# Patient Record
Sex: MALE | Race: BLACK OR AFRICAN AMERICAN | Employment: UNEMPLOYED | URBAN - METROPOLITAN AREA
[De-identification: names, ages, dates, MRNs, and addresses within clinical notes are randomized per-mention and may not be internally consistent; named-entity substitution may affect disease eponyms.]

---

## 2018-03-05 ENCOUNTER — HOSPITAL ENCOUNTER (EMERGENCY)
Age: 58
Discharge: HOME | End: 2018-03-05
Attending: EMERGENCY MEDICINE | Admitting: EMERGENCY MEDICINE
Payer: SELF-PAY

## 2018-03-05 VITALS
SYSTOLIC BLOOD PRESSURE: 162 MMHG | OXYGEN SATURATION: 97 % | WEIGHT: 200 LBS | TEMPERATURE: 97.9 F | HEART RATE: 59 BPM | BODY MASS INDEX: 29.62 KG/M2 | HEIGHT: 69 IN | RESPIRATION RATE: 20 BRPM | DIASTOLIC BLOOD PRESSURE: 74 MMHG

## 2018-03-05 DIAGNOSIS — R21 RASH: Primary | ICD-10-CM

## 2018-03-05 DIAGNOSIS — R11.0 NAUSEA WITHOUT VOMITING: ICD-10-CM

## 2018-03-05 PROCEDURE — 74011250637 HC RX REV CODE- 250/637: Performed by: EMERGENCY MEDICINE

## 2018-03-05 PROCEDURE — 99283 EMERGENCY DEPT VISIT LOW MDM: CPT

## 2018-03-05 RX ORDER — FAMOTIDINE 20 MG/1
20 TABLET, FILM COATED ORAL
Qty: 7 TAB | Refills: 0 | Status: SHIPPED | OUTPATIENT
Start: 2018-03-05 | End: 2020-02-11

## 2018-03-05 RX ORDER — FAMOTIDINE 20 MG/1
20 TABLET, FILM COATED ORAL
Status: COMPLETED | OUTPATIENT
Start: 2018-03-05 | End: 2018-03-05

## 2018-03-05 RX ADMIN — FAMOTIDINE 20 MG: 20 TABLET, FILM COATED ORAL at 16:29

## 2018-03-05 NOTE — ED PROVIDER NOTES
EMERGENCY DEPARTMENT HISTORY AND PHYSICAL EXAM      Date: 3/5/2018  Patient Name: Sean Go    History of Presenting Illness     Chief Complaint   Patient presents with    Rash     x 1 month       History Provided By: Patient    HPI: Sean Go, 56 y.o. male with PMHx significant for Vertigo and Hypotension, presents ambulatory to the ED with cc of gradual onset worsening rash to his back for the last several months. Pt describes that the rash becomes pruritic when touched but denies pain. He notes that the rash started at the back of his neck. He denies taking any medication for the rash, including Benadryl; although advised to take the benadryl by [facility]. He notes he was recently admitting at [facility] after a syncopal episode and prescribed 81 mg Aspirin and \"another medication for his lungs\" and was told he had a heart attach and kidney failure but did not have any stents placed in his heart. However he states he stopped taking both of these medications because he felt sx's of nausea at that time. He reports additional sx's of daily nausea, dizziness and palpitations intermittently that have been ongoing for the last several months. He describes that his mouth feels dry and notes concern for dehydration. He denies any sx of chills, fevers, coughing, vomiting or diarrhea. SHx: former smoker     PCP: None    There are no other complaints, changes, or physical findings at this time. Past History     Past Medical History:  Past Medical History:   Diagnosis Date    Hypotension     Vertigo        Past Surgical History:  History reviewed. No pertinent surgical history. Family History:  History reviewed. No pertinent family history.     Social History:  Social History   Substance Use Topics    Smoking status: Former Smoker    Smokeless tobacco: None    Alcohol use Yes      Comment: occ       Allergies:  No Known Allergies      Review of Systems   Review of Systems   Constitutional: Negative for activity change, appetite change, chills, fever and unexpected weight change. HENT: Negative for congestion. Eyes: Negative for pain and visual disturbance. Respiratory: Negative for cough and shortness of breath. Cardiovascular: Positive for palpitations. Negative for chest pain. Gastrointestinal: Positive for nausea. Negative for diarrhea and vomiting. Genitourinary: Negative for dysuria. Skin: Positive for rash. Neurological: Positive for dizziness. Negative for headaches. All other systems reviewed and are negative. Physical Exam   Physical Exam   Constitutional: He is oriented to person, place, and time. He appears well-developed and well-nourished. HENT:   Head: Normocephalic and atraumatic. Mouth/Throat: Oropharynx is clear and moist. Mucous membranes are dry. Eyes: Conjunctivae and EOM are normal. Pupils are equal, round, and reactive to light. Right eye exhibits no discharge. Left eye exhibits no discharge. Neck: Normal range of motion. Neck supple. No JVD present. Cardiovascular: Normal rate, regular rhythm, normal heart sounds and intact distal pulses. No murmur heard. Pulmonary/Chest: Effort normal and breath sounds normal. No respiratory distress. He has no wheezes. He has no rales. Lungs clear to auscultation    Abdominal: Soft. Bowel sounds are normal. He exhibits no distension. There is no tenderness. Musculoskeletal: Normal range of motion. He exhibits no edema. Neurological: He is alert and oriented to person, place, and time. No cranial nerve deficit. He exhibits normal muscle tone. Coordination normal.   tremulous    Skin: Skin is warm and dry. Rash noted. He is not diaphoretic. Diffuse rash to posterior trunk with small circular skin discolorations. No herald patches. Psychiatric: He has a normal mood and affect. Nursing note and vitals reviewed.         Diagnostic Study Results       Medical Decision Making   I am the first provider for this patient. I reviewed the vital signs, available nursing notes, past medical history, past surgical history, family history and social history. Vital Signs-Reviewed the patient's vital signs. Patient Vitals for the past 12 hrs:   Temp Pulse Resp BP SpO2   03/05/18 1545 97.9 °F (36.6 °C) (!) 59 20 162/74 97 %       Records Reviewed: Nursing Notes and Old Medical Records    Provider Notes (Medical Decision Making):   Homeless, clean male mentioning symptoms likely related to dehydration. Patient admits to one glass of water daily and one soup kitchen meal a day. Otherwise, drinking caffeine and beer. Non compliant with unknown meds prescribed at [facility]. Exam normal with vitals notable for hypertension but without evidence of acute dehydration. Discussed \"sour stomach\" and recommend filling a prescription for pepcid one week. Discussed proper hydration and care for the rash. Return precautions discussed. ED Course:   Initial assessment performed. The patients presenting problems have been discussed, and they are in agreement with the care plan formulated and outlined with them. I have encouraged them to ask questions as they arise throughout their visit. Disposition:  Discharge Note:  4:38 PM  The patient is ready for discharge. The patient's signs, symptoms, diagnosis, and discharge instruction have been discussed and the patient has conveyed their understanding. The patient is to follow up as recommended or return to the ER should their symptoms worsen. Plan has been discussed and the patient is in agreement. Written by Claudia Najera ED Scribhelio, as dictated by Hakan Grissom MD.    PLAN:  1. Current Discharge Medication List      START taking these medications    Details   famotidine (PEPCID) 20 mg tablet Take 1 Tab by mouth nightly. Qty: 7 Tab, Refills: 0           2.    Follow-up Information     Follow up With Details Comments Contact Info    [facility] EMERGENCY DEPT  If symptoms worsen [address] [facility] [address]  [phone]        Return to ED if worse     Diagnosis     Clinical Impression:   1. Rash    2. Nausea without vomiting        Attestations: This is note is prepared by Claudia Najera, acting as Scribe for MD Hakan Sykes MD: The scribe's documentation has been prepared under my direction and personally reviewed by me in its entirety. I confirm that the note above accurately reflects all work, treatment, procedures, and medical decision making performed by me.

## 2018-03-05 NOTE — ED NOTES
Discharge instructions were given to the patient by MADONNA Avila RN. The patient left the Emergency Department ambulatory, alert and oriented and in no acute distress with 1 prescription(s). The patient was encouraged to call or return to the ED for worsening symptoms or problems and was encouraged to schedule a follow up appointment for continuing care. Patient leaving ED accompanied by self     The patient verbalized understanding of discharge instructions and prescriptions, all questions were answered. The patient has no further concerns at this time. Patient declined wheelchair transfer upon ED discharge.

## 2018-03-05 NOTE — DISCHARGE INSTRUCTIONS
Pityriasis Rosea: Care Instructions  Your Care Instructions    Pityriasis rosea (say \"pit-uh-RY-uh-moody MAGGIE-zee-uh\") is a common skin rash. It usually starts as one scaly, reddish-pink spot on your stomach or back. Days or weeks later, more spots appear. The rash may itch, but it will not spread to other people. No one knows what causes pityriasis rosea. Some doctors believe it is a reaction to a virus. Pityriasis rosea is most common in children and young adults. It lasts 1 to 3 months and then goes away on its own. Medicine can help relieve any itching. Follow-up care is a key part of your treatment and safety. Be sure to make and go to all appointments, and call your doctor if you are having problems. It's also a good idea to know your test results and keep a list of the medicines you take. How can you care for yourself at home? · Use your medicine exactly as prescribed. Call your doctor if you have any problems with your medicine. · Expose your skin to small amounts of sunlight, but avoid sunburn. Sunlight can lessen the rash. · Use a mild soap, such as Dove or Cetaphil, when you wash your skin. · Add a handful of oatmeal (ground to a powder) to your bath. Or you can try an oatmeal bath product, such as Aveeno. Keep the water warm or lukewarm. A hot bath or shower may make the rash more visible and itchy. · Use an over-the-counter 1% hydrocortisone cream for small itchy areas. When should you call for help? Call your doctor now or seek immediate medical care if:  ? · You have signs of infection such as:  ¨ Pain, warmth, or swelling near the rash. ¨ Red streaks near the rash. ¨ Pus coming from the rash. ¨ A fever. ? Watch closely for changes in your health, and be sure to contact your doctor if:  ? · You see the rash on the palms of your hands or the soles of your feet. ? · You do not get better as expected. Where can you learn more?   Go to http://nomi-vinayak.info/. Enter S327 in the search box to learn more about \"Pityriasis Rosea: Care Instructions. \"  Current as of: October 13, 2016  Content Version: 11.4  © 5350-9003 [employer]. Care instructions adapted under license by [employer] (which disclaims liability or warranty for this information). If you have questions about a medical condition or this instruction, always ask your healthcare professional. [address] any warranty or liability for your use of this information. Indigestion (Dyspepsia or Heartburn): Care Instructions  Your Care Instructions  Sometimes it can be hard to pinpoint the cause of indigestion. (It is also called dyspepsia or heartburn.) Most cases of an upset stomach with bloating, burning, burping, and nausea are minor and go away within several hours. Home treatment and over-the-counter medicine often are able to control symptoms. But if you take medicine to relieve your indigestion without making diet and lifestyle changes, your symptoms are likely to return again and again. If you get indigestion often, it may be a sign of a more serious medical problem. Be sure to follow up with your doctor, who may want to do tests to be sure of the cause of your indigestion. Follow-up care is a key part of your treatment and safety. Be sure to make and go to all appointments, and call your doctor if you are having problems. It's also a good idea to know your test results and keep a list of the medicines you take. How can you care for yourself at home? · Your doctor may recommend over-the-counter medicine. For mild or occasional indigestion, antacids such as Gaviscon, Mylanta, Maalox, or Tums, may help. Be safe with medicines. Be careful when you take over-the-counter antacid medicines. Many of these medicines have aspirin in them.  Read the label to make sure that you are not taking more than the recommended dose. Too much aspirin can be harmful. · Your doctor also may recommend over-the-counter acid reducers, such as Pepcid AC, Tagamet HB, Zantac 75, or Prilosec. Read and follow all instructions on the label. If you use these medicines often, talk with your doctor. · Change your eating habits. ¨ It's best to eat several small meals instead of two or three large meals. ¨ After you eat, wait 2 to 3 hours before you lie down. ¨ Chocolate, mint, and alcohol can make GERD worse. ¨ Spicy foods, foods that have a lot of acid (like tomatoes and oranges), and coffee can make GERD symptoms worse in some people. If your symptoms are worse after you eat a certain food, you may want to stop eating that food to see if your symptoms get better. · Do not smoke or chew tobacco. Smoking can make GERD worse. If you need help quitting, talk to your doctor about stop-smoking programs and medicines. These can increase your chances of quitting for good. · If you have GERD symptoms at night, raise the head of your bed 6 to 8 inches. You can do this by putting the frame on blocks or placing a foam wedge under the head of your mattress. (Adding extra pillows does not work.)  · Do not wear tight clothing around your middle. · Lose weight if you need to. Losing just 5 to 10 pounds can help. · Do not take anti-inflammatory medicines, such as aspirin, ibuprofen (Advil, Motrin), or naproxen (Aleve). These can irritate the stomach. If you need a pain medicine, try acetaminophen (Tylenol), which does not cause stomach upset. When should you call for help? Call your doctor now or seek immediate medical care if:  ? · You have new or worse belly pain. ? · You are vomiting. ? Watch closely for changes in your health, and be sure to contact your doctor if:  ? · You have new or worse symptoms of indigestion. ? · You have trouble or pain swallowing. ? · You are losing weight. ? · You do not get better as expected.    Where can you learn more?  Go to http://nomi-vinayak.info/. Enter I629 in the search box to learn more about \"Indigestion (Dyspepsia or Heartburn): Care Instructions. \"  Current as of: May 12, 2017  Content Version: 11.4  © 2363-6612 [employer], [employer]. Care instructions adapted under license by [employer] (which disclaims liability or warranty for this information). If you have questions about a medical condition or this instruction, always ask your healthcare professional. [address] any warranty or liability for your use of this information.

## 2018-03-05 NOTE — ED NOTES
Emergency Department Nursing Plan of Care       The Nursing Plan of Care is developed from the Nursing assessment and Emergency Department Attending provider initial evaluation. The plan of care may be reviewed in the ED Provider note. The Plan of Care was developed with the following considerations:   Patient / Family readiness to learn indicated by:verbalized understanding  Persons(s) to be included in education: patient  Barriers to Learning/Limitations:No    Signed     Hernandez Maguire    3/5/2018   4:09 PM    See nursing assessment    Patient is alert and oriented x 4 and in no acute distress at this time. Respirations are at a regular rate, depth, and pattern. Patient updated on plan of care and has no questions or concerns at this time. Patient is a poor historian. He isn't sure what he was admitted for back in Nov 2017, he isn't clear what is pmh is, and he isn't sure if he is supposed to be taking any medications.

## 2020-02-10 ENCOUNTER — APPOINTMENT (OUTPATIENT)
Dept: GENERAL RADIOLOGY | Age: 60
DRG: 139 | End: 2020-02-10
Attending: EMERGENCY MEDICINE
Payer: MEDICAID

## 2020-02-10 ENCOUNTER — HOSPITAL ENCOUNTER (INPATIENT)
Age: 60
LOS: 5 days | Discharge: HOME | DRG: 139 | End: 2020-02-15
Attending: EMERGENCY MEDICINE | Admitting: STUDENT IN AN ORGANIZED HEALTH CARE EDUCATION/TRAINING PROGRAM
Payer: MEDICAID

## 2020-02-10 DIAGNOSIS — I42.9 CARDIOMYOPATHY, UNSPECIFIED TYPE (HCC): ICD-10-CM

## 2020-02-10 DIAGNOSIS — J18.9 PNEUMONIA OF LEFT LOWER LOBE DUE TO INFECTIOUS ORGANISM: Primary | ICD-10-CM

## 2020-02-10 DIAGNOSIS — I47.1 SVT (SUPRAVENTRICULAR TACHYCARDIA) (HCC): ICD-10-CM

## 2020-02-10 LAB
ALBUMIN SERPL-MCNC: 3.2 G/DL (ref 3.5–5)
ALBUMIN/GLOB SERPL: 0.8 {RATIO} (ref 1.1–2.2)
ALP SERPL-CCNC: 117 U/L (ref 45–117)
ALT SERPL-CCNC: 34 U/L (ref 12–78)
ANION GAP SERPL CALC-SCNC: 7 MMOL/L (ref 5–15)
AST SERPL-CCNC: 32 U/L (ref 15–37)
BASOPHILS # BLD: 0 K/UL (ref 0–0.1)
BASOPHILS NFR BLD: 0 % (ref 0–1)
BILIRUB SERPL-MCNC: 0.4 MG/DL (ref 0.2–1)
BUN SERPL-MCNC: 25 MG/DL (ref 6–20)
BUN/CREAT SERPL: 9 (ref 12–20)
CALCIUM SERPL-MCNC: 9.5 MG/DL (ref 8.5–10.1)
CHLORIDE SERPL-SCNC: 107 MMOL/L (ref 97–108)
CO2 SERPL-SCNC: 25 MMOL/L (ref 21–32)
COMMENT, HOLDF: NORMAL
CREAT SERPL-MCNC: 2.91 MG/DL (ref 0.7–1.3)
DIFFERENTIAL METHOD BLD: ABNORMAL
EOSINOPHIL # BLD: 0.1 K/UL (ref 0–0.4)
EOSINOPHIL NFR BLD: 1 % (ref 0–7)
ERYTHROCYTE [DISTWIDTH] IN BLOOD BY AUTOMATED COUNT: 14.1 % (ref 11.5–14.5)
ETHANOL SERPL-MCNC: <10 MG/DL
GLOBULIN SER CALC-MCNC: 3.8 G/DL (ref 2–4)
GLUCOSE SERPL-MCNC: 217 MG/DL (ref 65–100)
HCT VFR BLD AUTO: 47.4 % (ref 36.6–50.3)
HGB BLD-MCNC: 15.7 G/DL (ref 12.1–17)
IMM GRANULOCYTES # BLD AUTO: 0 K/UL (ref 0–0.04)
IMM GRANULOCYTES NFR BLD AUTO: 0 % (ref 0–0.5)
LYMPHOCYTES # BLD: 0.8 K/UL (ref 0.8–3.5)
LYMPHOCYTES NFR BLD: 13 % (ref 12–49)
MAGNESIUM SERPL-MCNC: 1.8 MG/DL (ref 1.6–2.4)
MCH RBC QN AUTO: 34.4 PG (ref 26–34)
MCHC RBC AUTO-ENTMCNC: 33.1 G/DL (ref 30–36.5)
MCV RBC AUTO: 103.9 FL (ref 80–99)
MONOCYTES # BLD: 0.8 K/UL (ref 0–1)
MONOCYTES NFR BLD: 12 % (ref 5–13)
NEUTS SEG # BLD: 4.7 K/UL (ref 1.8–8)
NEUTS SEG NFR BLD: 74 % (ref 32–75)
NRBC # BLD: 0 K/UL (ref 0–0.01)
NRBC BLD-RTO: 0 PER 100 WBC
PLATELET # BLD AUTO: 141 K/UL (ref 150–400)
PMV BLD AUTO: 10.7 FL (ref 8.9–12.9)
POTASSIUM SERPL-SCNC: 4.4 MMOL/L (ref 3.5–5.1)
PROT SERPL-MCNC: 7 G/DL (ref 6.4–8.2)
RBC # BLD AUTO: 4.56 M/UL (ref 4.1–5.7)
SAMPLES BEING HELD,HOLD: NORMAL
SODIUM SERPL-SCNC: 139 MMOL/L (ref 136–145)
TROPONIN I SERPL-MCNC: <0.05 NG/ML
WBC # BLD AUTO: 6.4 K/UL (ref 4.1–11.1)

## 2020-02-10 PROCEDURE — 96374 THER/PROPH/DIAG INJ IV PUSH: CPT

## 2020-02-10 PROCEDURE — 83605 ASSAY OF LACTIC ACID: CPT

## 2020-02-10 PROCEDURE — 84443 ASSAY THYROID STIM HORMONE: CPT

## 2020-02-10 PROCEDURE — 94761 N-INVAS EAR/PLS OXIMETRY MLT: CPT

## 2020-02-10 PROCEDURE — 83036 HEMOGLOBIN GLYCOSYLATED A1C: CPT

## 2020-02-10 PROCEDURE — 36415 COLL VENOUS BLD VENIPUNCTURE: CPT

## 2020-02-10 PROCEDURE — 74011250636 HC RX REV CODE- 250/636: Performed by: EMERGENCY MEDICINE

## 2020-02-10 PROCEDURE — 80307 DRUG TEST PRSMV CHEM ANLYZR: CPT

## 2020-02-10 PROCEDURE — 87040 BLOOD CULTURE FOR BACTERIA: CPT

## 2020-02-10 PROCEDURE — 71045 X-RAY EXAM CHEST 1 VIEW: CPT

## 2020-02-10 PROCEDURE — 84484 ASSAY OF TROPONIN QUANT: CPT

## 2020-02-10 PROCEDURE — 99285 EMERGENCY DEPT VISIT HI MDM: CPT

## 2020-02-10 PROCEDURE — 65660000000 HC RM CCU STEPDOWN

## 2020-02-10 PROCEDURE — 85025 COMPLETE CBC W/AUTO DIFF WBC: CPT

## 2020-02-10 PROCEDURE — 83735 ASSAY OF MAGNESIUM: CPT

## 2020-02-10 PROCEDURE — 80053 COMPREHEN METABOLIC PANEL: CPT

## 2020-02-10 PROCEDURE — 93005 ELECTROCARDIOGRAM TRACING: CPT

## 2020-02-10 RX ORDER — AZITHROMYCIN 250 MG/1
500 TABLET, FILM COATED ORAL
Status: COMPLETED | OUTPATIENT
Start: 2020-02-10 | End: 2020-02-11

## 2020-02-10 RX ORDER — SODIUM CHLORIDE 9 MG/ML
1000 INJECTION, SOLUTION INTRAVENOUS ONCE
Status: COMPLETED | OUTPATIENT
Start: 2020-02-11 | End: 2020-02-11

## 2020-02-10 RX ORDER — ADENOSINE 3 MG/ML
6 INJECTION, SOLUTION INTRAVENOUS
Status: COMPLETED | OUTPATIENT
Start: 2020-02-10 | End: 2020-02-10

## 2020-02-10 RX ORDER — METOPROLOL TARTRATE 5 MG/5ML
5 INJECTION INTRAVENOUS ONCE
Status: COMPLETED | OUTPATIENT
Start: 2020-02-10 | End: 2020-02-11

## 2020-02-10 RX ORDER — SODIUM CHLORIDE 9 MG/ML
100 INJECTION, SOLUTION INTRAVENOUS CONTINUOUS
Status: DISCONTINUED | OUTPATIENT
Start: 2020-02-10 | End: 2020-02-11

## 2020-02-10 RX ORDER — GABAPENTIN 250 MG/5ML
125 SOLUTION ORAL
Status: COMPLETED | OUTPATIENT
Start: 2020-02-10 | End: 2020-02-11

## 2020-02-10 RX ADMIN — ADENOSINE 12 MG: 3 INJECTION, SOLUTION INTRAVENOUS at 22:43

## 2020-02-10 RX ADMIN — SODIUM CHLORIDE 1000 ML: 900 INJECTION, SOLUTION INTRAVENOUS at 22:47

## 2020-02-10 RX ADMIN — ADENOSINE 6 MG: 3 INJECTION, SOLUTION INTRAVENOUS at 22:40

## 2020-02-11 ENCOUNTER — APPOINTMENT (OUTPATIENT)
Dept: GENERAL RADIOLOGY | Age: 60
DRG: 139 | End: 2020-02-11
Attending: STUDENT IN AN ORGANIZED HEALTH CARE EDUCATION/TRAINING PROGRAM
Payer: MEDICAID

## 2020-02-11 ENCOUNTER — APPOINTMENT (OUTPATIENT)
Dept: NON INVASIVE DIAGNOSTICS | Age: 60
DRG: 139 | End: 2020-02-11
Attending: STUDENT IN AN ORGANIZED HEALTH CARE EDUCATION/TRAINING PROGRAM
Payer: MEDICAID

## 2020-02-11 LAB
AMPHET UR QL SCN: NEGATIVE
ANION GAP SERPL CALC-SCNC: 7 MMOL/L (ref 5–15)
ARTERIAL PATENCY WRIST A: ABNORMAL
ATRIAL RATE: 120 BPM
ATRIAL RATE: 178 BPM
AV VELOCITY RATIO: 0.63
BARBITURATES UR QL SCN: NEGATIVE
BASE DEFICIT BLD-SCNC: 3 MMOL/L
BDY SITE: ABNORMAL
BENZODIAZ UR QL: NEGATIVE
BNP SERPL-MCNC: 2809 PG/ML
BUN SERPL-MCNC: 22 MG/DL (ref 6–20)
BUN/CREAT SERPL: 9 (ref 12–20)
CA-I BLD-SCNC: 1.27 MMOL/L (ref 1.12–1.32)
CALCIUM SERPL-MCNC: 8.4 MG/DL (ref 8.5–10.1)
CALCULATED P AXIS, ECG09: 76 DEGREES
CALCULATED R AXIS, ECG10: -6 DEGREES
CALCULATED T AXIS, ECG11: 55 DEGREES
CALCULATED T AXIS, ECG11: 90 DEGREES
CANNABINOIDS UR QL SCN: NEGATIVE
CHLORIDE SERPL-SCNC: 113 MMOL/L (ref 97–108)
CO2 SERPL-SCNC: 20 MMOL/L (ref 21–32)
COCAINE UR QL SCN: NEGATIVE
CREAT SERPL-MCNC: 2.41 MG/DL (ref 0.7–1.3)
DIAGNOSIS, 93000: NORMAL
DIAGNOSIS, 93000: NORMAL
DRUG SCRN COMMENT,DRGCM: NORMAL
ECHO AO ROOT DIAM: 3.16 CM
ECHO AV AREA PEAK VELOCITY: 2.5 CM2
ECHO AV PEAK GRADIENT: 8.6 MMHG
ECHO AV PEAK VELOCITY: 147.02 CM/S
ECHO EST RA PRESSURE: 10 MMHG
ECHO LA AREA 4C: 18.4 CM2
ECHO LA MAJOR AXIS: 3.95 CM
ECHO LA TO AORTIC ROOT RATIO: 1.25
ECHO LA VOL 2C: 63.17 ML (ref 18–58)
ECHO LA VOL 4C: 47.32 ML (ref 18–58)
ECHO LA VOL BP: 60.14 ML (ref 18–58)
ECHO LA VOL/BSA BIPLANE: 30.98 ML/M2 (ref 16–28)
ECHO LA VOLUME INDEX A2C: 32.54 ML/M2 (ref 16–28)
ECHO LA VOLUME INDEX A4C: 24.37 ML/M2 (ref 16–28)
ECHO LV E' LATERAL VELOCITY: 7.3 CM/S
ECHO LV E' SEPTAL VELOCITY: 4.79 CM/S
ECHO LV INTERNAL DIMENSION DIASTOLIC: 5.54 CM (ref 4.2–5.9)
ECHO LV INTERNAL DIMENSION SYSTOLIC: 5.26 CM
ECHO LV IVSD: 0.88 CM (ref 0.6–1)
ECHO LV MASS 2D: 204.7 G (ref 88–224)
ECHO LV MASS INDEX 2D: 105.4 G/M2 (ref 49–115)
ECHO LV POSTERIOR WALL DIASTOLIC: 0.82 CM (ref 0.6–1)
ECHO LVOT DIAM: 2.27 CM
ECHO LVOT PEAK GRADIENT: 3.4 MMHG
ECHO LVOT PEAK VELOCITY: 92.47 CM/S
ECHO MV A VELOCITY: 42.99 CM/S
ECHO MV AREA PHT: 4.1 CM2
ECHO MV E DECELERATION TIME (DT): 187.1 MS
ECHO MV E VELOCITY: 127.52 CM/S
ECHO MV E/A RATIO: 2.97
ECHO MV E/E' LATERAL: 17.47
ECHO MV E/E' RATIO (AVERAGED): 22.05
ECHO MV E/E' SEPTAL: 26.62
ECHO MV PRESSURE HALF TIME (PHT): 54.3 MS
ECHO MV REGURGITANT RADIUS PISA: 0.47 CM
ECHO PULMONARY ARTERY SYSTOLIC PRESSURE (PASP): 34 MMHG
ECHO PV MAX VELOCITY: 76.74 CM/S
ECHO PV PEAK GRADIENT: 2.4 MMHG
ECHO RIGHT VENTRICULAR SYSTOLIC PRESSURE (RVSP): 34 MMHG
ECHO RV INTERNAL DIMENSION: 4.16 CM
ECHO RV TAPSE: 2.42 CM (ref 1.5–2)
ECHO TV REGURGITANT MAX VELOCITY: 245.2 CM/S
ECHO TV REGURGITANT PEAK GRADIENT: 24 MMHG
ERYTHROCYTE [DISTWIDTH] IN BLOOD BY AUTOMATED COUNT: 14 % (ref 11.5–14.5)
EST. AVERAGE GLUCOSE BLD GHB EST-MCNC: 126 MG/DL
GAS FLOW.O2 O2 DELIVERY SYS: ABNORMAL L/MIN
GLUCOSE BLD STRIP.AUTO-MCNC: 101 MG/DL (ref 65–100)
GLUCOSE BLD STRIP.AUTO-MCNC: 109 MG/DL (ref 65–100)
GLUCOSE BLD STRIP.AUTO-MCNC: 119 MG/DL (ref 65–100)
GLUCOSE BLD STRIP.AUTO-MCNC: 188 MG/DL (ref 65–100)
GLUCOSE SERPL-MCNC: 115 MG/DL (ref 65–100)
HBA1C MFR BLD: 6 % (ref 4–5.6)
HCO3 BLD-SCNC: 21.6 MMOL/L (ref 22–26)
HCT VFR BLD AUTO: 41.2 % (ref 36.6–50.3)
HGB BLD-MCNC: 13.5 G/DL (ref 12.1–17)
LACTATE SERPL-SCNC: 1.6 MMOL/L (ref 0.4–2)
LVFS 2D: 5.18 %
MAGNESIUM SERPL-MCNC: 1.7 MG/DL (ref 1.6–2.4)
MCH RBC QN AUTO: 33.9 PG (ref 26–34)
MCHC RBC AUTO-ENTMCNC: 32.8 G/DL (ref 30–36.5)
MCV RBC AUTO: 103.5 FL (ref 80–99)
METHADONE UR QL: NEGATIVE
MV DEC SLOPE: 6.81
NRBC # BLD: 0 K/UL (ref 0–0.01)
NRBC BLD-RTO: 0 PER 100 WBC
OPIATES UR QL: NEGATIVE
P-R INTERVAL, ECG05: 124 MS
PCO2 BLD: 36.5 MMHG (ref 35–45)
PCP UR QL: NEGATIVE
PH BLD: 7.38 [PH] (ref 7.35–7.45)
PLATELET # BLD AUTO: 149 K/UL (ref 150–400)
PMV BLD AUTO: 11 FL (ref 8.9–12.9)
PO2 BLD: 53 MMHG (ref 80–100)
POTASSIUM SERPL-SCNC: 4.4 MMOL/L (ref 3.5–5.1)
Q-T INTERVAL, ECG07: 260 MS
Q-T INTERVAL, ECG07: 344 MS
QRS DURATION, ECG06: 138 MS
QRS DURATION, ECG06: 84 MS
QTC CALCULATION (BEZET), ECG08: 451 MS
QTC CALCULATION (BEZET), ECG08: 486 MS
RBC # BLD AUTO: 3.98 M/UL (ref 4.1–5.7)
SAO2 % BLD: 86 % (ref 92–97)
SERVICE CMNT-IMP: ABNORMAL
SODIUM SERPL-SCNC: 140 MMOL/L (ref 136–145)
SPECIMEN TYPE: ABNORMAL
TROPONIN I SERPL-MCNC: 0.06 NG/ML
TSH SERPL DL<=0.05 MIU/L-ACNC: 2.04 UIU/ML (ref 0.36–3.74)
VENTRICULAR RATE, ECG03: 120 BPM
VENTRICULAR RATE, ECG03: 181 BPM
WBC # BLD AUTO: 6.9 K/UL (ref 4.1–11.1)

## 2020-02-11 PROCEDURE — 94760 N-INVAS EAR/PLS OXIMETRY 1: CPT

## 2020-02-11 PROCEDURE — 94640 AIRWAY INHALATION TREATMENT: CPT

## 2020-02-11 PROCEDURE — 36415 COLL VENOUS BLD VENIPUNCTURE: CPT

## 2020-02-11 PROCEDURE — 90686 IIV4 VACC NO PRSV 0.5 ML IM: CPT | Performed by: STUDENT IN AN ORGANIZED HEALTH CARE EDUCATION/TRAINING PROGRAM

## 2020-02-11 PROCEDURE — 83735 ASSAY OF MAGNESIUM: CPT

## 2020-02-11 PROCEDURE — 74011250637 HC RX REV CODE- 250/637: Performed by: EMERGENCY MEDICINE

## 2020-02-11 PROCEDURE — 82803 BLOOD GASES ANY COMBINATION: CPT

## 2020-02-11 PROCEDURE — 80307 DRUG TEST PRSMV CHEM ANLYZR: CPT

## 2020-02-11 PROCEDURE — 71045 X-RAY EXAM CHEST 1 VIEW: CPT

## 2020-02-11 PROCEDURE — 85027 COMPLETE CBC AUTOMATED: CPT

## 2020-02-11 PROCEDURE — 77010033678 HC OXYGEN DAILY

## 2020-02-11 PROCEDURE — 65660000000 HC RM CCU STEPDOWN

## 2020-02-11 PROCEDURE — 74011000250 HC RX REV CODE- 250: Performed by: STUDENT IN AN ORGANIZED HEALTH CARE EDUCATION/TRAINING PROGRAM

## 2020-02-11 PROCEDURE — 74011250637 HC RX REV CODE- 250/637: Performed by: STUDENT IN AN ORGANIZED HEALTH CARE EDUCATION/TRAINING PROGRAM

## 2020-02-11 PROCEDURE — 74011000250 HC RX REV CODE- 250: Performed by: HOSPITALIST

## 2020-02-11 PROCEDURE — 82962 GLUCOSE BLOOD TEST: CPT

## 2020-02-11 PROCEDURE — 83880 ASSAY OF NATRIURETIC PEPTIDE: CPT

## 2020-02-11 PROCEDURE — 93306 TTE W/DOPPLER COMPLETE: CPT

## 2020-02-11 PROCEDURE — 90471 IMMUNIZATION ADMIN: CPT

## 2020-02-11 PROCEDURE — 74011636637 HC RX REV CODE- 636/637: Performed by: HOSPITALIST

## 2020-02-11 PROCEDURE — 80048 BASIC METABOLIC PNL TOTAL CA: CPT

## 2020-02-11 PROCEDURE — 74011250636 HC RX REV CODE- 250/636: Performed by: STUDENT IN AN ORGANIZED HEALTH CARE EDUCATION/TRAINING PROGRAM

## 2020-02-11 PROCEDURE — 74011250637 HC RX REV CODE- 250/637: Performed by: HOSPITALIST

## 2020-02-11 PROCEDURE — 74011250636 HC RX REV CODE- 250/636: Performed by: EMERGENCY MEDICINE

## 2020-02-11 PROCEDURE — 94664 DEMO&/EVAL PT USE INHALER: CPT

## 2020-02-11 PROCEDURE — 74011250637 HC RX REV CODE- 250/637: Performed by: NURSE PRACTITIONER

## 2020-02-11 PROCEDURE — 36600 WITHDRAWAL OF ARTERIAL BLOOD: CPT

## 2020-02-11 PROCEDURE — 74011000258 HC RX REV CODE- 258: Performed by: EMERGENCY MEDICINE

## 2020-02-11 PROCEDURE — 84484 ASSAY OF TROPONIN QUANT: CPT

## 2020-02-11 RX ORDER — SODIUM CHLORIDE 0.9 % (FLUSH) 0.9 %
5-40 SYRINGE (ML) INJECTION EVERY 8 HOURS
Status: DISCONTINUED | OUTPATIENT
Start: 2020-02-11 | End: 2020-02-15 | Stop reason: HOSPADM

## 2020-02-11 RX ORDER — FUROSEMIDE 10 MG/ML
40 INJECTION INTRAMUSCULAR; INTRAVENOUS ONCE
Status: COMPLETED | OUTPATIENT
Start: 2020-02-11 | End: 2020-02-11

## 2020-02-11 RX ORDER — ENOXAPARIN SODIUM 100 MG/ML
40 INJECTION SUBCUTANEOUS DAILY
Status: DISCONTINUED | OUTPATIENT
Start: 2020-02-12 | End: 2020-02-15 | Stop reason: HOSPADM

## 2020-02-11 RX ORDER — MORPHINE SULFATE 2 MG/ML
1 INJECTION, SOLUTION INTRAMUSCULAR; INTRAVENOUS
Status: DISCONTINUED | OUTPATIENT
Start: 2020-02-11 | End: 2020-02-15 | Stop reason: HOSPADM

## 2020-02-11 RX ORDER — ENOXAPARIN SODIUM 100 MG/ML
30 INJECTION SUBCUTANEOUS EVERY 24 HOURS
Status: DISCONTINUED | OUTPATIENT
Start: 2020-02-11 | End: 2020-02-11

## 2020-02-11 RX ORDER — IPRATROPIUM BROMIDE AND ALBUTEROL SULFATE 2.5; .5 MG/3ML; MG/3ML
3 SOLUTION RESPIRATORY (INHALATION)
Status: DISCONTINUED | OUTPATIENT
Start: 2020-02-11 | End: 2020-02-15 | Stop reason: HOSPADM

## 2020-02-11 RX ORDER — LORAZEPAM 2 MG/1
2 TABLET ORAL
Status: DISCONTINUED | OUTPATIENT
Start: 2020-02-11 | End: 2020-02-15 | Stop reason: HOSPADM

## 2020-02-11 RX ORDER — MAGNESIUM SULFATE 100 %
4 CRYSTALS MISCELLANEOUS AS NEEDED
Status: DISCONTINUED | OUTPATIENT
Start: 2020-02-11 | End: 2020-02-15 | Stop reason: HOSPADM

## 2020-02-11 RX ORDER — IPRATROPIUM BROMIDE AND ALBUTEROL SULFATE 2.5; .5 MG/3ML; MG/3ML
3 SOLUTION RESPIRATORY (INHALATION)
Status: DISCONTINUED | OUTPATIENT
Start: 2020-02-11 | End: 2020-02-14

## 2020-02-11 RX ORDER — METOPROLOL TARTRATE 5 MG/5ML
5 INJECTION INTRAVENOUS ONCE
Status: COMPLETED | OUTPATIENT
Start: 2020-02-11 | End: 2020-02-11

## 2020-02-11 RX ORDER — FUROSEMIDE 10 MG/ML
20 INJECTION INTRAMUSCULAR; INTRAVENOUS ONCE
Status: DISCONTINUED | OUTPATIENT
Start: 2020-02-11 | End: 2020-02-11

## 2020-02-11 RX ORDER — DEXTROSE MONOHYDRATE 100 MG/ML
0-250 INJECTION, SOLUTION INTRAVENOUS AS NEEDED
Status: DISCONTINUED | OUTPATIENT
Start: 2020-02-11 | End: 2020-02-15 | Stop reason: HOSPADM

## 2020-02-11 RX ORDER — BENZONATATE 100 MG/1
100 CAPSULE ORAL
Status: DISCONTINUED | OUTPATIENT
Start: 2020-02-11 | End: 2020-02-15 | Stop reason: HOSPADM

## 2020-02-11 RX ORDER — FOLIC ACID 1 MG/1
1 TABLET ORAL DAILY
Status: DISCONTINUED | OUTPATIENT
Start: 2020-02-12 | End: 2020-02-15 | Stop reason: HOSPADM

## 2020-02-11 RX ORDER — SODIUM CHLORIDE 0.9 % (FLUSH) 0.9 %
5-40 SYRINGE (ML) INJECTION AS NEEDED
Status: DISCONTINUED | OUTPATIENT
Start: 2020-02-11 | End: 2020-02-15 | Stop reason: HOSPADM

## 2020-02-11 RX ORDER — CARVEDILOL 12.5 MG/1
12.5 TABLET ORAL 2 TIMES DAILY WITH MEALS
Status: DISCONTINUED | OUTPATIENT
Start: 2020-02-11 | End: 2020-02-14

## 2020-02-11 RX ORDER — ENOXAPARIN SODIUM 100 MG/ML
40 INJECTION SUBCUTANEOUS EVERY 24 HOURS
Status: DISCONTINUED | OUTPATIENT
Start: 2020-02-11 | End: 2020-02-11 | Stop reason: DRUGHIGH

## 2020-02-11 RX ORDER — ACETAMINOPHEN 325 MG/1
650 TABLET ORAL
Status: DISCONTINUED | OUTPATIENT
Start: 2020-02-11 | End: 2020-02-15 | Stop reason: HOSPADM

## 2020-02-11 RX ORDER — BUDESONIDE 0.5 MG/2ML
500 INHALANT ORAL
Status: DISCONTINUED | OUTPATIENT
Start: 2020-02-11 | End: 2020-02-15 | Stop reason: HOSPADM

## 2020-02-11 RX ORDER — GABAPENTIN 100 MG/1
100 CAPSULE ORAL 3 TIMES DAILY
Status: DISCONTINUED | OUTPATIENT
Start: 2020-02-11 | End: 2020-02-15 | Stop reason: HOSPADM

## 2020-02-11 RX ORDER — LORAZEPAM 2 MG/1
4 TABLET ORAL
Status: DISCONTINUED | OUTPATIENT
Start: 2020-02-11 | End: 2020-02-15 | Stop reason: HOSPADM

## 2020-02-11 RX ORDER — LORAZEPAM 2 MG/ML
1 INJECTION INTRAMUSCULAR ONCE
Status: COMPLETED | OUTPATIENT
Start: 2020-02-11 | End: 2020-02-11

## 2020-02-11 RX ORDER — PREDNISONE 20 MG/1
40 TABLET ORAL
Status: COMPLETED | OUTPATIENT
Start: 2020-02-11 | End: 2020-02-15

## 2020-02-11 RX ORDER — GABAPENTIN 250 MG/5ML
125 SOLUTION ORAL EVERY 8 HOURS
Status: DISCONTINUED | OUTPATIENT
Start: 2020-02-11 | End: 2020-02-11

## 2020-02-11 RX ORDER — ONDANSETRON 2 MG/ML
4 INJECTION INTRAMUSCULAR; INTRAVENOUS
Status: DISCONTINUED | OUTPATIENT
Start: 2020-02-11 | End: 2020-02-15 | Stop reason: HOSPADM

## 2020-02-11 RX ORDER — FAMOTIDINE 20 MG/1
20 TABLET, FILM COATED ORAL
Status: DISCONTINUED | OUTPATIENT
Start: 2020-02-11 | End: 2020-02-15 | Stop reason: HOSPADM

## 2020-02-11 RX ORDER — LANOLIN ALCOHOL/MO/W.PET/CERES
100 CREAM (GRAM) TOPICAL DAILY
Status: DISCONTINUED | OUTPATIENT
Start: 2020-02-12 | End: 2020-02-15 | Stop reason: HOSPADM

## 2020-02-11 RX ADMIN — SODIUM CHLORIDE 100 ML/HR: 900 INJECTION, SOLUTION INTRAVENOUS at 01:14

## 2020-02-11 RX ADMIN — FAMOTIDINE 20 MG: 20 TABLET ORAL at 01:16

## 2020-02-11 RX ADMIN — METOPROLOL TARTRATE 5 MG: 5 INJECTION INTRAVENOUS at 00:10

## 2020-02-11 RX ADMIN — ENOXAPARIN SODIUM 30 MG: 30 INJECTION SUBCUTANEOUS at 03:09

## 2020-02-11 RX ADMIN — BENZONATATE 100 MG: 100 CAPSULE ORAL at 21:52

## 2020-02-11 RX ADMIN — AZITHROMYCIN 500 MG: 250 TABLET, FILM COATED ORAL at 00:06

## 2020-02-11 RX ADMIN — GABAPENTIN 100 MG: 100 CAPSULE ORAL at 21:52

## 2020-02-11 RX ADMIN — IPRATROPIUM BROMIDE AND ALBUTEROL SULFATE 3 ML: .5; 3 SOLUTION RESPIRATORY (INHALATION) at 17:09

## 2020-02-11 RX ADMIN — Medication 10 ML: at 14:29

## 2020-02-11 RX ADMIN — Medication 10 ML: at 08:13

## 2020-02-11 RX ADMIN — GABAPENTIN 125 MG: 250 SOLUTION ORAL at 01:15

## 2020-02-11 RX ADMIN — CARVEDILOL 12.5 MG: 12.5 TABLET, FILM COATED ORAL at 18:40

## 2020-02-11 RX ADMIN — IPRATROPIUM BROMIDE AND ALBUTEROL SULFATE 3 ML: .5; 3 SOLUTION RESPIRATORY (INHALATION) at 20:15

## 2020-02-11 RX ADMIN — METOPROLOL TARTRATE 5 MG: 5 INJECTION INTRAVENOUS at 07:13

## 2020-02-11 RX ADMIN — LORAZEPAM 1 MG: 2 INJECTION INTRAMUSCULAR; INTRAVENOUS at 05:01

## 2020-02-11 RX ADMIN — FAMOTIDINE 20 MG: 20 TABLET ORAL at 21:52

## 2020-02-11 RX ADMIN — CEFTRIAXONE 1 G: 1 INJECTION, POWDER, FOR SOLUTION INTRAMUSCULAR; INTRAVENOUS at 00:07

## 2020-02-11 RX ADMIN — Medication 10 ML: at 00:32

## 2020-02-11 RX ADMIN — GABAPENTIN 100 MG: 100 CAPSULE ORAL at 08:13

## 2020-02-11 RX ADMIN — INFLUENZA VIRUS VACCINE 0.5 ML: 15; 15; 15; 15 SUSPENSION INTRAMUSCULAR at 14:29

## 2020-02-11 RX ADMIN — FUROSEMIDE 40 MG: 10 INJECTION, SOLUTION INTRAMUSCULAR; INTRAVENOUS at 07:24

## 2020-02-11 RX ADMIN — GABAPENTIN 100 MG: 100 CAPSULE ORAL at 15:23

## 2020-02-11 RX ADMIN — SODIUM CHLORIDE 1000 ML/HR: 900 INJECTION, SOLUTION INTRAVENOUS at 00:06

## 2020-02-11 RX ADMIN — PREDNISONE 40 MG: 20 TABLET ORAL at 18:40

## 2020-02-11 RX ADMIN — Medication 10 ML: at 21:52

## 2020-02-11 RX ADMIN — BUDESONIDE INHALATION 500 MCG: 0.5 SUSPENSION RESPIRATORY (INHALATION) at 20:15

## 2020-02-11 NOTE — PROGRESS NOTES
[facility]  Hospitalist Group                                                                                          Hospitalist Progress Note  Dorys Mckenzie MD  Answering service: [phone] from in house phone        Date of Service:  2020  NAME:  Darlin Conte  :  1960  MRN:  [id number]      Admission Summary: This 17-year-old gentleman came to the emergency room via EMS complaining of left hip pain and burning for a few weeks. When EMS arrived, patient's heart rate was sustained in the 180s. Interval history / Subjective:    Patient coughing and wheezing. The cough has been going on for 2-3 weeks. He has smoked for several years until he quitted 5 years ago. Never been diagnosed with copd/emphysema     Assessment & Plan:   Right perihilar pneumonia, CAP  Acute wheezy bronchitis?undiagnosed copd  Ceftriaxone and azithromycin  Schedule bronchodilators,ICS and prednisone. ABG  Blood culture pending    Severe cardiomyopathy,no clinical CHF  -Echocardiogram reported EF of 25-30%  -Needs cardiac evaluation,follow up,thus will go ahead and ask cardiology to see. -Start coreg. NO ace-I/arb due to ckd     Supraventricular tachycardia  TSH wnl   UDS :Negative   Received adenocard. HR low 100. Start coreg      GUANAKO  Vs GUANAKO on CKD  Creatinine 2.91 (no baseline value to compare)  Avoid nephrotoxic drugs, renally dose meds       Prediabetes. A1C 6.counseled on healthy eating,carb limitation   Left LE shooting pain,sciatica  -Exam is benign without swelling, deformity.   Range of motion is intact  -Try gabapentin        Homelessness  Case management consult     Patient's Baseline: Ambulates with walking  DVT ppx: Lovenox  Code status: Full  Disposition: TBD  Care plan discussed with patient/family and nurse.  [facility] Problems  Never Reviewed          Codes Class Noted POA    SVT ([facility]ricular tachycardia) ([facility] ICD-10-CM: I47.1  ICD-9-CM: 427.89  2/10/2020 Unknown Pneumonia ICD-10-CM: J18.9  ICD-9-CM: 578  2/10/2020 Unknown                Review of Systems:   A comprehensive review of systems was negative except for that written in the HPI. Vital Signs:    Last 24hrs VS reviewed since prior progress note. Most recent are:  Visit Vitals  BP (!) 148/97 (BP 1 Location: Right arm, BP Patient Position: At rest)   Pulse (!) 118   Temp 98 °F (36.7 °C)   Resp 20   Ht 5' 9\" (1.753 m)   Wt 78.4 kg (172 lb 13.5 oz)   SpO2 95%   BMI 25.52 kg/m²         Intake/Output Summary (Last 24 hours) at 2/11/2020 1733  Last data filed at 2/11/2020 1515  Gross per 24 hour   Intake 976.67 ml   Output 1600 ml   Net -623.33 ml        Physical Examination:             Constitutional:  No acute distress, cooperative, pleasant    ENT:  Oral mucosa moist, oropharynx benign. Resp:  Diffuse wheezing. CV:  Regular rhythm, normal rate, no murmurs, gallops, rubs    GI:  Soft, non distended, non tender. normoactive bowel sounds, no hepatosplenomegaly     Musculoskeletal:  No swelling, deformity or edema on the left lower extremity. Range of motion is intact. Neurologic:  Moves all extremities. AAOx3, CN II-XII reviewed     Psych:  Good insight, Not anxious nor agitated. Data Review:    Review and/or order of clinical lab test  Review and/or order of tests in the radiology section of CPT  Review and/or order of tests in the medicine section of CPT      Labs:     Recent Labs     02/11/20  0307 02/10/20  2212   WBC 6.9 6.4   HGB 13.5 15.7   HCT 41.2 47.4   * 141*     Recent Labs     02/11/20  0307 02/10/20  2212    139   K 4.4 4.4   * 107   CO2 20* 25   BUN 22* 25*   CREA 2.41* 2.91*   * 217*   CA 8.4* 9.5   MG 1.7 1.8     Recent Labs     02/10/20  2212   SGOT 32   ALT 34      TBILI 0.4   TP 7.0   ALB 3.2*   GLOB 3.8     No results for input(s): INR, PTP, APTT, INREXT in the last 72 hours. No results for input(s): FE, TIBC, PSAT, FERR in the last 72 hours. No results found for: FOL, RBCF   No results for input(s): PH, PCO2, PO2 in the last 72 hours.   Recent Labs     02/11/20  0307 02/10/20  2212   TROIQ 0.06* <0.05     No results found for: CHOL, CHOLX, CHLST, CHOLV, HDL, HDLP, LDL, LDLC, DLDLP, TGLX, TRIGL, TRIGP, CHHD, CHHDX  Lab Results   Component Value Date/Time    Glucose (POC) 119 (H) 02/11/2020 04:01 PM    Glucose (POC) 109 (H) 02/11/2020 11:07 AM    Glucose (POC) 101 (H) 02/11/2020 08:09 AM     No results found for: COLOR, APPRN, SPGRU, REFSG, ARIANA, PROTU, GLUCU, KETU, BILU, UROU, LUCAS, LEUKU, GLUKE, EPSU, BACTU, WBCU, RBCU, CASTS, UCRY      Medications Reviewed:     Current Facility-Administered Medications   Medication Dose Route Frequency    famotidine (PEPCID) tablet 20 mg  20 mg Oral QHS    sodium chloride (NS) flush 5-40 mL  5-40 mL IntraVENous Q8H    sodium chloride (NS) flush 5-40 mL  5-40 mL IntraVENous PRN    acetaminophen (TYLENOL) tablet 650 mg  650 mg Oral Q4H PRN    morphine injection 1 mg  1 mg IntraVENous Q4H PRN    ondansetron (ZOFRAN) injection 4 mg  4 mg IntraVENous Q4H PRN    insulin regular (NOVOLIN R, HUMULIN R) injection   SubCUTAneous AC&HS    glucose chewable tablet 16 g  4 Tab Oral PRN    glucagon (GLUCAGEN) injection 1 mg  1 mg IntraMUSCular PRN    dextrose 10% infusion 0-250 mL  0-250 mL IntraVENous PRN    albuterol-ipratropium (DUO-NEB) 2.5 MG-0.5 MG/3 ML  3 mL Nebulization Q4H PRN    gabapentin (NEURONTIN) capsule 100 mg  100 mg Oral TID    [START ON 2/12/2020] enoxaparin (LOVENOX) injection 40 mg  40 mg SubCUTAneous DAILY    [START ON 2/12/2020] cefTRIAXone (ROCEPHIN) 1 g in 0.9% sodium chloride (MBP/ADV) 50 mL  1 g IntraVENous Q24H    [START ON 2/12/2020] azithromycin (ZITHROMAX) 500 mg in 0.9% sodium chloride (MBP/ADV) 250 mL  500 mg IntraVENous Q24H     ______________________________________________________________________  EXPECTED LENGTH OF STAY: - - -  ACTUAL LENGTH OF STAY:          1 Latoya Gonzalez MD
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
## 2020-02-11 NOTE — ED NOTES
6mg of Adenosine given per MD ordered. Patient tolerated well. HR remains in the 180.   12mg of Adenosine given per MD order. HR decreased to the 130's. Patient tolerated well. Repeat EKG obtained and given to Dr. Yanci Montoya.
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
## 2020-02-11 NOTE — PROGRESS NOTES
Patient noted to have trouble of breathing , sat in upper 80's   Rales on PE   Possible volume over load   X-ray   Lasix 20 mg now

## 2020-02-11 NOTE — ED NOTES
Verbal shift change report given to Alanna Gamez  (oncoming nurse) by Cristiane Taylor  (offgoing nurse). Report included the following information SBAR, ED Summary, Intake/Output, MAR and Recent Results.
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
## 2020-02-11 NOTE — PROGRESS NOTES
TRANSFER - IN REPORT:    Verbal report received from Kimberly(aviva) on Dina Kelly  being received from ED(unit) for routine progression of care      Report consisted of patients Situation, Background, Assessment and   Recommendations(SBAR). Information from the following report(s) SBAR, MAR and Recent Results was reviewed with the receiving nurse. Opportunity for questions and clarification was provided. Assessment completed upon patients arrival to unit and care assumed.

## 2020-02-11 NOTE — PROGRESS NOTES
Reason for Admission: Pneumonia; SVT                     RUR Score: TBD/ RRAT: 4- LOW                   Plan for utilizing home health: TBD                         Current Advanced Directive/Advance Care Plan: Pt does not have an AMD. Pt would not like further information at this time. Transition of Care Plan: CM met with pt at bedside to discuss CM role and to assess pt needs. CM verified demographics including insurance and emergency contact information. Pt is currently homeless; pt reports he does not have any family or friends in the area. CM provided pt with housing/community resources. CM discussed contacting community Access and Support Services for referral; pt agreeable and plan to contact again closer to discharge. Transportation: TBD. Pt reports no DME use and IADLs prior to admission. Pt does not have an established pharmacy and reports he does not take any medications. Pt verified he does not have a PCP but would like assistance with getting a PCP. Pt reports no concerns for discharge at this time. CM to follow and assist with disposition needs as they arise. Care Management Interventions  PCP Verified by CM: No  Palliative Care Criteria Met (RRAT>21 & CHF Dx)?: No  Mode of Transport at Discharge: Other (see comment)(TBD)  Transition of Care Consult (CM Consult):  Other(Initial Assessment/ Consult)  MyChart Signup: No  Discharge Durable Medical Equipment: No  Physical Therapy Consult: No  Occupational Therapy Consult: No  Speech Therapy Consult: No  Current Support Network: Shelter, Other  Confirm Follow Up Transport: Other (see comment)  The Patient and/or Patient Representative was Provided with a Choice of Provider and Agrees with the Discharge Plan?: No  Freedom of Choice List was Provided with Basic Dialogue that Supports the Patient's Individualized Plan of Care/Goals, Treatment Preferences and Shares the Quality Data Associated with the Providers?: No  [employer] Information Provided?: No  Discharge Location  Discharge Placement: Homeless(Disposition TBD/subject to change pending care recommendations)    Imtiaz Pelaez RN, BSN  Care Management Department

## 2020-02-11 NOTE — ED NOTES
0700: When rounding on patient RN noted new onset of rales in bilateral lungs. Patient coughing, O2 sats in the mid 80's on room air when it was previosuly mid 90's. RN sat patient up in bed, Placed patient on 2L NC with minimal improvement, O2 titrated up to 3L. O2 sats increased to 96%. Dr. Jessa Hart notified, he will come see patient.    8121: Dr. Jessa Hart at bedside. Repeat chest XR ordered. Will give metoprolol and lasix as ordered. Continuous fluids paused per verbal order.

## 2020-02-11 NOTE — ED NOTES
Patient resting in stretcher. In no acute distress. Patient remains tachycardic between 105-115 bpm.  Dr. Saritha Zamudio aware, would like to be notified for HR >120bpm sustained. Call bell within reach. Will continue to monitor.

## 2020-02-11 NOTE — PROGRESS NOTES
Lovenox Monitoring  Indication: DVT Prophylaxis  Recent Labs     02/11/20  0307 02/10/20  2212   HGB 13.5 15.7   * 141*   CREA 2.41* 2.91*     Current Weight: 78.4 kg  Est. CrCl = 30-35 ml/min  Current Dose: 30 mg subcutaneously every 24 hours. Plan: Change to enoxaparin 40mg sc q24h.      Rome Fernández

## 2020-02-12 ENCOUNTER — APPOINTMENT (OUTPATIENT)
Dept: ULTRASOUND IMAGING | Age: 60
DRG: 139 | End: 2020-02-12
Attending: HOSPITALIST
Payer: MEDICAID

## 2020-02-12 LAB
ANION GAP SERPL CALC-SCNC: 8 MMOL/L (ref 5–15)
APPEARANCE UR: CLEAR
BACTERIA URNS QL MICRO: NEGATIVE /HPF
BILIRUB UR QL: NEGATIVE
BUN SERPL-MCNC: 21 MG/DL (ref 6–20)
BUN/CREAT SERPL: 8 (ref 12–20)
CALCIUM SERPL-MCNC: 8.7 MG/DL (ref 8.5–10.1)
CHLORIDE SERPL-SCNC: 105 MMOL/L (ref 97–108)
CO2 SERPL-SCNC: 21 MMOL/L (ref 21–32)
COLOR UR: ABNORMAL
CREAT SERPL-MCNC: 2.74 MG/DL (ref 0.7–1.3)
CREAT UR-MCNC: 60.6 MG/DL
EPITH CASTS URNS QL MICRO: ABNORMAL /LPF
GLUCOSE BLD STRIP.AUTO-MCNC: 114 MG/DL (ref 65–100)
GLUCOSE BLD STRIP.AUTO-MCNC: 123 MG/DL (ref 65–100)
GLUCOSE BLD STRIP.AUTO-MCNC: 147 MG/DL (ref 65–100)
GLUCOSE BLD STRIP.AUTO-MCNC: 219 MG/DL (ref 65–100)
GLUCOSE SERPL-MCNC: 261 MG/DL (ref 65–100)
GLUCOSE UR STRIP.AUTO-MCNC: NEGATIVE MG/DL
HGB UR QL STRIP: NEGATIVE
HYALINE CASTS URNS QL MICRO: ABNORMAL /LPF (ref 0–5)
KETONES UR QL STRIP.AUTO: NEGATIVE MG/DL
LEUKOCYTE ESTERASE UR QL STRIP.AUTO: NEGATIVE
NITRITE UR QL STRIP.AUTO: NEGATIVE
PH UR STRIP: 5 [PH] (ref 5–8)
POTASSIUM SERPL-SCNC: 4.4 MMOL/L (ref 3.5–5.1)
PROT UR STRIP-MCNC: 30 MG/DL
PROT UR-MCNC: 35 MG/DL (ref 0–11.9)
PROT/CREAT UR-RTO: 0.6
RBC #/AREA URNS HPF: ABNORMAL /HPF (ref 0–5)
SERVICE CMNT-IMP: ABNORMAL
SODIUM SERPL-SCNC: 134 MMOL/L (ref 136–145)
SP GR UR REFRACTOMETRY: 1.01 (ref 1–1.03)
UROBILINOGEN UR QL STRIP.AUTO: 0.2 EU/DL (ref 0.2–1)
WBC URNS QL MICRO: ABNORMAL /HPF (ref 0–4)

## 2020-02-12 PROCEDURE — 74011636637 HC RX REV CODE- 636/637: Performed by: HOSPITALIST

## 2020-02-12 PROCEDURE — 76770 US EXAM ABDO BACK WALL COMP: CPT

## 2020-02-12 PROCEDURE — 82962 GLUCOSE BLOOD TEST: CPT

## 2020-02-12 PROCEDURE — 80048 BASIC METABOLIC PNL TOTAL CA: CPT

## 2020-02-12 PROCEDURE — 74011250637 HC RX REV CODE- 250/637: Performed by: STUDENT IN AN ORGANIZED HEALTH CARE EDUCATION/TRAINING PROGRAM

## 2020-02-12 PROCEDURE — 94640 AIRWAY INHALATION TREATMENT: CPT

## 2020-02-12 PROCEDURE — 94760 N-INVAS EAR/PLS OXIMETRY 1: CPT

## 2020-02-12 PROCEDURE — 74011636637 HC RX REV CODE- 636/637: Performed by: STUDENT IN AN ORGANIZED HEALTH CARE EDUCATION/TRAINING PROGRAM

## 2020-02-12 PROCEDURE — 77010033678 HC OXYGEN DAILY

## 2020-02-12 PROCEDURE — 74011250636 HC RX REV CODE- 250/636: Performed by: HOSPITALIST

## 2020-02-12 PROCEDURE — 74011250637 HC RX REV CODE- 250/637: Performed by: HOSPITALIST

## 2020-02-12 PROCEDURE — 74011000250 HC RX REV CODE- 250: Performed by: HOSPITALIST

## 2020-02-12 PROCEDURE — 36415 COLL VENOUS BLD VENIPUNCTURE: CPT

## 2020-02-12 PROCEDURE — 74011250636 HC RX REV CODE- 250/636: Performed by: STUDENT IN AN ORGANIZED HEALTH CARE EDUCATION/TRAINING PROGRAM

## 2020-02-12 PROCEDURE — 81001 URINALYSIS AUTO W/SCOPE: CPT

## 2020-02-12 PROCEDURE — 65660000000 HC RM CCU STEPDOWN

## 2020-02-12 PROCEDURE — 74011000258 HC RX REV CODE- 258: Performed by: STUDENT IN AN ORGANIZED HEALTH CARE EDUCATION/TRAINING PROGRAM

## 2020-02-12 PROCEDURE — 84156 ASSAY OF PROTEIN URINE: CPT

## 2020-02-12 RX ADMIN — CEFTRIAXONE 1 G: 1 INJECTION, POWDER, FOR SOLUTION INTRAMUSCULAR; INTRAVENOUS at 00:14

## 2020-02-12 RX ADMIN — IPRATROPIUM BROMIDE AND ALBUTEROL SULFATE 3 ML: .5; 3 SOLUTION RESPIRATORY (INHALATION) at 19:44

## 2020-02-12 RX ADMIN — PREDNISONE 40 MG: 20 TABLET ORAL at 08:43

## 2020-02-12 RX ADMIN — IPRATROPIUM BROMIDE AND ALBUTEROL SULFATE 3 ML: .5; 3 SOLUTION RESPIRATORY (INHALATION) at 01:14

## 2020-02-12 RX ADMIN — FAMOTIDINE 20 MG: 20 TABLET ORAL at 22:57

## 2020-02-12 RX ADMIN — ENOXAPARIN SODIUM 40 MG: 40 INJECTION SUBCUTANEOUS at 08:45

## 2020-02-12 RX ADMIN — IPRATROPIUM BROMIDE AND ALBUTEROL SULFATE 3 ML: .5; 3 SOLUTION RESPIRATORY (INHALATION) at 07:42

## 2020-02-12 RX ADMIN — MORPHINE SULFATE 1 MG: 2 INJECTION, SOLUTION INTRAMUSCULAR; INTRAVENOUS at 10:25

## 2020-02-12 RX ADMIN — CARVEDILOL 12.5 MG: 12.5 TABLET, FILM COATED ORAL at 08:43

## 2020-02-12 RX ADMIN — GABAPENTIN 100 MG: 100 CAPSULE ORAL at 16:35

## 2020-02-12 RX ADMIN — Medication 10 ML: at 06:00

## 2020-02-12 RX ADMIN — BUDESONIDE INHALATION 500 MCG: 0.5 SUSPENSION RESPIRATORY (INHALATION) at 07:42

## 2020-02-12 RX ADMIN — CARVEDILOL 12.5 MG: 12.5 TABLET, FILM COATED ORAL at 16:35

## 2020-02-12 RX ADMIN — Medication 10 ML: at 15:08

## 2020-02-12 RX ADMIN — Medication 100 MG: at 08:43

## 2020-02-12 RX ADMIN — HUMAN INSULIN 2 UNITS: 100 INJECTION, SOLUTION SUBCUTANEOUS at 16:34

## 2020-02-12 RX ADMIN — AZITHROMYCIN MONOHYDRATE 500 MG: 500 INJECTION, POWDER, LYOPHILIZED, FOR SOLUTION INTRAVENOUS at 00:13

## 2020-02-12 RX ADMIN — BUDESONIDE INHALATION 500 MCG: 0.5 SUSPENSION RESPIRATORY (INHALATION) at 19:44

## 2020-02-12 RX ADMIN — GABAPENTIN 100 MG: 100 CAPSULE ORAL at 08:43

## 2020-02-12 RX ADMIN — HUMAN INSULIN 3 UNITS: 100 INJECTION, SOLUTION SUBCUTANEOUS at 11:12

## 2020-02-12 RX ADMIN — Medication 10 ML: at 22:57

## 2020-02-12 RX ADMIN — FOLIC ACID 1 MG: 1 TABLET ORAL at 08:43

## 2020-02-12 RX ADMIN — GABAPENTIN 100 MG: 100 CAPSULE ORAL at 23:07

## 2020-02-12 RX ADMIN — CEFTRIAXONE 1 G: 1 INJECTION, POWDER, FOR SOLUTION INTRAMUSCULAR; INTRAVENOUS at 23:02

## 2020-02-12 NOTE — CONSULTS
Cardiology Consult Note    CC: SOB  Reason for consult:  Cardiomyopathy/SVT  Requesting MD:  Dr. Patricia Whatley     Subjective:      Date of  Admission: 2/10/2020  9:59 PM     Admission type:Emergency    Tim Weems is a 64 y.o. male admitted for Pneumonia [J18.9]  SVT (supraventricular tachycardia) ([facility] [address] [I47.1]. Patient complains of Lt buttock discomfort radiating down to Lt leg with paresthesia. He has has worsening SOB/GRIFFIN over last three months or more. He was found to be in SVT with HR in 180s that stopped with IV Adenocard. He is on Coreg after abnormal echo which showed cardiomyopathy with EF 25% and moderate MR. He denies any prior h/o MI, CHF, arrhythmia. He has 35 to 40 py h/o smoking stopped three years ago but now he is drinking 2 to 3 beers a day. He is now aware of his CKD. He gets some care at [facility] but no record available. His mother  of MI. Patient Active Problem List    Diagnosis Date Noted    SVT (supraventricular tachycardia) ([facility] [address] 02/10/2020    Pneumonia 02/10/2020      None  Past Medical History:   Diagnosis Date    Hypotension     Vertigo       History reviewed. No pertinent surgical history. No Known Allergies   History reviewed. No pertinent family history.    Current Facility-Administered Medications   Medication Dose Route Frequency    famotidine (PEPCID) tablet 20 mg  20 mg Oral QHS    sodium chloride (NS) flush 5-40 mL  5-40 mL IntraVENous Q8H    sodium chloride (NS) flush 5-40 mL  5-40 mL IntraVENous PRN    acetaminophen (TYLENOL) tablet 650 mg  650 mg Oral Q4H PRN    morphine injection 1 mg  1 mg IntraVENous Q4H PRN    ondansetron (ZOFRAN) injection 4 mg  4 mg IntraVENous Q4H PRN    insulin regular (NOVOLIN R, HUMULIN R) injection   SubCUTAneous AC&HS    glucose chewable tablet 16 g  4 Tab Oral PRN    glucagon (GLUCAGEN) injection 1 mg  1 mg IntraMUSCular PRN    dextrose 10% infusion 0-250 mL  0-250 mL IntraVENous PRN    albuterol-ipratropium (DUO-NEB) 2.5 MG-0.5 MG/3 ML 3 mL Nebulization Q4H PRN    gabapentin (NEURONTIN) capsule 100 mg  100 mg Oral TID    enoxaparin (LOVENOX) injection 40 mg  40 mg SubCUTAneous DAILY    albuterol-ipratropium (DUO-NEB) 2.5 MG-0.5 MG/3 ML  3 mL Nebulization Q6H RT    predniSONE (DELTASONE) tablet 40 mg  40 mg Oral DAILY WITH BREAKFAST    budesonide (PULMICORT) 500 mcg/2 ml nebulizer suspension  500 mcg Nebulization BID RT    carvediloL (COREG) tablet 12.5 mg  12.5 mg Oral BID WITH MEALS    benzonatate (TESSALON) capsule 100 mg  100 mg Oral TID PRN    LORazepam (ATIVAN) tablet 2 mg  2 mg Oral Q1H PRN    LORazepam (ATIVAN) tablet 4 mg  4 mg Oral D1I PRN    folic acid (FOLVITE) tablet 1 mg  1 mg Oral DAILY    thiamine HCL (B-1) tablet 100 mg  100 mg Oral DAILY    cefTRIAXone (ROCEPHIN) 1 g in 0.9% sodium chloride (MBP/ADV) 50 mL  1 g IntraVENous Q24H    azithromycin (ZITHROMAX) 500 mg in 0.9% sodium chloride (MBP/ADV) 250 mL  500 mg IntraVENous Q24H        Prior to Admission Medications:  Prior to Admission medications    Not on File        Review of Symptoms:  Except as noted in HPI, patient denies recent fever or chills, nausea, vomiting, diarrhea, hemoptysis, hematemesis, dysuria, myalgias, focal neurologic symptoms, ecchymosis, angioedema, odynophagia, dysphagia, sore throat, earache,rash, melena, hematochezia, depression, GERD, cold intolerance, petechia, bleeding gums, or significant weight loss. A comprehensive review of systems was negative except for that written in the HPI.      Subjective:    24 hr VS reviewed, overall VSSAF  Temp (24hrs), Av °F (36.7 °C), Min:97.5 °F (36.4 °C), Max:98.4 °F (36.9 °C)    Patient Vitals for the past 8 hrs:   Pulse   20 0721 (!) 56    Patient Vitals for the past 8 hrs:   Resp   20 0721 18    Patient Vitals for the past 8 hrs:   BP   20 0721 131/82          Intake/Output Summary (Last 24 hours) at 2020 1052  Last data filed at 2020 1515  Gross per 24 hour   Intake 240 ml   Output 1000 ml   Net -760 ml         Physical Exam (complete single organ system exam)    Visit Vitals  /82 (BP 1 Location: Right arm, BP Patient Position: At rest)   Pulse (!) 56   Temp 97.8 °F (36.6 °C)   Resp 18   Ht 5' 9\" (1.753 m)   Wt 174 lb 2.6 oz (79 kg)   SpO2 97%   BMI 25.72 kg/m²     General Appearance:  Well developed, well nourished,alert and oriented x 3, and individual in no acute distress. Ears/Nose/Mouth/Throat:   Hearing grossly normal.         Neck: Supple. Chest:   Lungs rales at both bases   Cardiovascular:  Regular rate and rhythm, S1, S2 normal, s3 gallop   Abdomen:   Soft, non-tender, bowel sounds are active. Extremities: No edema bilaterally. Skin: Warm and dry.                Cardiographics    Telemetry: normal sinus rhythm  ECG: normal EKG, normal sinus rhythm, unchanged from previous tracings  Echocardiogram: Abnormal, and reviewed by myself:     Labs:   Recent Results (from the past 24 hour(s))   GLUCOSE, POC    Collection Time: 02/11/20 11:07 AM   Result Value Ref Range    Glucose (POC) 109 (H) 65 - 100 mg/dL    Performed by Isabelle Sullivan    ECHO ADULT COMPLETE    Collection Time: 02/11/20  3:07 PM   Result Value Ref Range    LA Volume 60.14 18 - 58 mL    LV E' Lateral Velocity 7.30 cm/s    LV E' Septal Velocity 4.79 cm/s    Tapse 2.42 (A) 1.5 - 2.0 cm    Ao Root D 3.16 cm    Aortic Valve Systolic Peak Velocity 414.09 cm/s    Aortic Valve Area by Continuity of Peak Velocity 2.5 cm2    AoV PG 8.6 mmHg    LVIDd 5.54 4.2 - 5.9 cm    LVPWd 0.82 0.6 - 1.0 cm    LVIDs 5.26 cm    IVSd 0.88 0.6 - 1.0 cm    LVOT d 2.27 cm    LVOT Peak Velocity 92.47 cm/s    LVOT Peak Gradient 3.4 mmHg    MVA (PHT) 4.1 cm2    MV A Lam 42.99 cm/s    MV E Lam 127.52 cm/s    MV E/A 2.97     Left Atrium to Aortic Root Ratio 1.25     RVIDd 4.16 cm    LA Vol 4C 47.32 18 - 58 mL    LA Vol 2C 63.17 (A) 18 - 58 mL    LA Area 4C 18.4 cm2    LV Mass .7 88 - 224 g    LV Mass AL Index 105.4 49 - 115 g/m2    E/E' lateral 17.47     E/E' septal 26.62     RVSP 34.0 mmHg    E/E' ratio (averaged) 22.05     Est. RA Pressure 10.0 mmHg    Mitral Valve E Wave Deceleration Time 187.1 ms    Mitral Valve Pressure Half-time 54.3 ms    Left Atrium Major Axis 3.95 cm    Triscuspid Valve Regurgitation Peak Gradient 24.0 mmHg    Pulmonic Valve Max Velocity 76.74 cm/s    TR Max Velocity 245.20 cm/s    PISA MR Rad 0.47 cm    LA Vol Index 30.98 16 - 28 ml/m2    PASP 34.0 mmHg    LA Vol Index 32.54 16 - 28 ml/m2    LA Vol Index 24.37 16 - 28 ml/m2    Left Ventricular Fractional Shortening by 2D 3.7443310698 %    Mitral Valve Deceleration [place] 4.6669299502478     AV Velocity Ratio 0.63     PV peak gradient 2.4 mmHg   GLUCOSE, POC    Collection Time: 02/11/20  4:01 PM   Result Value Ref Range    Glucose (POC) 119 (H) 65 - 100 mg/dL    Performed by Kamille Díaz    NT-PRO BNP    Collection Time: 02/11/20  6:00 PM   Result Value Ref Range    NT pro-BNP 2,809 (H) <125 PG/ML   POC EG7    Collection Time: 02/11/20  6:09 PM   Result Value Ref Range    Calcium, ionized (POC) 1.27 1.12 - 1.32 mmol/L    pH (POC) 7.380 7.35 - 7.45      pCO2 (POC) 36.5 35.0 - 45.0 MMHG    pO2 (POC) 53 (L) 80 - 100 MMHG    HCO3 (POC) 21.6 (L) 22 - 26 MMOL/L    Base deficit (POC) 3 mmol/L    sO2 (POC) 86 (L) 92 - 97 %    Site LEFT BRACHIAL      Device: ROOM AIR      Allens test (POC) N/A      Specimen type (POC) ARTERIAL     GLUCOSE, POC    Collection Time: 02/11/20 10:21 PM   Result Value Ref Range    Glucose (POC) 188 (H) 65 - 100 mg/dL    Performed by Barak Noe BASIC    Collection Time: 02/12/20  2:37 AM   Result Value Ref Range    Sodium 134 (L) 136 - 145 mmol/L    Potassium 4.4 3.5 - 5.1 mmol/L    Chloride 105 97 - 108 mmol/L    CO2 21 21 - 32 mmol/L    Anion gap 8 5 - 15 mmol/L    Glucose 261 (H) 65 - 100 mg/dL    BUN 21 (H) 6 - 20 MG/DL    Creatinine 2.74 (H) 0.70 - 1.30 MG/DL    BUN/Creatinine ratio 8 (L) 12 - 20 GFR est AA 29 (L) >60 ml/min/1.73m2    GFR est non-AA 24 (L) >60 ml/min/1.73m2    Calcium 8.7 8.5 - 10.1 MG/DL   GLUCOSE, POC    Collection Time: 02/12/20  6:47 AM   Result Value Ref Range    Glucose (POC) 123 (H) 65 - 100 mg/dL    Performed by Jamaal Malcolm         Assessment:     Assessment:   Cardiomyopathy; idiopathic; related to his ETOH abuse  CHF; acute systolic with elevated BNP  SVT; no further recurrence  Heavy smoking history; now admitted with PNA  ETOH abuse  CKD; stage 4      Plan:   Tele  Agree with Coreg  He needs RHC/LHCn with minimal use of contrast  Would avoid ACEI/ARB due to renal failure    Risa Carrera MD

## 2020-02-12 NOTE — PROGRESS NOTES
Bedside and Verbal shift change report given to [facility] (oncoming nurse) by Lizbet Reveles (offgoing nurse). Report included the following information SBAR, MAR and Med Rec Status.

## 2020-02-12 NOTE — PROGRESS NOTES
2000: Patient asked for Ginger ale, explained the purpose of a low sugar and low carb diet with A1C of 6.0. Patient understood and was accepting of information, but declined diet ginger ale. Wanted regular ginger ale and crackers with jelly.

## 2020-02-12 NOTE — CONSULTS
Seen and examined  Thanks for the consult  A/P:  CKD ,?  Baseline ,likely stage 4  No medical follow up  HTN,likely DM  cardiomyopahty EF 25 %  PNA    Urine testing  SPEP,FLC  Will follow

## 2020-02-12 NOTE — CDMP QUERY
Patient admitted with  Community acquired PNA and left hip sciatica noted to have  tachypnea and hypoxia. If possible, please document in progress notes and d/c summary if you are evaluating and/or treating any of the following: 
 
=> Acute Hypoxemic Respiratory Failure 
=> Acute Hypercapnic Respiratory Failure 
=> Other Explanation of clinical findings 
=> Clinically Undetermined (no explanation for clinical findings) The medical record reflects the following: 
   Risk Factors: 63 yo M admitted with CAP, ? COPD Clinical Indicators:  2/11 RR 32 O2 sat 85% On 2L O2 via NC    
           ABGs showed CO2 58  HCO3 21.6 2/11 PN states \"Patient noted to have trouble of breathing , sat in                      upper 80's Rales on PE \"  2/11 @ 0720 NN \"Patient coughing, O2 sats in the mid 80's on room air \" Treatment: O2 @ 2 L Via NC, Duo nebs, Thank you for your time [facility] RN/BSN, CCDS Desk:   [phone] Other:  [phone]

## 2020-02-12 NOTE — PROGRESS NOTES
2/12/2020 -   CARMELA:  - CM notes patient's transfer to  from ED  - Patient continues with IV steroid, ICS, and bronchodilators  - Cultures pending  - Cardiac Consult pending for today, 2/12  - CM notes that patient is homeless and, per 2/11 CM note, has received housing resource  - Patient to contact [facility]  to discharge  - Patient will likely need transport assistance  - CM to follow for transitions of care planning  CRM: Yuni Bess, MPH, [address] [place] [address]; Z: [phone]

## 2020-02-12 NOTE — CDMP QUERY
Pt admitted with Community Acquired Pneumonia. If possible, please document in the progress notes and d/c summary the type/causeof PNA if known. 
 
=> Gram negative pneumonia  
=> Gram positive pneumonia 
=> MRSA pneumonia 
=> Bacterial pneumonia  
=> Viral pneumonia 
=> Aspiration pneumonia 
=> Hypostatic pneumonia 
=> Other (please specify) 
=> Unknown The medical record reflects the following: 
 Risk Factors: 65 yo homeless man admitted with Right perihilar  PNA and ?COPD Clinical Indicators: CXR: Right perihilar pneumonia  No sputum cx found Treatment: IV Ceftriaxone and azithromycin, bronchodilators,ICS and prednisone Thank you for your time [facility] RN/BSN, CCDS Desk:   [phone] Other:  [phone]

## 2020-02-12 NOTE — PROGRESS NOTES
This patient does not wish to proceed with tomorrow cath at 7 a.m. I attempted to educate patient on the necessity of the procedure. He states that he will wait and that he is usually right about his intuition. Notified the hospitalist. Will pass along message to let cardiology know in the morning.

## 2020-02-12 NOTE — PROGRESS NOTES
[facility]  Hospitalist Group                                                                                          Hospitalist Progress Note  Lisa Ramon MD  Answering service: [phone] from in house phone        Date of Service:  2020  NAME:  Galindo Lopez  :  1960  MRN:  [id number]      Admission Summary: This 82-year-old gentleman came to the emergency room via EMS complaining of left hip pain and burning for a few weeks. When EMS arrived, patient's heart rate was sustained in the 180s. Interval history / Subjective:    Patient coughing and wheezing. The cough has been going on for 2-3 weeks. He has smoked for several years until he quitted 5 years ago. Never been diagnosed with copd/emphysema     Assessment & Plan:   Right perihilar pneumonia, CAP  Acute wheezy bronchitis?undiagnosed copd  Ceftriaxone and azithromycin  Schedule bronchodilators,ICS and prednisone. ABG  Blood culture no growth thus far    Acute hypoxic resp failure(tachypnea, hypoxia),POA, due to CAP  -ABG w/o CO2 retention  -wean oxygen for spo2>90%    Severe cardiomyopathy,no clinical CHF  -Echocardiogram reported EF of 25-30%  -Needs cardiac evaluation,follow up,thus will go ahead and ask cardiology to see. -Start coreg. NO ace-I/arb due to ckd  -Cardiac cath am,     Supraventricular tachycardia  TSH wnl   UDS :Negative   Received adenocard. HR low 100. Start coreg      CKD IV.patient says he was told of \"kidney not working properly,\"3-4 years ago,but did not have follow up sience  Avoid nephrotoxic drugs, renally dose meds  He needs follow up with nephrologist and pcp.       Prediabetes. A1C 6.counseled on healthy eating,carb limitation   Left LE shooting pain,sciatica  -Exam is benign without swelling, deformity.   Range of motion is intact  -Try gabapentin   -PT/OT       Homelessness  Case management consult     Patient's Baseline: Ambulates with walking  DVT ppx: Lovenox  Code status: Full  Disposition: TBD. Currently homeless. Care plan discussed with patient/family and nurse.  [facility] Problems  Never Reviewed          Codes Class Noted POA    SVT (supraventricular tachycardia) ([facility] [address] ICD-10-CM: I47.1  ICD-9-CM: 427.89  2/10/2020 Unknown        Pneumonia ICD-10-CM: J18.9  ICD-9-CM: 007  2/10/2020 Unknown                Review of Systems:   A comprehensive review of systems was negative except for that written in the HPI. Vital Signs:    Last 24hrs VS reviewed since prior progress note. Most recent are:  Visit Vitals  /87 (BP 1 Location: Right arm, BP Patient Position: At rest)   Pulse 71   Temp 98.2 °F (36.8 °C)   Resp 18   Ht 5' 9\" (1.753 m)   Wt 79 kg (174 lb 2.6 oz)   SpO2 96%   BMI 25.72 kg/m²         Intake/Output Summary (Last 24 hours) at 2/12/2020 1402  Last data filed at 2/11/2020 1515  Gross per 24 hour   Intake 240 ml   Output 200 ml   Net 40 ml        Physical Examination:             Constitutional:  No acute distress, cooperative, pleasant    ENT:  Oral mucosa moist, oropharynx benign. Resp:  Improved wheezing. CV:  Regular rhythm, normal rate, no murmurs, gallops, rubs    GI:  Soft, non distended, non tender. normoactive bowel sounds, no hepatosplenomegaly     Musculoskeletal:  No swelling, deformity or edema on the left lower extremity. Range of motion is intact. Neurologic:  Moves all extremities. AAOx3, CN II-XII reviewed     Psych:  Good insight, Not anxious nor agitated.        Data Review:    Review and/or order of clinical lab test  Review and/or order of tests in the radiology section of CPT  Review and/or order of tests in the medicine section of CPT      Labs:     Recent Labs     02/11/20  0307 02/10/20  2212   WBC 6.9 6.4   HGB 13.5 15.7   HCT 41.2 47.4   * 141*     Recent Labs     02/12/20  0237 02/11/20  0307 02/10/20  2212   * 140 139   K 4.4 4.4 4.4    113* 107   CO2 21 20* 25   BUN 21* 22* 25*   CREA 2.74* 2.41* 2.91* * 115* 217*   CA 8.7 8.4* 9.5   MG  --  1.7 1.8     Recent Labs     02/10/20  2212   SGOT 32   ALT 34      TBILI 0.4   TP 7.0   ALB 3.2*   GLOB 3.8     No results for input(s): INR, PTP, APTT, INREXT, INREXT in the last 72 hours. No results for input(s): FE, TIBC, PSAT, FERR in the last 72 hours. No results found for: FOL, RBCF   No results for input(s): PH, PCO2, PO2 in the last 72 hours.   Recent Labs     02/11/20  0307 02/10/20  2212   TROIQ 0.06* <0.05     No results found for: CHOL, CHOLX, CHLST, CHOLV, HDL, HDLP, LDL, LDLC, DLDLP, TGLX, TRIGL, TRIGP, CHHD, CHHDX  Lab Results   Component Value Date/Time    Glucose (POC) 219 (H) 02/12/2020 11:05 AM    Glucose (POC) 123 (H) 02/12/2020 06:47 AM    Glucose (POC) 188 (H) 02/11/2020 10:21 PM    Glucose (POC) 119 (H) 02/11/2020 04:01 PM    Glucose (POC) 109 (H) 02/11/2020 11:07 AM     No results found for: COLOR, APPRN, SPGRU, REFSG, ARIANA, PROTU, GLUCU, KETU, BILU, UROU, LUCAS, LEUKU, GLUKE, EPSU, BACTU, WBCU, RBCU, CASTS, UCRY      Medications Reviewed:     Current Facility-Administered Medications   Medication Dose Route Frequency    famotidine (PEPCID) tablet 20 mg  20 mg Oral QHS    sodium chloride (NS) flush 5-40 mL  5-40 mL IntraVENous Q8H    sodium chloride (NS) flush 5-40 mL  5-40 mL IntraVENous PRN    acetaminophen (TYLENOL) tablet 650 mg  650 mg Oral Q4H PRN    morphine injection 1 mg  1 mg IntraVENous Q4H PRN    ondansetron (ZOFRAN) injection 4 mg  4 mg IntraVENous Q4H PRN    insulin regular (NOVOLIN R, HUMULIN R) injection   SubCUTAneous AC&HS    glucose chewable tablet 16 g  4 Tab Oral PRN    glucagon (GLUCAGEN) injection 1 mg  1 mg IntraMUSCular PRN    dextrose 10% infusion 0-250 mL  0-250 mL IntraVENous PRN    albuterol-ipratropium (DUO-NEB) 2.5 MG-0.5 MG/3 ML  3 mL Nebulization Q4H PRN    gabapentin (NEURONTIN) capsule 100 mg  100 mg Oral TID    enoxaparin (LOVENOX) injection 40 mg  40 mg SubCUTAneous DAILY    albuterol-ipratropium (DUO-NEB) 2.5 MG-0.5 MG/3 ML  3 mL Nebulization Q6H RT    predniSONE (DELTASONE) tablet 40 mg  40 mg Oral DAILY WITH BREAKFAST    budesonide (PULMICORT) 500 mcg/2 ml nebulizer suspension  500 mcg Nebulization BID RT    carvediloL (COREG) tablet 12.5 mg  12.5 mg Oral BID WITH MEALS    benzonatate (TESSALON) capsule 100 mg  100 mg Oral TID PRN    LORazepam (ATIVAN) tablet 2 mg  2 mg Oral Q1H PRN    LORazepam (ATIVAN) tablet 4 mg  4 mg Oral P7J PRN    folic acid (FOLVITE) tablet 1 mg  1 mg Oral DAILY    thiamine HCL (B-1) tablet 100 mg  100 mg Oral DAILY    cefTRIAXone (ROCEPHIN) 1 g in 0.9% sodium chloride (MBP/ADV) 50 mL  1 g IntraVENous Q24H    azithromycin (ZITHROMAX) 500 mg in 0.9% sodium chloride (MBP/ADV) 250 mL  500 mg IntraVENous Q24H     ______________________________________________________________________  EXPECTED LENGTH OF STAY: 3d 7h  ACTUAL LENGTH OF STAY:          2                 Maida Cordero MD

## 2020-02-13 LAB
ANION GAP SERPL CALC-SCNC: 7 MMOL/L (ref 5–15)
BUN SERPL-MCNC: 24 MG/DL (ref 6–20)
BUN/CREAT SERPL: 10 (ref 12–20)
CALCIUM SERPL-MCNC: 9.1 MG/DL (ref 8.5–10.1)
CHLORIDE SERPL-SCNC: 105 MMOL/L (ref 97–108)
CO2 SERPL-SCNC: 22 MMOL/L (ref 21–32)
CREAT SERPL-MCNC: 2.41 MG/DL (ref 0.7–1.3)
GLUCOSE BLD STRIP.AUTO-MCNC: 102 MG/DL (ref 65–100)
GLUCOSE BLD STRIP.AUTO-MCNC: 117 MG/DL (ref 65–100)
GLUCOSE BLD STRIP.AUTO-MCNC: 144 MG/DL (ref 65–100)
GLUCOSE BLD STRIP.AUTO-MCNC: 185 MG/DL (ref 65–100)
GLUCOSE SERPL-MCNC: 168 MG/DL (ref 65–100)
POTASSIUM SERPL-SCNC: 4.4 MMOL/L (ref 3.5–5.1)
SERVICE CMNT-IMP: ABNORMAL
SODIUM SERPL-SCNC: 134 MMOL/L (ref 136–145)

## 2020-02-13 PROCEDURE — 65660000000 HC RM CCU STEPDOWN

## 2020-02-13 PROCEDURE — 94640 AIRWAY INHALATION TREATMENT: CPT

## 2020-02-13 PROCEDURE — 80048 BASIC METABOLIC PNL TOTAL CA: CPT

## 2020-02-13 PROCEDURE — 82962 GLUCOSE BLOOD TEST: CPT

## 2020-02-13 PROCEDURE — 74011250636 HC RX REV CODE- 250/636: Performed by: STUDENT IN AN ORGANIZED HEALTH CARE EDUCATION/TRAINING PROGRAM

## 2020-02-13 PROCEDURE — 74011636637 HC RX REV CODE- 636/637: Performed by: STUDENT IN AN ORGANIZED HEALTH CARE EDUCATION/TRAINING PROGRAM

## 2020-02-13 PROCEDURE — 36415 COLL VENOUS BLD VENIPUNCTURE: CPT

## 2020-02-13 PROCEDURE — 74011250637 HC RX REV CODE- 250/637: Performed by: STUDENT IN AN ORGANIZED HEALTH CARE EDUCATION/TRAINING PROGRAM

## 2020-02-13 PROCEDURE — 74011636637 HC RX REV CODE- 636/637: Performed by: HOSPITALIST

## 2020-02-13 PROCEDURE — 83883 ASSAY NEPHELOMETRY NOT SPEC: CPT

## 2020-02-13 PROCEDURE — 77010033678 HC OXYGEN DAILY

## 2020-02-13 PROCEDURE — 74011250636 HC RX REV CODE- 250/636: Performed by: HOSPITALIST

## 2020-02-13 PROCEDURE — 82784 ASSAY IGA/IGD/IGG/IGM EACH: CPT

## 2020-02-13 PROCEDURE — 97161 PT EVAL LOW COMPLEX 20 MIN: CPT

## 2020-02-13 PROCEDURE — 97110 THERAPEUTIC EXERCISES: CPT

## 2020-02-13 PROCEDURE — 74011250637 HC RX REV CODE- 250/637: Performed by: HOSPITALIST

## 2020-02-13 PROCEDURE — 94760 N-INVAS EAR/PLS OXIMETRY 1: CPT

## 2020-02-13 PROCEDURE — 74011000250 HC RX REV CODE- 250: Performed by: HOSPITALIST

## 2020-02-13 RX ADMIN — FOLIC ACID 1 MG: 1 TABLET ORAL at 09:16

## 2020-02-13 RX ADMIN — GABAPENTIN 100 MG: 100 CAPSULE ORAL at 17:09

## 2020-02-13 RX ADMIN — IPRATROPIUM BROMIDE AND ALBUTEROL SULFATE 3 ML: .5; 3 SOLUTION RESPIRATORY (INHALATION) at 01:34

## 2020-02-13 RX ADMIN — HUMAN INSULIN 2 UNITS: 100 INJECTION, SOLUTION SUBCUTANEOUS at 17:11

## 2020-02-13 RX ADMIN — AZITHROMYCIN MONOHYDRATE 500 MG: 500 INJECTION, POWDER, LYOPHILIZED, FOR SOLUTION INTRAVENOUS at 00:15

## 2020-02-13 RX ADMIN — ACETAMINOPHEN 650 MG: 325 TABLET ORAL at 22:59

## 2020-02-13 RX ADMIN — BUDESONIDE INHALATION 500 MCG: 0.5 SUSPENSION RESPIRATORY (INHALATION) at 07:24

## 2020-02-13 RX ADMIN — CARVEDILOL 12.5 MG: 12.5 TABLET, FILM COATED ORAL at 17:09

## 2020-02-13 RX ADMIN — IPRATROPIUM BROMIDE AND ALBUTEROL SULFATE 3 ML: .5; 3 SOLUTION RESPIRATORY (INHALATION) at 14:38

## 2020-02-13 RX ADMIN — Medication 10 ML: at 13:32

## 2020-02-13 RX ADMIN — IPRATROPIUM BROMIDE AND ALBUTEROL SULFATE 3 ML: .5; 3 SOLUTION RESPIRATORY (INHALATION) at 07:24

## 2020-02-13 RX ADMIN — ACETAMINOPHEN 650 MG: 325 TABLET ORAL at 17:10

## 2020-02-13 RX ADMIN — GABAPENTIN 100 MG: 100 CAPSULE ORAL at 09:16

## 2020-02-13 RX ADMIN — FAMOTIDINE 20 MG: 20 TABLET ORAL at 22:54

## 2020-02-13 RX ADMIN — IPRATROPIUM BROMIDE AND ALBUTEROL SULFATE 3 ML: .5; 3 SOLUTION RESPIRATORY (INHALATION) at 19:39

## 2020-02-13 RX ADMIN — CARVEDILOL 12.5 MG: 12.5 TABLET, FILM COATED ORAL at 09:16

## 2020-02-13 RX ADMIN — Medication 10 ML: at 06:32

## 2020-02-13 RX ADMIN — Medication 10 ML: at 22:55

## 2020-02-13 RX ADMIN — Medication 100 MG: at 09:16

## 2020-02-13 RX ADMIN — BUDESONIDE INHALATION 500 MCG: 0.5 SUSPENSION RESPIRATORY (INHALATION) at 19:40

## 2020-02-13 RX ADMIN — GABAPENTIN 100 MG: 100 CAPSULE ORAL at 22:54

## 2020-02-13 RX ADMIN — PREDNISONE 40 MG: 20 TABLET ORAL at 09:16

## 2020-02-13 RX ADMIN — ENOXAPARIN SODIUM 40 MG: 40 INJECTION SUBCUTANEOUS at 09:17

## 2020-02-13 NOTE — CONSULTS
[address]    Name:  Saira Cheng  MR#:  [id number]  :  1960  ACCOUNT #:  [de-identified]  DATE OF SERVICE:  2020    REASON FOR CONSULTATION:  Seen for renal failure. Thanks for the consult. HISTORY OF PRESENT ILLNESS:  We had the pleasure of seeing the patient. He is a very pleasant natalio. He says he does not see any doctors on a regular basis, last admission was five years ago to [facility]. He came to the hospital with left lower leg pain and has been coughing and wheezing, and Cardiology is seeing the patient for his low EF, thought to be related to EtOH abuse and his creatinine on admission was 2.9, now it is 2.7. I do not have any old records. Ultrasound was done for the patient, which showed right kidney 9.4 cm, left kidney 9. 3 cm with one 2.5 cm right renal cyst and no hydronephrosis. His UA was not done. His echocardiogram showed diastolic dysfunction with an EF of 25-30%. On x-ray, he had pneumonia. PAST MEDICAL HISTORY:  He said he has been fairly healthy, but does not see a physician on a regular basis. MEDICATIONS:  As an inpatient includes:  1. Nebs. 2.  Zithromax. 3.  Coreg. 4.  Rocephin. 5.  Lovenox. 6.  Famotidine. 7.  Neurontin. 8.  Thiamine. 9.  Prednisone. ALLERGIES:  NO KNOWN ALLERGY. SOCIAL HISTORY:  Reviewed. FAMILY HISTORY:  Reviewed. REVIEW OF SYSTEMS:  Look at the HPI, rest is negative. PHYSICAL EXAMINATION:  VITAL SIGNS:  Blood pressure 132/91 and saturating 95%. NECK:  JVD is negative. LUNGS:  Decreased breath sounds in the bases. EXTREMITIES:  No edema. LABORATORY DATA:  Are as follows. Sodium 134, potassium 4.4, BUN 21, creatinine 2.7, sugar is 261. Globulin 3.8, albumin 3.2, potassium 8.7. Hemoglobin 13.5, platelets 756 and WBC is 6.9. IMPRESSION:  1.   Chronic kidney disease, likely stage IV, never saw a physician a on regular basis, but we will need to send some basic workup to note smaller kidneys. 2.  Cardiomyopathy with an ejection fraction of 25%. 3.  Hypertension. 4.  High blood sugar, likely diabetic. 5.  Recent weight loss. 6.  Pneumonia. RECOMMENDATIONS:  As follows:  1. Get urine studies. 2.  SPEP and free light chain. 3.  Cardiology might be doing cardiac catheterization. If limited amount of values, the risk of complex nephropathy will be lower. The patient needs to see us on a regular basis, see Dr. Carleen Borges.       Kristal Menard MD      [email or web address]  D:  02/12/2020 16:21  T:  02/12/2020 19:28  JOB #:  [id number]

## 2020-02-13 NOTE — PROGRESS NOTES
2/13/2020 -   CARMELA:  RUR: 12%  - Patient has declined cardiac cath as of 2/13  - CM met with patient to discuss homeless shelter placement; patient did not recall meeting previous CM  - CM explained the [facility], wrote phone number [phone]) on patient's white board, and provided instructions to call ASAP for initial intake process; patient expressed understanding  - Patient declined scheduling assistance for new PCP    14:20 -   CM met with patient to discuss discharge disposition. Patient does not recall meeting with EDSON in the ED. Patient stated that he has been homeless for 4-5 years and has been transient from [place], [place], [place], and [facility]. Patient stated that he does have family in the [facility] but would not be able to stay with them upon discharge due to them currently, \"Going through some changes as well. \"  CM discussed the [address] and identified that patient will need to contact the hotline for initial intake process in order to initiate potential placement within a shelter. Patient expressed understanding, and CM wrote the phone number on patient's white board. CM discussed need for a new PCP, and patient declined scheduling assistance, identifying that he does not know what ins will cover. CM reassured patient that scheduling team can work with ins coverage, but patient again declined.   CRM: Ambrocio Dalton, MPH, [address]; Z: [phone]

## 2020-02-13 NOTE — PROGRESS NOTES
Bedside and Verbal shift change report given to [employer], RN (oncoming nurse) by Gonzalez Gonzalez RN (offgoing nurse). Report included the following information SBAR, Kardex, Intake/Output, MAR and Recent Results.

## 2020-02-13 NOTE — PROGRESS NOTES
[facility] Adult  Hospitalist Group                                                                                          Hospitalist Progress Note  Mary Mae MD  Answering service: [phone] from in house phone        Date of Service:  2020  NAME:  Lamont Marx  :  1960  MRN:  [id number]      Admission Summary: This 71-year-old gentleman came to the emergency room via EMS complaining of left hip pain and burning for a few weeks. When EMS arrived, patient's heart rate was sustained in the 180s. Interval history / Subjective: Follow up Cardiomyopathy, CHF, PNA  Patient seen and examined by the bedside, Labs, images and notes reviewed  Reports burning pain in lt leg for 4 weeks  Discussed with nursing staff, orders reviewed. Plan discussed with patient/Family       Assessment & Plan:   Right perihilar pneumonia, CAP  Acute wheezy bronchitis?undiagnosed copd  Ceftriaxone and azithromycin  Schedule bronchodilators,ICS and prednisone. ABG  Blood culture no growth thus far    Acute hypoxic resp failure(tachypnea, hypoxia),POA, due to CAP  -ABG w/o CO2 retention  -wean oxygen for spo2>90%    Severe cardiomyopathy,Newly diagnosed systolic heart failure compensated  -Echocardiogram reported EF of 25-30%  -Needs cardiac evaluation,follow up,thus will go ahead and ask cardiology to see. -Start coreg. NO ace-I/arb due to ckd  -Cardiac cath am,     Supraventricular tachycardia  TSH wnl   UDS :Negative   Received adenocard. HR low 100. Start coreg      CKD IV.patient says he was told of \"kidney not working properly,\"3-4 years ago,but did not have follow up sience  Avoid nephrotoxic drugs, renally dose meds  He needs follow up with nephrologist and pcp.       Prediabetes. A1C 6.counseled on healthy eating,carb limitation   Left LE shooting pain,sciatica  -Exam is benign without swelling, deformity.   Range of motion is intact  -Try gabapentin -PT/OT       Homelessness  Case management consult     Patient's Baseline: Ambulates with walking  DVT ppx: Lovenox  Code status: Full  Disposition: TBD. Currently homeless. Care plan discussed with patient/family and nurse.  [facility] Problems  Never Reviewed          Codes Class Noted POA    SVT (supraventricular tachycardia) ([facility] [address] ICD-10-CM: I47.1  ICD-9-CM: 427.89  2/10/2020 Unknown        Pneumonia ICD-10-CM: J18.9  ICD-9-CM: 523  2/10/2020 Unknown                Review of Systems:   A comprehensive review of systems was negative except for that written in the HPI. Vital Signs:    Last 24hrs VS reviewed since prior progress note. Most recent are:  Visit Vitals  /84 (BP 1 Location: Right arm, BP Patient Position: Sitting)   Pulse (!) 102   Temp 97.6 °F (36.4 °C)   Resp 18   Ht 5' 9\" (1.753 m)   Wt 79.5 kg (175 lb 4.3 oz)   SpO2 93%   BMI 25.88 kg/m²         Intake/Output Summary (Last 24 hours) at 2/13/2020 3171  Last data filed at 2/12/2020 2302  Gross per 24 hour   Intake 100 ml   Output    Net 100 ml        Physical Examination:             Constitutional:  No acute distress, cooperative, pleasant    ENT:  Oral mucosa moist, oropharynx benign. Resp:  Improved wheezing. CV:  Regular rhythm, normal rate, no murmurs, gallops, rubs    GI:  Soft, non distended, non tender. normoactive bowel sounds, no hepatosplenomegaly     Musculoskeletal:  No swelling, deformity or edema on the left lower extremity. Range of motion is intact. Neurologic:  Moves all extremities. AAOx3, CN II-XII reviewed     Psych:  Good insight, Not anxious nor agitated.        Data Review:    Review and/or order of clinical lab test  Review and/or order of tests in the radiology section of CPT  Review and/or order of tests in the medicine section of CPT      Labs:     Recent Labs     02/11/20  0307 02/10/20  2212   WBC 6.9 6.4   HGB 13.5 15.7   HCT 41.2 47.4   * 141*     Recent Labs     02/13/20  0310 02/12/20  0237 02/11/20  0307 02/10/20  2212   * 134* 140 139   K 4.4 4.4 4.4 4.4    105 113* 107   CO2 22 21 20* 25   BUN 24* 21* 22* 25*   CREA 2.41* 2.74* 2.41* 2.91*   * 261* 115* 217*   CA 9.1 8.7 8.4* 9.5   MG  --   --  1.7 1.8     Recent Labs     02/10/20  2212   SGOT 32   ALT 34      TBILI 0.4   TP 7.0   ALB 3.2*   GLOB 3.8     No results for input(s): INR, PTP, APTT, INREXT, INREXT in the last 72 hours. No results for input(s): FE, TIBC, PSAT, FERR in the last 72 hours. No results found for: FOL, RBCF   No results for input(s): PH, PCO2, PO2 in the last 72 hours.   Recent Labs     02/11/20  0307 02/10/20  2212   TROIQ 0.06* <0.05     No results found for: CHOL, CHOLX, CHLST, CHOLV, HDL, HDLP, LDL, LDLC, DLDLP, TGLX, TRIGL, TRIGP, CHHD, CHHDX  Lab Results   Component Value Date/Time    Glucose (POC) 117 (H) 02/13/2020 06:29 AM    Glucose (POC) 114 (H) 02/12/2020 10:07 PM    Glucose (POC) 147 (H) 02/12/2020 04:19 PM    Glucose (POC) 219 (H) 02/12/2020 11:05 AM    Glucose (POC) 123 (H) 02/12/2020 06:47 AM     Lab Results   Component Value Date/Time    Color YELLOW/STRAW 02/12/2020 07:02 PM    Appearance CLEAR 02/12/2020 07:02 PM    Specific gravity 1.008 02/12/2020 07:02 PM    pH (UA) 5.0 02/12/2020 07:02 PM    Protein 30 (A) 02/12/2020 07:02 PM    Glucose NEGATIVE  02/12/2020 07:02 PM    Ketone NEGATIVE  02/12/2020 07:02 PM    Bilirubin NEGATIVE  02/12/2020 07:02 PM    Urobilinogen 0.2 02/12/2020 07:02 PM    Nitrites NEGATIVE  02/12/2020 07:02 PM    Leukocyte Esterase NEGATIVE  02/12/2020 07:02 PM    Epithelial cells FEW 02/12/2020 07:02 PM    Bacteria NEGATIVE  02/12/2020 07:02 PM    WBC 0-4 02/12/2020 07:02 PM    RBC 0-5 02/12/2020 07:02 PM         Medications Reviewed:     Current Facility-Administered Medications   Medication Dose Route Frequency    famotidine (PEPCID) tablet 20 mg  20 mg Oral QHS    sodium chloride (NS) flush 5-40 mL  5-40 mL IntraVENous Q8H    sodium chloride (NS) flush 5-40 mL  5-40 mL IntraVENous PRN    acetaminophen (TYLENOL) tablet 650 mg  650 mg Oral Q4H PRN    morphine injection 1 mg  1 mg IntraVENous Q4H PRN    ondansetron (ZOFRAN) injection 4 mg  4 mg IntraVENous Q4H PRN    insulin regular (NOVOLIN R, HUMULIN R) injection   SubCUTAneous AC&HS    glucose chewable tablet 16 g  4 Tab Oral PRN    glucagon (GLUCAGEN) injection 1 mg  1 mg IntraMUSCular PRN    dextrose 10% infusion 0-250 mL  0-250 mL IntraVENous PRN    albuterol-ipratropium (DUO-NEB) 2.5 MG-0.5 MG/3 ML  3 mL Nebulization Q4H PRN    gabapentin (NEURONTIN) capsule 100 mg  100 mg Oral TID    enoxaparin (LOVENOX) injection 40 mg  40 mg SubCUTAneous DAILY    albuterol-ipratropium (DUO-NEB) 2.5 MG-0.5 MG/3 ML  3 mL Nebulization Q6H RT    predniSONE (DELTASONE) tablet 40 mg  40 mg Oral DAILY WITH BREAKFAST    budesonide (PULMICORT) 500 mcg/2 ml nebulizer suspension  500 mcg Nebulization BID RT    carvediloL (COREG) tablet 12.5 mg  12.5 mg Oral BID WITH MEALS    benzonatate (TESSALON) capsule 100 mg  100 mg Oral TID PRN    LORazepam (ATIVAN) tablet 2 mg  2 mg Oral Q1H PRN    LORazepam (ATIVAN) tablet 4 mg  4 mg Oral G4U PRN    folic acid (FOLVITE) tablet 1 mg  1 mg Oral DAILY    thiamine HCL (B-1) tablet 100 mg  100 mg Oral DAILY    cefTRIAXone (ROCEPHIN) 1 g in 0.9% sodium chloride (MBP/ADV) 50 mL  1 g IntraVENous Q24H    azithromycin (ZITHROMAX) 500 mg in 0.9% sodium chloride (MBP/ADV) 250 mL  500 mg IntraVENous Q24H     ______________________________________________________________________  EXPECTED LENGTH OF STAY: 3d 7h  ACTUAL LENGTH OF STAY:          3                 Reji Virk MD

## 2020-02-13 NOTE — PROGRESS NOTES
Cardiology Progress Note                                        Admit Date: 2/10/2020    Assessment/Plan:     CHF; acute systolic; slowly improving; continue medical therapy  Cardiomyopathy; concerned that he has CAD but he refuses to have a cardiac cath evaluation this am  CKD; stable    Dina Kelly is a 64 y.o. male with     PROBLEM LIST:  Patient Active Problem List    Diagnosis Date Noted    SVT (supraventricular tachycardia) ([address] 02/10/2020    Pneumonia 02/10/2020         Subjective: Dina Kelly reports none.     Visit Vitals  /73 (BP Patient Position: Supine)   Pulse 95   Temp 98 °F (36.7 °C)   Resp 18   Ht 5' 9\" (1.753 m)   Wt 175 lb 4.3 oz (79.5 kg)   SpO2 93%   BMI 25.88 kg/m²       Intake/Output Summary (Last 24 hours) at 2/13/2020 0630  Last data filed at 2/12/2020 2302  Gross per 24 hour   Intake 100 ml   Output    Net 100 ml       Objective:      Physical Exam:  HEENT: Perrla, EOMI  Neck: No JVD,  No thyroidmegaly  Resp: rales  CV: RRR s1s2 No murmur, + s3  Abd:Soft, Nontender  Ext: No edema  Neuro: Alert and oriented; Nonfocal  Skin: Warm, Dry, Intact  Pulses: 2+ DP/PT/Rad      Telemetry: normal sinus rhythm    Current Facility-Administered Medications   Medication Dose Route Frequency    famotidine (PEPCID) tablet 20 mg  20 mg Oral QHS    sodium chloride (NS) flush 5-40 mL  5-40 mL IntraVENous Q8H    sodium chloride (NS) flush 5-40 mL  5-40 mL IntraVENous PRN    acetaminophen (TYLENOL) tablet 650 mg  650 mg Oral Q4H PRN    morphine injection 1 mg  1 mg IntraVENous Q4H PRN    ondansetron (ZOFRAN) injection 4 mg  4 mg IntraVENous Q4H PRN    insulin regular (NOVOLIN R, HUMULIN R) injection   SubCUTAneous AC&HS    glucose chewable tablet 16 g  4 Tab Oral PRN    glucagon (GLUCAGEN) injection 1 mg  1 mg IntraMUSCular PRN    dextrose 10% infusion 0-250 mL  0-250 mL IntraVENous PRN    albuterol-ipratropium (DUO-NEB) 2.5 MG-0.5 MG/3 ML  3 mL Nebulization Q4H PRN    gabapentin (NEURONTIN) capsule 100 mg  100 mg Oral TID    enoxaparin (LOVENOX) injection 40 mg  40 mg SubCUTAneous DAILY    albuterol-ipratropium (DUO-NEB) 2.5 MG-0.5 MG/3 ML  3 mL Nebulization Q6H RT    predniSONE (DELTASONE) tablet 40 mg  40 mg Oral DAILY WITH BREAKFAST    budesonide (PULMICORT) 500 mcg/2 ml nebulizer suspension  500 mcg Nebulization BID RT    carvediloL (COREG) tablet 12.5 mg  12.5 mg Oral BID WITH MEALS    benzonatate (TESSALON) capsule 100 mg  100 mg Oral TID PRN    LORazepam (ATIVAN) tablet 2 mg  2 mg Oral Q1H PRN    LORazepam (ATIVAN) tablet 4 mg  4 mg Oral V1H PRN    folic acid (FOLVITE) tablet 1 mg  1 mg Oral DAILY    thiamine HCL (B-1) tablet 100 mg  100 mg Oral DAILY    cefTRIAXone (ROCEPHIN) 1 g in 0.9% sodium chloride (MBP/ADV) 50 mL  1 g IntraVENous Q24H    azithromycin (ZITHROMAX) 500 mg in 0.9% sodium chloride (MBP/ADV) 250 mL  500 mg IntraVENous Q24H         Data Review:   Labs:    Recent Results (from the past 24 hour(s))   GLUCOSE, POC    Collection Time: 02/12/20  6:47 AM   Result Value Ref Range    Glucose (POC) 123 (H) 65 - 100 mg/dL    Performed by Juan Rowe    GLUCOSE, POC    Collection Time: 02/12/20 11:05 AM   Result Value Ref Range    Glucose (POC) 219 (H) 65 - 100 mg/dL    Performed by Elisa Caal    GLUCOSE, POC    Collection Time: 02/12/20  4:19 PM   Result Value Ref Range    Glucose (POC) 147 (H) 65 - 100 mg/dL    Performed by Felton Cali    URINALYSIS W/ RFLX MICROSCOPIC    Collection Time: 02/12/20  7:02 PM   Result Value Ref Range    Color YELLOW/STRAW      Appearance CLEAR CLEAR      Specific gravity 1.008 1.003 - 1.030      pH (UA) 5.0 5.0 - 8.0      Protein 30 (A) NEG mg/dL    Glucose NEGATIVE  NEG mg/dL    Ketone NEGATIVE  NEG mg/dL    Bilirubin NEGATIVE  NEG      Blood NEGATIVE  NEG      Urobilinogen 0.2 0.2 - 1.0 EU/dL    Nitrites NEGATIVE  NEG      Leukocyte Esterase NEGATIVE  NEG      WBC 0-4 0 - 4 /hpf    RBC 0-5 0 - 5 /hpf    Epithelial cells FEW FEW /lpf    Bacteria NEGATIVE  NEG /hpf    Hyaline cast 0-2 0 - 5 /lpf   PROTEIN/CREATININE RATIO, URINE    Collection Time: 02/12/20  7:02 PM   Result Value Ref Range    Protein, urine random 35 (H) 0.0 - 11.9 mg/dL    Creatinine, urine 60.60 mg/dL    Protein/Creat.  urine Ratio 0.6     GLUCOSE, POC    Collection Time: 02/12/20 10:07 PM   Result Value Ref Range    Glucose (POC) 114 (H) 65 - 100 mg/dL    Performed by Rowdy Harry (JERAMY)    METABOLIC PANEL, BASIC    Collection Time: 02/13/20  3:10 AM   Result Value Ref Range    Sodium 134 (L) 136 - 145 mmol/L    Potassium 4.4 3.5 - 5.1 mmol/L    Chloride 105 97 - 108 mmol/L    CO2 22 21 - 32 mmol/L    Anion gap 7 5 - 15 mmol/L    Glucose 168 (H) 65 - 100 mg/dL    BUN 24 (H) 6 - 20 MG/DL    Creatinine 2.41 (H) 0.70 - 1.30 MG/DL    BUN/Creatinine ratio 10 (L) 12 - 20      GFR est AA 34 (L) >60 ml/min/1.73m2    GFR est non-AA 28 (L) >60 ml/min/1.73m2    Calcium 9.1 8.5 - 10.1 MG/DL

## 2020-02-13 NOTE — PROGRESS NOTES
RENAL  PROGRESS NOTE        Subjective:    refused [facility]  Objective:   VITALS SIGNS:    Visit Vitals  /73 (BP Patient Position: Supine)   Pulse 95   Temp 98 °F (36.7 °C)   Resp 18   Ht 5' 9\" (1.753 m)   Wt 79.5 kg (175 lb 4.3 oz)   SpO2 93%   BMI 25.88 kg/m²       O2 Device: Nasal cannula   O2 Flow Rate (L/min): 3 l/min   Temp (24hrs), Av.1 °F (36.7 °C), Min:98 °F (36.7 °C), Max:98.2 °F (36.8 °C)         PHY  NAD  DATA REVIEW:     INTAKE / OUTPUT:   Last shift:      No intake/output data recorded. Last 3 shifts:  1901 -  0700  In: 100 [I.V.:100]  Out: -     Intake/Output Summary (Last 24 hours) at 2020 0837  Last data filed at 2020 2302  Gross per 24 hour   Intake 100 ml   Output    Net 100 ml         LABS:   Recent Labs     20  0307 02/10/20  2212   WBC 6.9 6.4   HGB 13.5 15.7   HCT 41.2 47.4   * 141*     Recent Labs     20  0310 20  0237 20  0307 02/10/20  2212   * 134* 140 139   K 4.4 4.4 4.4 4.4    105 113* 107   CO2 22 21 20* 25   * 261* 115* 217*   BUN 24* 21* 22* 25*   CREA 2.41* 2.74* 2.41* 2.91*   CA 9.1 8.7 8.4* 9.5   MG  --   --  1.7 1.8   ALB  --   --   --  3.2*   TBILI  --   --   --  0.4   SGOT  --   --   --  32   ALT  --   --   --  34           Assessment:   CKD ,? Baseline ,likely stage 4,proteinuria 0.6 grams  No medical follow up  HTN,likely DM  cardiomyopahty EF 25 %  PNA    Plan:   Refusing cardiac cath  SPEP. ..pending  Renal function stable  Chastity Bashir MD

## 2020-02-13 NOTE — CARDIO/PULMONARY
Cardiac Rehab: Living wit HF education book given to [employer]. Patient is homeless. Teach back used for education. When asked, patient denied knowledge of \"weak heart\" and \"SVT\" which was written on the whiteboard. When I brought his attention to the whiteboard he stated, \"Oh, that's right. They said my heart might be weak but they have to do that test to be sure to be sure. \" He was scheduled for a cardiac cath this morning and he is currently declining to have it done \"because of the burning in my leg. I won't be able to stay still. \". Reviewed the cardiac cath procedure and benefits. Also discussed the potential complications if he does not have the cardiac cath. He is aware of these points stating \"The doctor told me all of that. \". Strongly encouraged him to reconsider. Discussed \"weak heart\". He can not weigh daily as he is homeless therefore discussed the other signs and symptoms of fluid retention that might occur and when to call the physician. Discussed the benefits of limiting sodium/salt intake and foods high in sodium. Discussed the benefits of the cardiac cath in relation to the weakened heart. Patient verbalized understanding and appreciation however he continues to decline the cardiac cath. He is unable to attend cardiac rehab due to his financial status.   Wyona Siemens, RN

## 2020-02-13 NOTE — CDMP QUERY
Patient admitted with Community acquired PNA and AHRF. Noted documentation of \" CHF; acute systolic\" by cardio on 2/99 and \"Severe cardiomyopathy,no clinical CHF\" by the attending on 2/12/20. If possible, please document in progress notes and d/c summary if you are evaluating and /or treating any of the following: 
 
=> Acute  Systolic CHF on admission Ruled out, severe cardiomyopathy only  
=> Acute systolic CHF POA confirmed  
=> Other explanation  
=> Unable to determine The medical record reflects the following: 
  Risk Factors: 63 yo M admitted with CAP and AHRF Clinical Indicators: 2/10 CXR: shows PNA  ProBNP 2809  Echo EF 25-30% LVD 
2/12 PN \"Severe cardiomyopathy,no clinical CHF 
-Echocardiogram reported EF of 25-30%\"  No edema Cardio 2/12 PN states \" CHF; acute systolic\" Treatment: IV Lasix 40 mg x 1, PO Coreg 12.5 mg Thank you for your time [facility] RN/BSN, CCDS Desk:   [phone] Other:  [phone]

## 2020-02-13 NOTE — PROGRESS NOTES
Problem: Mobility Impaired (Adult and Pediatric)  Goal: *Acute Goals and Plan of Care (Insert Text)  Description  FUNCTIONAL STATUS PRIOR TO ADMISSION: Patient was independent and active without use of DME.    HOME SUPPORT PRIOR TO ADMISSION: The patient is homeless with no local support. Physical Therapy Goals  Initiated 2/13/2020  1. Patient will ambulate with independence for 300 feet with no device with SpO2 >/= 90% within 7 day(s). 2.  Patient will demonstrate independence with his home program for his LLE pain within 7 days     Outcome: Not Met     PHYSICAL THERAPY EVALUATION  Patient: Akash Walker (13 y.o. male)  Date: 2/13/2020  Primary Diagnosis: Pneumonia [J18.9]  SVT (supraventricular tachycardia) ([facility] [address] [I47.1]  Procedure(s) (LRB):  LEFT AND RIGHT HEART CATH / CORONARY ANGIOGRAPHY (N/A)     Precautions:          ASSESSMENT  Based on the objective data described below, the patient presents with decreased activity tolerance, LLE pain, increased SOB and SpO2 <90% with exertion while on ROM. He was instructed in and performed LLE stretches (piriformis) during this visit. Recommended he continue with gentle stretches intermittently throughout the day. Patient tolerated 150 ft ambulation with increased SOB and SpO2 decreasing to 85% while on room air. Gait w/o a device is steady. Strength and ROM are functional.      Therapy will continue to follow for LLE pain and gait training with emphasis placed on breathing strategies, pacing self and recognizing s/sx indicating the need to rest.  Anticipate no f/u needs at discharge. Will continue to monitor for needs and update the plan of care as indicated. Current Level of Function Impacting Discharge (mobility/balance): Supervision to ambulate; SpO2 85% walking while on room air     Functional Outcome Measure: The patient scored 27/28 on the Tinetti outcome measure which is indicative of low fall risk.       Other factors to consider for discharge: Homeless     Patient will benefit from skilled therapy intervention to address the above noted impairments. PLAN :  Recommendations and Planned Interventions: gait training, therapeutic exercises, patient and family training/education and therapeutic activities      Frequency/Duration: Patient will be followed by physical therapy:  5 times a week to address goals. Recommendation for discharge: (in order for the patient to meet his/her long term goals)  Anticipate skilled physical therapy/ follow up rehabilitation needs. Will continue to monitor for needs and update the POC as indicated. This discharge recommendation:  Has not yet been discussed the attending provider and/or case management    IF patient discharges home will need the following DME: none         SUBJECTIVE:   Patient stated I did therapy for my back. It made it worse so I stopped therapy. \"        OBJECTIVE DATA SUMMARY:   HISTORY:    Past Medical History:   Diagnosis Date    Hypotension     Vertigo    History reviewed. No pertinent surgical history. Home Situation  Home Environment: Other (comment)(homeless)  One/Two Story Residence: Other (Comment)  Living Alone: Yes  Support Systems: None  Patient Expects to be Discharged to[de-identified] Unknown  Current DME Used/Available at Home: None    EXAMINATION/PRESENTATION/DECISION MAKING:   Critical Behavior:  Neurologic State: Alert  Orientation Level: Oriented to person, Oriented to place, Oriented to situation, Oriented to time  Cognition: Appropriate decision making, Appropriate for age attention/concentration, Appropriate safety awareness, Follows commands  Safety/Judgement: Awareness of environment  Hearing: Auditory  Auditory Impairment: None    Range Of Motion:  AROM: Within functional limits                       Strength:    Strength:  Within functional limits                    Tone & Sensation:   Tone: Normal              Sensation: Impaired(intermittent LLE burning sensation )               Coordination:  Coordination: Within functional limits    Functional Mobility:  Bed Mobility:  Rolling: Independent  Supine to Sit: Independent  Sit to Supine: Independent  Scooting: Independent  Transfers:  Sit to Stand: Independent  Stand to Sit: Independent                       Balance:   Sitting: Intact; Without support  Standing: Intact; Without support  Ambulation/Gait Training:  Distance (ft): 150 Feet (ft)     Ambulation - Level of Assistance: Supervision(only to monitor SpO2 and verbal cues)     Gait Description (WDL): Within defined limits                             Interventions: Safety awareness training;Verbal cues           Therapeutic Exercises:   Lengthy discussion and education re: LLE pain. Instructed in piriformis stretch in supine and sitting. Effort to perform in supine appeared taxing on patient as he becomes SOB therefore switched to a seated stretch. Stressed importance of \"gentle\" stretches and sustained holds versus forcing ROM and bouncing and importance of maintain upright back posture bending forward at the hip. Educated when to back off or stop stretches. Patient performed both with stretch felt in the area though with onset of burning sensation. Discussed benefit of cold pack during times of increased pain. Of note, patient recently started Gabapentin. Reports some improvement in the burning sensation in his left calf with this though persists at times and with no change as of yet in the buttock or posterior thigh. Onset of \"burning\" sensation with stretches today resolved in about one to two minutes.     Functional Measure:  Tinetti test:    Sitting Balance: 1  Arises: 1  Attempts to Rise: 2  Immediate Standing Balance: 2  Standing Balance: 2  Nudged: 2  Eyes Closed: 1  Turn 360 Degrees - Continuous/Discontinuous: 1  Turn 360 Degrees - Steady/Unsteady: 1  Sitting Down: 2  Balance Score: 15 Balance total score  Indication of Gait: 1  R Step Length/Height: 1  L Step Length/Height: 1  R Foot Clearance: 1  L Foot Clearance: 1  Step Symmetry: 1  Step Continuity: 1  Path: 2  Trunk: 2  Walking Time: 1  Gait Score: 12 Gait total score  Total Score: 27/28 Overall total score         Tinetti Tool Score Risk of Falls  <19 = High Fall Risk  19-24 = Moderate Fall Risk  25-28 = Low Fall Risk  Andi SOSA. Performance-Oriented Assessment of Mobility Problems in Elderly Patients. [facility] 66; [id number]. (Scoring Description: PT Bulletin Feb. 10, 1993)    Older adults: El Hyman et al, 2009; n = 1000 [facility] elderly evaluated with ABC, JULIETTE, ADL, and IADL)  · Mean JULIETTE score for males aged 69-68 years = 26.21(3.40)  · Mean JULIETTE score for females age 69-68 years = 25.16(4.30)  · Mean JULIETTE score for males over 80 years = 23.29(6.02)  · Mean JULIETTE score for females over 80 years = 17.20(8.32)            Physical Therapy Evaluation Charge Determination   History Examination Presentation Decision-Making   MEDIUM  Complexity : 1-2 comorbidities / personal factors will impact the outcome/ POC  MEDIUM Complexity : 3 Standardized tests and measures addressing body structure, function, activity limitation and / or participation in recreation  MEDIUM Complexity : Evolving with changing characteristics  MEDIUM Complexity : FOTO score of 26-74      Based on the above components, the patient evaluation is determined to be of the following complexity level: MEDIUM    Pain Rating:  Voiced burning pain posterior LLE    Activity Tolerance:   Fair and desaturates with exertion while on room air  Please refer to the flowsheet for vital signs taken during this treatment. Vitals:    02/13/20 0901 02/13/20 0934 02/13/20 0940 02/13/20 0942   BP:  127/88 141/84    BP 1 Location:  Left arm Left arm    BP Patient Position:  Sitting; At rest Standing;During activity (walking) Sitting;  At rest   Pulse:  78 (!) 119 87   SpO2:  93% (!) 87% 92%         After treatment patient left in no apparent distress:   Sitting in chair, Call bell within reach and Nursing made aware of desat w/ activity    COMMUNICATION/EDUCATION:   The patients plan of care was discussed with: Registered Nurse. Fall prevention education was provided and the patient/caregiver indicated understanding., Patient/family have participated as able in goal setting and plan of care. and Patient/family agree to work toward stated goals and plan of care.     Thank you for this referral.  Tu Shown, PT   Time Calculation: 39 mins

## 2020-02-13 NOTE — PROGRESS NOTES
Orders received, chart reviewed and patient evaluated by physical therapy. Pending progression with skilled acute physical therapy, recommend:  Anticipate no skilled physical therapy/ follow up rehabilitation needs. SpO2 decreased to 87% on room air while walking in the dodd. Patient SOB, able to complete sentences. Voiced no light headedness though notes being light headed at times and having to stop walking and sit when in the community. Report provided to [facility] re: SpO2 with activity. Vitals:    02/13/20 0901 02/13/20 0934 02/13/20 0940 02/13/20 0942   BP:  127/88 141/84    BP 1 Location:  Left arm Left arm    BP Patient Position:  Sitting; At rest Standing;During activity (walking) Sitting; At rest   Pulse:  78 (!) 119 87   SpO2:  93% (!) 87% 92%       Full evaluation to follow.

## 2020-02-14 PROBLEM — I50.21 ACUTE SYSTOLIC HEART FAILURE (HCC): Status: ACTIVE | Noted: 2020-02-14

## 2020-02-14 LAB
ANION GAP SERPL CALC-SCNC: 6 MMOL/L (ref 5–15)
BASOPHILS # BLD: 0 K/UL (ref 0–0.1)
BASOPHILS NFR BLD: 0 % (ref 0–1)
BUN SERPL-MCNC: 28 MG/DL (ref 6–20)
BUN/CREAT SERPL: 11 (ref 12–20)
CALCIUM SERPL-MCNC: 9.2 MG/DL (ref 8.5–10.1)
CHLORIDE SERPL-SCNC: 104 MMOL/L (ref 97–108)
CO2 SERPL-SCNC: 25 MMOL/L (ref 21–32)
CREAT SERPL-MCNC: 2.47 MG/DL (ref 0.7–1.3)
DIFFERENTIAL METHOD BLD: ABNORMAL
EOSINOPHIL # BLD: 0 K/UL (ref 0–0.4)
EOSINOPHIL NFR BLD: 0 % (ref 0–7)
ERYTHROCYTE [DISTWIDTH] IN BLOOD BY AUTOMATED COUNT: 13.9 % (ref 11.5–14.5)
GLUCOSE BLD STRIP.AUTO-MCNC: 117 MG/DL (ref 65–100)
GLUCOSE BLD STRIP.AUTO-MCNC: 128 MG/DL (ref 65–100)
GLUCOSE BLD STRIP.AUTO-MCNC: 171 MG/DL (ref 65–100)
GLUCOSE BLD STRIP.AUTO-MCNC: 99 MG/DL (ref 65–100)
GLUCOSE SERPL-MCNC: 180 MG/DL (ref 65–100)
HCT VFR BLD AUTO: 41.8 % (ref 36.6–50.3)
HEMOCCULT STL QL: POSITIVE
HGB BLD-MCNC: 13.9 G/DL (ref 12.1–17)
IGA SERPL-MCNC: 130 MG/DL (ref 90–386)
IGG SERPL-MCNC: 1152 MG/DL (ref 700–1600)
IGM SERPL-MCNC: 123 MG/DL (ref 20–172)
IMM GRANULOCYTES # BLD AUTO: 0 K/UL (ref 0–0.04)
IMM GRANULOCYTES NFR BLD AUTO: 0 % (ref 0–0.5)
KAPPA LC FREE SER-MCNC: 25.3 MG/L (ref 3.3–19.4)
KAPPA LC FREE/LAMBDA FREE SER: 0.75 {RATIO} (ref 0.26–1.65)
LAMBDA LC FREE SERPL-MCNC: 33.6 MG/L (ref 5.7–26.3)
LYMPHOCYTES # BLD: 0.7 K/UL (ref 0.8–3.5)
LYMPHOCYTES NFR BLD: 6 % (ref 12–49)
MCH RBC QN AUTO: 34.5 PG (ref 26–34)
MCHC RBC AUTO-ENTMCNC: 33.3 G/DL (ref 30–36.5)
MCV RBC AUTO: 103.7 FL (ref 80–99)
MONOCYTES # BLD: 0.7 K/UL (ref 0–1)
MONOCYTES NFR BLD: 6 % (ref 5–13)
NEUTS SEG # BLD: 10.5 K/UL (ref 1.8–8)
NEUTS SEG NFR BLD: 88 % (ref 32–75)
NRBC # BLD: 0 K/UL (ref 0–0.01)
NRBC BLD-RTO: 0 PER 100 WBC
PLATELET # BLD AUTO: 167 K/UL (ref 150–400)
PMV BLD AUTO: 11.2 FL (ref 8.9–12.9)
POTASSIUM SERPL-SCNC: 4.3 MMOL/L (ref 3.5–5.1)
PROT PATTERN SERPL IFE-IMP: NORMAL
RBC # BLD AUTO: 4.03 M/UL (ref 4.1–5.7)
RBC MORPH BLD: ABNORMAL
RBC MORPH BLD: ABNORMAL
SERVICE CMNT-IMP: ABNORMAL
SERVICE CMNT-IMP: NORMAL
SODIUM SERPL-SCNC: 135 MMOL/L (ref 136–145)
WBC # BLD AUTO: 11.9 K/UL (ref 4.1–11.1)

## 2020-02-14 PROCEDURE — 82962 GLUCOSE BLOOD TEST: CPT

## 2020-02-14 PROCEDURE — 74011000250 HC RX REV CODE- 250: Performed by: INTERNAL MEDICINE

## 2020-02-14 PROCEDURE — 74011250636 HC RX REV CODE- 250/636: Performed by: STUDENT IN AN ORGANIZED HEALTH CARE EDUCATION/TRAINING PROGRAM

## 2020-02-14 PROCEDURE — 65660000000 HC RM CCU STEPDOWN

## 2020-02-14 PROCEDURE — 74011250637 HC RX REV CODE- 250/637: Performed by: STUDENT IN AN ORGANIZED HEALTH CARE EDUCATION/TRAINING PROGRAM

## 2020-02-14 PROCEDURE — 82272 OCCULT BLD FECES 1-3 TESTS: CPT

## 2020-02-14 PROCEDURE — 80048 BASIC METABOLIC PNL TOTAL CA: CPT

## 2020-02-14 PROCEDURE — 74011250636 HC RX REV CODE- 250/636: Performed by: HOSPITALIST

## 2020-02-14 PROCEDURE — 74011000258 HC RX REV CODE- 258: Performed by: STUDENT IN AN ORGANIZED HEALTH CARE EDUCATION/TRAINING PROGRAM

## 2020-02-14 PROCEDURE — 74011250637 HC RX REV CODE- 250/637: Performed by: HOSPITALIST

## 2020-02-14 PROCEDURE — 97116 GAIT TRAINING THERAPY: CPT

## 2020-02-14 PROCEDURE — 85025 COMPLETE CBC W/AUTO DIFF WBC: CPT

## 2020-02-14 PROCEDURE — 36415 COLL VENOUS BLD VENIPUNCTURE: CPT

## 2020-02-14 PROCEDURE — 97110 THERAPEUTIC EXERCISES: CPT

## 2020-02-14 PROCEDURE — 94760 N-INVAS EAR/PLS OXIMETRY 1: CPT

## 2020-02-14 PROCEDURE — 74011000250 HC RX REV CODE- 250: Performed by: HOSPITALIST

## 2020-02-14 PROCEDURE — 77010033678 HC OXYGEN DAILY

## 2020-02-14 PROCEDURE — 74011636637 HC RX REV CODE- 636/637: Performed by: STUDENT IN AN ORGANIZED HEALTH CARE EDUCATION/TRAINING PROGRAM

## 2020-02-14 PROCEDURE — 74011636637 HC RX REV CODE- 636/637: Performed by: HOSPITALIST

## 2020-02-14 PROCEDURE — 74011250637 HC RX REV CODE- 250/637: Performed by: INTERNAL MEDICINE

## 2020-02-14 PROCEDURE — 74011250637 HC RX REV CODE- 250/637: Performed by: NURSE PRACTITIONER

## 2020-02-14 PROCEDURE — 94640 AIRWAY INHALATION TREATMENT: CPT

## 2020-02-14 RX ORDER — IPRATROPIUM BROMIDE AND ALBUTEROL SULFATE 2.5; .5 MG/3ML; MG/3ML
3 SOLUTION RESPIRATORY (INHALATION)
Status: DISCONTINUED | OUTPATIENT
Start: 2020-02-14 | End: 2020-02-15 | Stop reason: HOSPADM

## 2020-02-14 RX ORDER — AMOXICILLIN AND CLAVULANATE POTASSIUM 875; 125 MG/1; MG/1
1 TABLET, FILM COATED ORAL 2 TIMES DAILY WITH MEALS
Status: DISCONTINUED | OUTPATIENT
Start: 2020-02-14 | End: 2020-02-15 | Stop reason: HOSPADM

## 2020-02-14 RX ORDER — CARVEDILOL 12.5 MG/1
25 TABLET ORAL
Status: COMPLETED | OUTPATIENT
Start: 2020-02-14 | End: 2020-02-14

## 2020-02-14 RX ORDER — CARVEDILOL 12.5 MG/1
25 TABLET ORAL 2 TIMES DAILY WITH MEALS
Status: DISCONTINUED | OUTPATIENT
Start: 2020-02-14 | End: 2020-02-15 | Stop reason: HOSPADM

## 2020-02-14 RX ADMIN — CEFTRIAXONE 1 G: 1 INJECTION, POWDER, FOR SOLUTION INTRAMUSCULAR; INTRAVENOUS at 00:14

## 2020-02-14 RX ADMIN — HUMAN INSULIN 2 UNITS: 100 INJECTION, SOLUTION SUBCUTANEOUS at 18:27

## 2020-02-14 RX ADMIN — CARVEDILOL 25 MG: 12.5 TABLET, FILM COATED ORAL at 16:05

## 2020-02-14 RX ADMIN — IPRATROPIUM BROMIDE AND ALBUTEROL SULFATE 3 ML: .5; 3 SOLUTION RESPIRATORY (INHALATION) at 21:38

## 2020-02-14 RX ADMIN — FOLIC ACID 1 MG: 1 TABLET ORAL at 09:19

## 2020-02-14 RX ADMIN — GABAPENTIN 100 MG: 100 CAPSULE ORAL at 09:19

## 2020-02-14 RX ADMIN — FAMOTIDINE 20 MG: 20 TABLET ORAL at 22:09

## 2020-02-14 RX ADMIN — GABAPENTIN 100 MG: 100 CAPSULE ORAL at 16:02

## 2020-02-14 RX ADMIN — ENOXAPARIN SODIUM 40 MG: 40 INJECTION SUBCUTANEOUS at 09:19

## 2020-02-14 RX ADMIN — Medication 10 ML: at 22:16

## 2020-02-14 RX ADMIN — PREDNISONE 40 MG: 20 TABLET ORAL at 09:19

## 2020-02-14 RX ADMIN — Medication 10 ML: at 07:26

## 2020-02-14 RX ADMIN — BUDESONIDE INHALATION 500 MCG: 0.5 SUSPENSION RESPIRATORY (INHALATION) at 07:36

## 2020-02-14 RX ADMIN — Medication 10 ML: at 16:02

## 2020-02-14 RX ADMIN — BENZONATATE 100 MG: 100 CAPSULE ORAL at 16:02

## 2020-02-14 RX ADMIN — GABAPENTIN 100 MG: 100 CAPSULE ORAL at 22:09

## 2020-02-14 RX ADMIN — IPRATROPIUM BROMIDE AND ALBUTEROL SULFATE 3 ML: .5; 3 SOLUTION RESPIRATORY (INHALATION) at 02:19

## 2020-02-14 RX ADMIN — BUDESONIDE INHALATION 500 MCG: 0.5 SUSPENSION RESPIRATORY (INHALATION) at 21:38

## 2020-02-14 RX ADMIN — MORPHINE SULFATE 1 MG: 2 INJECTION, SOLUTION INTRAMUSCULAR; INTRAVENOUS at 22:15

## 2020-02-14 RX ADMIN — IPRATROPIUM BROMIDE AND ALBUTEROL SULFATE 3 ML: .5; 3 SOLUTION RESPIRATORY (INHALATION) at 07:36

## 2020-02-14 RX ADMIN — Medication 100 MG: at 09:19

## 2020-02-14 RX ADMIN — AMOXICILLIN AND CLAVULANATE POTASSIUM 1 TABLET: 875; 125 TABLET, FILM COATED ORAL at 18:27

## 2020-02-14 RX ADMIN — AZITHROMYCIN MONOHYDRATE 500 MG: 500 INJECTION, POWDER, LYOPHILIZED, FOR SOLUTION INTRAVENOUS at 00:14

## 2020-02-14 RX ADMIN — CARVEDILOL 25 MG: 12.5 TABLET, FILM COATED ORAL at 11:28

## 2020-02-14 NOTE — PROGRESS NOTES
[facility] Adult  Hospitalist Group                                                                                          Hospitalist Progress Note  Diane Dodge MD  Answering service: [phone] from in house phone        Date of Service:  2020  NAME:  Alon Barber  :  1960  MRN:  [id number]      Admission Summary: This 42-year-old gentleman came to the emergency room via EMS complaining of left hip pain and burning for a few weeks. When EMS arrived, patient's heart rate was sustained in the 180s. Interval history / Subjective: Follow up Cardiomyopathy, CHF, PNA  Patient seen and examined by the bedside, Labs, images and notes reviewed  No complaints to offer, denies swelling in legs, chest pain, fever, abdominal pain  Discussed with nursing staff, orders reviewed. Plan discussed with patient/Family       Assessment & Plan:   Right perihilar pneumonia, CAP  Acute wheezy bronchitis?undiagnosed copd  DC Ceftriaxone and azithromycin  Start Augmentin, complete the course for 5 days  Schedule bronchodilators,ICS and prednisone. ABG  Blood culture no growth thus far    Acute hypoxic resp failure(tachypnea, hypoxia),POA, due to CAP  -ABG w/o CO2 retention  -wean oxygen for spo2>90%    Severe cardiomyopathy,Newly diagnosed systolic heart failure compensated  -Echocardiogram reported EF of 25-30%  -Needs cardiac evaluation,follow up,thus will go ahead and ask cardiology to see. -Start coreg. NO ace-I/arb due to ckd  -Cardiac cath am,, PT refused for the cath     Supraventricular tachycardia  TSH wnl   UDS :Negative   Received adenocard. HR low 100. Start coreg      CKD IV.patient says he was told of \"kidney not working properly,\"3-4 years ago,but did not have follow up sience  Avoid nephrotoxic drugs, renally dose meds  Nephrology following, Pending SPEP , follow up in 1-2 weeks  He needs follow up with nephrologist and pcp.       Prediabetes. A1C 6.counseled on healthy eating,carb limitation   Left LE shooting pain,sciatica  -Exam is benign without swelling, deformity. Range of motion is intact  - gabapentin   -PT/OT       Homelessness  Case management consult     Patient's Baseline: Ambulates with walking  DVT ppx: Lovenox  Code status: Full  Disposition: TBD. Currently homeless. Care plan discussed with patient/family and nurse.  [facility] Problems  Never Reviewed          Codes Class Noted POA    Acute systolic heart failure ([facility] [address] ICD-10-CM: I50.21  ICD-9-CM: 428.21  2/14/2020 Unknown        SVT (supraventricular tachycardia) ([facility]) ICD-10-CM: I47.1  ICD-9-CM: 427.89  2/10/2020 Unknown        Pneumonia ICD-10-CM: J18.9  ICD-9-CM: 486  2/10/2020 Unknown                Review of Systems:   A comprehensive review of systems was negative except for that written in the HPI. Vital Signs:    Last 24hrs VS reviewed since prior progress note. Most recent are:  Visit Vitals  /65 (BP 1 Location: Left arm, BP Patient Position: Sitting)   Pulse (!) 102   Temp 98.1 °F (36.7 °C)   Resp 18   Ht 5' 9\" (1.753 m)   Wt 77.6 kg (171 lb 1.2 oz)   SpO2 93%   BMI 25.26 kg/m²         Intake/Output Summary (Last 24 hours) at 2/14/2020 1621  Last data filed at 2/14/2020 0725  Gross per 24 hour   Intake    Output 1050 ml   Net -1050 ml        Physical Examination:             Constitutional:  No acute distress, cooperative, pleasant    ENT:  Oral mucosa moist, oropharynx benign. Resp:  Improved wheezing. CV:  Regular rhythm, normal rate, no murmurs, gallops, rubs    GI:  Soft, non distended, non tender. normoactive bowel sounds, no hepatosplenomegaly     Musculoskeletal:  No swelling, deformity or edema on the left lower extremity. Range of motion is intact. Neurologic:  Moves all extremities. AAOx3, CN II-XII reviewed     Psych:  Good insight, Not anxious nor agitated.        Data Review:    Review and/or order of clinical lab test  Review and/or order of tests in the radiology section of CPT  Review and/or order of tests in the medicine section of CPT      Labs:     Recent Labs     02/14/20  0106   WBC 11.9*   HGB 13.9   HCT 41.8        Recent Labs     02/14/20  0106 02/13/20  0310 02/12/20  0237   * 134* 134*   K 4.3 4.4 4.4    105 105   CO2 25 22 21   BUN 28* 24* 21*   CREA 2.47* 2.41* 2.74*   * 168* 261*   CA 9.2 9.1 8.7     No results for input(s): SGOT, GPT, ALT, AP, TBIL, TBILI, TP, ALB, GLOB, GGT, AML, LPSE in the last 72 hours. No lab exists for component: AMYP, HLPSE  No results for input(s): INR, PTP, APTT, INREXT, INREXT in the last 72 hours. No results for input(s): FE, TIBC, PSAT, FERR in the last 72 hours. No results found for: FOL, RBCF   No results for input(s): PH, PCO2, PO2 in the last 72 hours. No results for input(s): CPK, CKNDX, TROIQ in the last 72 hours.     No lab exists for component: CPKMB  No results found for: CHOL, CHOLX, CHLST, CHOLV, HDL, HDLP, LDL, LDLC, DLDLP, TGLX, TRIGL, TRIGP, CHHD, CHHDX  Lab Results   Component Value Date/Time    Glucose (POC) 99 02/14/2020 11:26 AM    Glucose (POC) 117 (H) 02/14/2020 07:04 AM    Glucose (POC) 185 (H) 02/13/2020 10:07 PM    Glucose (POC) 144 (H) 02/13/2020 04:58 PM    Glucose (POC) 102 (H) 02/13/2020 11:03 AM     Lab Results   Component Value Date/Time    Color YELLOW/STRAW 02/12/2020 07:02 PM    Appearance CLEAR 02/12/2020 07:02 PM    Specific gravity 1.008 02/12/2020 07:02 PM    pH (UA) 5.0 02/12/2020 07:02 PM    Protein 30 (A) 02/12/2020 07:02 PM    Glucose NEGATIVE  02/12/2020 07:02 PM    Ketone NEGATIVE  02/12/2020 07:02 PM    Bilirubin NEGATIVE  02/12/2020 07:02 PM    Urobilinogen 0.2 02/12/2020 07:02 PM    Nitrites NEGATIVE  02/12/2020 07:02 PM    Leukocyte Esterase NEGATIVE  02/12/2020 07:02 PM    Epithelial cells FEW 02/12/2020 07:02 PM    Bacteria NEGATIVE  02/12/2020 07:02 PM    WBC 0-4 02/12/2020 07:02 PM    RBC 0-5 02/12/2020 07:02 PM         Medications Reviewed:     Current Facility-Administered Medications   Medication Dose Route Frequency    carvediloL (COREG) tablet 25 mg  25 mg Oral BID WITH MEALS    albuterol-ipratropium (DUO-NEB) 2.5 MG-0.5 MG/3 ML  3 mL Nebulization BID RT    famotidine (PEPCID) tablet 20 mg  20 mg Oral QHS    sodium chloride (NS) flush 5-40 mL  5-40 mL IntraVENous Q8H    sodium chloride (NS) flush 5-40 mL  5-40 mL IntraVENous PRN    acetaminophen (TYLENOL) tablet 650 mg  650 mg Oral Q4H PRN    morphine injection 1 mg  1 mg IntraVENous Q4H PRN    ondansetron (ZOFRAN) injection 4 mg  4 mg IntraVENous Q4H PRN    insulin regular (NOVOLIN R, HUMULIN R) injection   SubCUTAneous AC&HS    glucose chewable tablet 16 g  4 Tab Oral PRN    glucagon (GLUCAGEN) injection 1 mg  1 mg IntraMUSCular PRN    dextrose 10% infusion 0-250 mL  0-250 mL IntraVENous PRN    albuterol-ipratropium (DUO-NEB) 2.5 MG-0.5 MG/3 ML  3 mL Nebulization Q4H PRN    gabapentin (NEURONTIN) capsule 100 mg  100 mg Oral TID    enoxaparin (LOVENOX) injection 40 mg  40 mg SubCUTAneous DAILY    predniSONE (DELTASONE) tablet 40 mg  40 mg Oral DAILY WITH BREAKFAST    budesonide (PULMICORT) 500 mcg/2 ml nebulizer suspension  500 mcg Nebulization BID RT    benzonatate (TESSALON) capsule 100 mg  100 mg Oral TID PRN    LORazepam (ATIVAN) tablet 2 mg  2 mg Oral Q1H PRN    LORazepam (ATIVAN) tablet 4 mg  4 mg Oral J1F PRN    folic acid (FOLVITE) tablet 1 mg  1 mg Oral DAILY    thiamine HCL (B-1) tablet 100 mg  100 mg Oral DAILY    cefTRIAXone (ROCEPHIN) 1 g in 0.9% sodium chloride (MBP/ADV) 50 mL  1 g IntraVENous Q24H    azithromycin (ZITHROMAX) 500 mg in 0.9% sodium chloride (MBP/ADV) 250 mL  500 mg IntraVENous Q24H     ______________________________________________________________________  EXPECTED LENGTH OF STAY: 3d 7h  ACTUAL LENGTH OF STAY:          4                 Mary Mae MD

## 2020-02-14 NOTE — PROGRESS NOTES
Bedside and Verbal shift change report given to [address] [place] [address],[address] (oncoming nurse) by Arcelia Hoang (offgoing nurse). Report included the following information SBAR, Kardex, Procedure Summary, MAR, Recent Results and Cardiac Rhythm NSR.

## 2020-02-14 NOTE — NURSE NAVIGATOR
Chart reviewed by Heart Failure Nurse Navigator. Heart Failure database completed. EF:  26/30%    ACEi/ARB/ARNi: contraindicated due to chronic kidney disease    BB: Coreg 25 mg twice daily    Aldosterone Antagonist: contraindicated due to chronic kidney disease    Obstructive Sleep Apnea Screening:   STOP-BANG score:   Referred to Sleep Medicine:     CRT **. NYHA Functional Class documentation requested      Heart Failure Teach Back in Patient Education. Heart Failure Avoiding Triggers on Discharge Instructions. Cardiologist: Dr. Norma Wilson ([facility])      Post discharge follow up phone call to be made within 48-72 hours of discharge.

## 2020-02-14 NOTE — PROGRESS NOTES
Cardiology Progress Note                                        Admit Date: 2/10/2020    Assessment/Plan:     Cardiomyopathy; suspect ischemic but could be due to alcoholic or idiopathic; he refused to have a cardiac cath  HTN; still needs more coverage; will maximize Coreg  CKD; reason he is not a candidate for ARNI/ACEI/ARB/Spironolactone and besides he might not be compliant  Disposition; he needs a follow up at free clinic such as [facility]; case management     Tim Weems is a 64 y.o. male with     PROBLEM LIST:  Patient Active Problem List    Diagnosis Date Noted    Acute systolic heart failure ([facility] [address] 02/14/2020    SVT (supraventricular tachycardia) ([facility] [address] 02/10/2020    Pneumonia 02/10/2020         Subjective: Tim Weems reports none.     Visit Vitals  BP (!) 136/96 (BP 1 Location: Right arm, BP Patient Position: At rest)   Pulse 90   Temp 97.8 °F (36.6 °C)   Resp 16   Ht 5' 9\" (1.753 m)   Wt 171 lb 1.2 oz (77.6 kg)   SpO2 95%   BMI 25.26 kg/m²       Intake/Output Summary (Last 24 hours) at 2/14/2020 0904  Last data filed at 2/14/2020 0725  Gross per 24 hour   Intake 490 ml   Output 1050 ml   Net -560 ml       Objective:      Physical Exam:  HEENT: Perrla, EOMI  Neck: No JVD,  No thyroidmegaly  Resp: some rales  CV: RRR s1s2 No murmur,  + s3  Abd:Soft, Nontender  Ext: 1+_edema  Neuro: Alert and oriented; Nonfocal  Skin: Warm, Dry, Intact  Pulses: 2+ DP/PT/Rad      Telemetry: normal sinus rhythm    Current Facility-Administered Medications   Medication Dose Route Frequency    carvediloL (COREG) tablet 25 mg  25 mg Oral BID WITH MEALS    famotidine (PEPCID) tablet 20 mg  20 mg Oral QHS    sodium chloride (NS) flush 5-40 mL  5-40 mL IntraVENous Q8H    sodium chloride (NS) flush 5-40 mL  5-40 mL IntraVENous PRN    acetaminophen (TYLENOL) tablet 650 mg  650 mg Oral Q4H PRN    morphine injection 1 mg  1 mg IntraVENous Q4H PRN    ondansetron (ZOFRAN) injection 4 mg  4 mg IntraVENous Q4H PRN  insulin regular (NOVOLIN R, HUMULIN R) injection   SubCUTAneous AC&HS    glucose chewable tablet 16 g  4 Tab Oral PRN    glucagon (GLUCAGEN) injection 1 mg  1 mg IntraMUSCular PRN    dextrose 10% infusion 0-250 mL  0-250 mL IntraVENous PRN    albuterol-ipratropium (DUO-NEB) 2.5 MG-0.5 MG/3 ML  3 mL Nebulization Q4H PRN    gabapentin (NEURONTIN) capsule 100 mg  100 mg Oral TID    enoxaparin (LOVENOX) injection 40 mg  40 mg SubCUTAneous DAILY    albuterol-ipratropium (DUO-NEB) 2.5 MG-0.5 MG/3 ML  3 mL Nebulization Q6H RT    predniSONE (DELTASONE) tablet 40 mg  40 mg Oral DAILY WITH BREAKFAST    budesonide (PULMICORT) 500 mcg/2 ml nebulizer suspension  500 mcg Nebulization BID RT    benzonatate (TESSALON) capsule 100 mg  100 mg Oral TID PRN    LORazepam (ATIVAN) tablet 2 mg  2 mg Oral Q1H PRN    LORazepam (ATIVAN) tablet 4 mg  4 mg Oral H3O PRN    folic acid (FOLVITE) tablet 1 mg  1 mg Oral DAILY    thiamine HCL (B-1) tablet 100 mg  100 mg Oral DAILY    cefTRIAXone (ROCEPHIN) 1 g in 0.9% sodium chloride (MBP/ADV) 50 mL  1 g IntraVENous Q24H    azithromycin (ZITHROMAX) 500 mg in 0.9% sodium chloride (MBP/ADV) 250 mL  500 mg IntraVENous Q24H         Data Review:   Labs:    Recent Results (from the past 24 hour(s))   GLUCOSE, POC    Collection Time: 02/13/20 11:03 AM   Result Value Ref Range    Glucose (POC) 102 (H) 65 - 100 mg/dL    Performed by Quintin Cates, POC    Collection Time: 02/13/20  4:58 PM   Result Value Ref Range    Glucose (POC) 144 (H) 65 - 100 mg/dL    Performed by Maria Rodriguez, POC    Collection Time: 02/13/20 10:07 PM   Result Value Ref Range    Glucose (POC) 185 (H) 65 - 100 mg/dL    Performed by Delilah Orourke    CBC WITH AUTOMATED DIFF    Collection Time: 02/14/20  1:06 AM   Result Value Ref Range    WBC 11.9 (H) 4.1 - 11.1 K/uL    RBC 4.03 (L) 4.10 - 5.70 M/uL    HGB 13.9 12.1 - 17.0 g/dL    HCT 41.8 36.6 - 50.3 %    .7 (H) 80.0 - 99.0 FL    MCH 34.5 (H) 26.0 - 34.0 PG    MCHC 33.3 30.0 - 36.5 g/dL    RDW 13.9 11.5 - 14.5 %    PLATELET 810 343 - 595 K/uL    MPV 11.2 8.9 - 12.9 FL    NRBC 0.0 0  WBC    ABSOLUTE NRBC 0.00 0.00 - 0.01 K/uL    NEUTROPHILS 88 (H) 32 - 75 %    LYMPHOCYTES 6 (L) 12 - 49 %    MONOCYTES 6 5 - 13 %    EOSINOPHILS 0 0 - 7 %    BASOPHILS 0 0 - 1 %    IMMATURE GRANULOCYTES 0 0.0 - 0.5 %    ABS. NEUTROPHILS 10.5 (H) 1.8 - 8.0 K/UL    ABS. LYMPHOCYTES 0.7 (L) 0.8 - 3.5 K/UL    ABS. MONOCYTES 0.7 0.0 - 1.0 K/UL    ABS. EOSINOPHILS 0.0 0.0 - 0.4 K/UL    ABS. BASOPHILS 0.0 0.0 - 0.1 K/UL    ABS. IMM.  GRANS. 0.0 0.00 - 0.04 K/UL    DF SMEAR SCANNED      RBC COMMENTS ANISOCYTOSIS  1+        RBC COMMENTS MACROCYTOSIS  1+       METABOLIC PANEL, BASIC    Collection Time: 02/14/20  1:06 AM   Result Value Ref Range    Sodium 135 (L) 136 - 145 mmol/L    Potassium 4.3 3.5 - 5.1 mmol/L    Chloride 104 97 - 108 mmol/L    CO2 25 21 - 32 mmol/L    Anion gap 6 5 - 15 mmol/L    Glucose 180 (H) 65 - 100 mg/dL    BUN 28 (H) 6 - 20 MG/DL    Creatinine 2.47 (H) 0.70 - 1.30 MG/DL    BUN/Creatinine ratio 11 (L) 12 - 20      GFR est AA 33 (L) >60 ml/min/1.73m2    GFR est non-AA 27 (L) >60 ml/min/1.73m2    Calcium 9.2 8.5 - 10.1 MG/DL   GLUCOSE, POC    Collection Time: 02/14/20  7:04 AM   Result Value Ref Range    Glucose (POC) 117 (H) 65 - 100 mg/dL    Performed by Joao Tate

## 2020-02-14 NOTE — PROGRESS NOTES
RENAL  PROGRESS NOTE        Subjective:   Feels well  Objective:   VITALS SIGNS:    Visit Vitals  /82 (BP 1 Location: Right arm, BP Patient Position: Post activity)   Pulse (!) 101   Temp 98 °F (36.7 °C)   Resp 18   Ht 5' 9\" (1.753 m)   Wt 77.6 kg (171 lb 1.2 oz)   SpO2 95%   BMI 25.26 kg/m²       O2 Device: Room air   O2 Flow Rate (L/min): 2 l/min   Temp (24hrs), Av.9 °F (36.6 °C), Min:96.3 °F (35.7 °C), Max:98.4 °F (36.9 °C)         PHY  NAD  DATA REVIEW:     INTAKE / OUTPUT:   Last shift:       07 - 1900  In: -   Out: 600 [Urine:600]  Last 3 shifts: 1901 -  0700  In: 590 [P.O.:490; I.V.:100]  Out: 450 [Urine:450]    Intake/Output Summary (Last 24 hours) at 2020 1236  Last data filed at 2020 0725  Gross per 24 hour   Intake    Output 1050 ml   Net -1050 ml         LABS:   Recent Labs     20  0106   WBC 11.9*   HGB 13.9   HCT 41.8        Recent Labs     20  0106 20  0310 20  0237   * 134* 134*   K 4.3 4.4 4.4    105 105   CO2 25 22 21   * 168* 261*   BUN 28* 24* 21*   CREA 2.47* 2.41* 2.74*   CA 9.2 9.1 8.7           Assessment:   CKD ,? Baseline ,likely stage 4,proteinuria 0.6 grams  No medical follow up  HTN,likely DM  cardiomyopahty EF 25 %  PNA    Plan:    SPEP. ..pending  Creatinine stable  Follow up as OP in 1-2 weeks  Florecita Osborn MD

## 2020-02-14 NOTE — PROGRESS NOTES
Problem: Mobility Impaired (Adult and Pediatric)  Goal: *Acute Goals and Plan of Care (Insert Text)  Description  FUNCTIONAL STATUS PRIOR TO ADMISSION: Patient was independent and active without use of DME.    HOME SUPPORT PRIOR TO ADMISSION: The patient is homeless with no local support. Physical Therapy Goals  Initiated 2/13/2020  1. Patient will ambulate with independence for 300 feet with no device with SpO2 >/= 90% within 7 day(s). 2.  Patient will demonstrate independence with his home stretching program for his LLE pain within 7 days     Outcome: Resolved/Met   PHYSICAL THERAPY TREATMENT/DISCHARGE  Patient: Alon Barber (65 y.o. male)  Date: 2/14/2020  Diagnosis: Pneumonia [J18.9]  SVT (supraventricular tachycardia) (HCC) [I47.1]   <principal problem not specified>  Procedure(s) (LRB):  LEFT AND RIGHT HEART CATH / CORONARY ANGIOGRAPHY (N/A)    Precautions:  fall  Chart, physical therapy assessment, plan of care and goals were reviewed. ASSESSMENT  Patient continues with skilled PT services and has progressed towards goals. Pt seen following RN clearance. Pt resting supine in bed, pleasant and agreeable to POC. Pt completed bed mobility, transfers, gait, and vitals assessment. Therapist also re-visited pt's continued complaints of LLE radiculopathy. Pt reported being familiar with the term \"sciatica\" though was not sure if he had a diagnosis of such. Pt reports shooting pain from mid-left sacrum to buttocks and down hip into calf. Pt reports motor impairment at times and feels this has resulted in balance/safety deficits at times. PT reviewed piriformis stretch and introduced from LB trunk rotation in hooklying as well as quadruped cat/cow (following poor comprehension/performance in sitting). Discussed symptoms with RN and recommend a spine vs neuro consult as indicated (she reports pt has been started on Neurontin without relief thus far).   Pt negative for L foot drop and seated MMT demonstrates 5/5 DF. Of note: pt with some report of dizziness post second lap but VSS on RA. RN notified of session events/concerns/recommendations. Recommend:  OOB to chair for all meals and TID walks in hallways    Other factors to consider for discharge: pt is homeless         PLAN :  Patient will be discharged from acute skilled physical therapy at this time. Rationale for discharge:  Goals achieved    Recommendation for discharge: (in order for the patient to meet his/her long term goals)  Outpatient physical therapy follow up recommended for LLE radiculopathy, pain, burning, radiating into posterior calf with intermittent motor impairment per pt report     This discharge recommendation:  Has not yet been discussed the attending provider and/or case management    IF patient discharges home will need the following DME: none       SUBJECTIVE:   Patient stated I have this rash on my neck. ..  Pt appears to have some irritation from shaving he is concerned about, spoke with MD briefly during her visit.     OBJECTIVE DATA SUMMARY:   Critical Behavior:  Neurologic State: Alert  Orientation Level: Appropriate for age  Cognition: Appropriate for age attention/concentration  Safety/Judgement: Awareness of environment  Functional Mobility Training:  Bed Mobility:  Rolling: Independent  Supine to Sit: Independent  Sit to Supine: (NT)  Scooting: Independent   Transfers:  Sit to Stand: Independent  Stand to Sit: Independent  Balance:  Sitting: Intact  Standing: Intact  Ambulation/Gait Training:  Distance (ft): 300 Feet (ft)     Ambulation - Level of Assistance: Independent    Therapeutic Exercises:   Handouts provided for educations and therex/stretches mentioned above (previous PT also provided handouts for their material covered)  Pain Rating:  NT    Activity Tolerance:   Good, Fair, SpO2 stable on RA, and reports of dizziness with increased activity-VSS   Please refer to the flowsheet for vital signs taken during this treatment.     After treatment patient left in no apparent distress:   Sitting in chair, Call bell within reach    COMMUNICATION/COLLABORATION:   The patients plan of care was discussed with: Registered Nurse    Elena Clancy   Time Calculation: 44 mins

## 2020-02-14 NOTE — PROGRESS NOTES
ADULT PROTOCOL: JET AEROSOL ASSESSMENT    Patient  Carlos Williamson     64 y.o.   male     2/14/2020  12:53 PM    Breath Sounds Pre Procedure: Right Breath Sounds: Coarse                               Left Breath Sounds: Coarse    Breath Sounds Post Procedure: Right Breath Sounds: Diminished                                 Left Breath Sounds: Coarse    Breathing pattern: Pre procedure Breathing Pattern: Regular          Post procedure Breathing Pattern: Regular    Heart Rate: Pre procedure Pulse: 96           Post procedure Pulse: 95    Resp Rate: Pre procedure Respirations: 18           Post procedure Respirations: 18    Peak Flow: Pre bronchodilator             Post bronchodilator       FVC/FEV1:      Incentive Spirometry:             Cough: Pre procedure Cough: Strong               Post procedure Cough: Non-productive    Suctioned: NO    Sputum: Pre procedure                   Post procedure      Oxygen: O2 Device: Room air        Changed: NO    SpO2: Pre procedure SpO2: 96 %   without oxygen              Post procedure SpO2: 96 %  without oxygen    Nebulizer Therapy: Current medications Aerosolized Medications: DuoNeb, Pulmicort      Changed: YES- space per protocol      Problem List:   Patient Active Problem List   Diagnosis Code    SVT (supraventricular tachycardia) ([facility]) I47.1    Pneumonia M84.8    Acute systolic heart failure ([facility] I50.21       Respiratory Therapist: Jv Lange

## 2020-02-14 NOTE — PROGRESS NOTES
Problem: Falls - Risk of  Goal: *Absence of Falls  Description  Document Rocky Bunting Fall Risk and appropriate interventions in the flowsheet.   Outcome: Progressing Towards Goal  Note: Fall Risk Interventions:            Medication Interventions: Teach patient to arise slowly

## 2020-02-14 NOTE — PROGRESS NOTES
2/14/2020 -   CARMELA:  - Patient again declined heart cath today, 2/14  - CM met with patient to discuss disposition  - Patient has declined calling the Crisis Hotline  - Patient has indicated a desire to return to previous living situation: patient has been, \"[place] access [place] lot of resources and information and people. \"  CRM: Read Julissa, MPH, [address] [place] [address]; Z: [phone]

## 2020-02-14 NOTE — PROGRESS NOTES
A Spiritual Care Partner Volunteer visited patient in Rm 362 on 2/14/2020.   Documented by:  Chaplain Garcia MDiv, MS, [facility]  [phone] [phone])

## 2020-02-14 NOTE — NURSE NAVIGATOR
Spoke with care manager. Patient has insurance and would not be able to follow up with [facility]. Follow up scheduled with Dr. Juany Pavon. Information on After Visit Summary.

## 2020-02-15 VITALS
RESPIRATION RATE: 16 BRPM | BODY MASS INDEX: 26.06 KG/M2 | HEART RATE: 98 BPM | DIASTOLIC BLOOD PRESSURE: 99 MMHG | WEIGHT: 175.93 LBS | OXYGEN SATURATION: 93 % | SYSTOLIC BLOOD PRESSURE: 148 MMHG | TEMPERATURE: 97.8 F | HEIGHT: 69 IN

## 2020-02-15 LAB
GLUCOSE BLD STRIP.AUTO-MCNC: 101 MG/DL (ref 65–100)
GLUCOSE BLD STRIP.AUTO-MCNC: 120 MG/DL (ref 65–100)
SERVICE CMNT-IMP: ABNORMAL
SERVICE CMNT-IMP: ABNORMAL

## 2020-02-15 PROCEDURE — 74011000250 HC RX REV CODE- 250: Performed by: HOSPITALIST

## 2020-02-15 PROCEDURE — 82962 GLUCOSE BLOOD TEST: CPT

## 2020-02-15 PROCEDURE — 74011636637 HC RX REV CODE- 636/637: Performed by: HOSPITALIST

## 2020-02-15 PROCEDURE — 74011250637 HC RX REV CODE- 250/637: Performed by: STUDENT IN AN ORGANIZED HEALTH CARE EDUCATION/TRAINING PROGRAM

## 2020-02-15 PROCEDURE — 94640 AIRWAY INHALATION TREATMENT: CPT

## 2020-02-15 PROCEDURE — 74011250637 HC RX REV CODE- 250/637: Performed by: HOSPITALIST

## 2020-02-15 PROCEDURE — 74011250636 HC RX REV CODE- 250/636: Performed by: HOSPITALIST

## 2020-02-15 PROCEDURE — 74011250637 HC RX REV CODE- 250/637: Performed by: INTERNAL MEDICINE

## 2020-02-15 PROCEDURE — 74011000250 HC RX REV CODE- 250: Performed by: INTERNAL MEDICINE

## 2020-02-15 RX ORDER — SODIUM CHLORIDE 0.9 % (FLUSH) 0.9 %
5-40 SYRINGE (ML) INJECTION AS NEEDED
Status: DISCONTINUED | OUTPATIENT
Start: 2020-02-15 | End: 2020-02-15 | Stop reason: HOSPADM

## 2020-02-15 RX ORDER — CARVEDILOL 25 MG/1
25 TABLET ORAL 2 TIMES DAILY WITH MEALS
Qty: 60 TAB | Refills: 0 | Status: SHIPPED | OUTPATIENT
Start: 2020-02-15 | End: 2020-03-16

## 2020-02-15 RX ORDER — SODIUM CHLORIDE 0.9 % (FLUSH) 0.9 %
5-40 SYRINGE (ML) INJECTION EVERY 8 HOURS
Status: DISCONTINUED | OUTPATIENT
Start: 2020-02-15 | End: 2020-02-15 | Stop reason: HOSPADM

## 2020-02-15 RX ORDER — AMOXICILLIN AND CLAVULANATE POTASSIUM 875; 125 MG/1; MG/1
1 TABLET, FILM COATED ORAL 2 TIMES DAILY
Qty: 10 TAB | Refills: 0 | Status: SHIPPED | OUTPATIENT
Start: 2020-02-15 | End: 2020-04-24

## 2020-02-15 RX ADMIN — IPRATROPIUM BROMIDE AND ALBUTEROL SULFATE 3 ML: .5; 3 SOLUTION RESPIRATORY (INHALATION) at 08:40

## 2020-02-15 RX ADMIN — ENOXAPARIN SODIUM 40 MG: 40 INJECTION SUBCUTANEOUS at 09:07

## 2020-02-15 RX ADMIN — CARVEDILOL 25 MG: 12.5 TABLET, FILM COATED ORAL at 09:07

## 2020-02-15 RX ADMIN — Medication 10 ML: at 07:13

## 2020-02-15 RX ADMIN — FOLIC ACID 1 MG: 1 TABLET ORAL at 09:07

## 2020-02-15 RX ADMIN — GABAPENTIN 100 MG: 100 CAPSULE ORAL at 09:07

## 2020-02-15 RX ADMIN — BUDESONIDE INHALATION 500 MCG: 0.5 SUSPENSION RESPIRATORY (INHALATION) at 08:40

## 2020-02-15 RX ADMIN — Medication 100 MG: at 09:06

## 2020-02-15 RX ADMIN — AMOXICILLIN AND CLAVULANATE POTASSIUM 1 TABLET: 875; 125 TABLET, FILM COATED ORAL at 09:07

## 2020-02-15 RX ADMIN — Medication 10 ML: at 07:14

## 2020-02-15 RX ADMIN — ACETAMINOPHEN 650 MG: 325 TABLET ORAL at 09:05

## 2020-02-15 RX ADMIN — PREDNISONE 40 MG: 20 TABLET ORAL at 09:07

## 2020-02-15 NOTE — DISCHARGE INSTRUCTIONS
Discharge Instructions       PATIENT ID: Dina Kelly  MRN: [id number]   YOB: 1960    DATE OF ADMISSION: 2/10/2020  9:59 PM    DATE OF DISCHARGE: 2/15/2020    PRIMARY CARE PROVIDER: None     ATTENDING PHYSICIAN: Ovidio Collazo MD  DISCHARGING PROVIDER: Barbara Marcano MD    To contact this individual call [phone] and ask the  to page. If unavailable ask to be transferred the Adult Hospitalist Department. DISCHARGE DIAGNOSES   Community acquired Pneumonia  Cardiomyopathy  CKD    CONSULTATIONS: IP CONSULT TO CARDIOLOGY  IP CONSULT TO NEPHROLOGY    PROCEDURES/SURGERIES: Procedure(s):  LEFT AND RIGHT HEART CATH / CORONARY ANGIOGRAPHY    PENDING TEST RESULTS:   At the time of discharge the following test results are still pending: SPEP follow up with Nephrology    FOLLOW UP APPOINTMENTS:   Follow-up Information     Follow up With Specialties Details Why Gaby Pemberton MD Cardiology On 2/20/2020 at 2:45 pm on 2/20/20 [address] [address]      Linda Mejia MD Nephrology In 1 week Keep scheduled appointments [place] [id number]  [phone]             ADDITIONAL CARE RECOMMENDATIONS:   · It is important that you take the medication exactly as they are prescribed. · Keep your medication in the bottles provided by the pharmacist and keep a list of the medication names, dosages, and times to be taken in your wallet. · Do not take other medications without consulting your doctor. · No drinking alcohol or driving car or operating machinery if you are on narcotic pain medications. Donot take sedating mediations if you are sleepy or confused.    · Fall Precautions  · Keep Well Hydrated  · Report to your medical provider if you feel you have  developed allergies to medications  · Follow up with your PCP or Consultant for medication adjustments and refills  · Monitor for signs of fevers,chills,bleeding,chest pain and seek medical attention if you do so. ·        DIET: Diabetic Diet      ACTIVITY: Activity as tolerated    WOUND CARE: none    EQUIPMENT needed: none      DISCHARGE MEDICATIONS:   See Medication Reconciliation Form    · It is important that you take the medication exactly as they are prescribed. · Keep your medication in the bottles provided by the pharmacist and keep a list of the medication names, dosages, and times to be taken in your wallet. · Do not take other medications without consulting your doctor. NOTIFY YOUR PHYSICIAN FOR ANY OF THE FOLLOWING:   Fever over 101 degrees for 24 hours. Chest pain, shortness of breath, fever, chills, nausea, vomiting, diarrhea, change in mentation, falling, weakness, bleeding. Severe pain or pain not relieved by medications. Or, any other signs or symptoms that you may have questions about.       DISPOSITION:    Home With:   OT  PT  HH  RN       SNF/Inpatient Rehab/LTAC    Independent/assisted living    Hospice   x homeless     CDMP Checked:   Yes x     PROBLEM LIST Updated:  Yes x       Signed:   Charley Wolf MD  2/15/2020  10:14 AM

## 2020-02-15 NOTE — PROGRESS NOTES
Bedside and Verbal shift change report given to Reggie Lemos (oncoming nurse) by Tiffany Arango (offgoing nurse). Report included the following information SBAR, Kardex, Procedure Summary, Intake/Output, MAR, Accordion, Recent Results and Med Rec Status.

## 2020-02-15 NOTE — PROGRESS NOTES
10:15 AM  CM received notification that patient requires assistance with transportation. Patient requesting to be taken to [facility] located at [address] [place] [address]. Patient is ambulatory. CM contacted [facility] [phone] to arrange transport. Confirmation number for transport is #[id number].  to contact RN station en-route and upon arrival at [phone]. ETA 30 mins to 3 hour window. RN notified of updates. Patient to be picked at ER entrance.     Juan Ramon Linwood, MSW/CRM  Care Management

## 2020-02-15 NOTE — PROGRESS NOTES
PCP CARMELA appt scheduled with [facility] to see PT in 24-48 hours after discharge at 9:00am. Appt added to [address] Jain CM Specialist

## 2020-02-15 NOTE — PROGRESS NOTES
DISCHARGE - Pt is homeless and states he will follow-up with the [address] [place] [address]. He would like, and we have arranged, transportation via cab to [facility]. Reviewed all discharge information with patient including medications, activity and follow-up appts. Pt to follow-up with cardiology and nephrology. Reviewed CHF booklet with pt, including diet and activity. IV access and monitor removed.
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
## 2020-02-16 LAB
BACTERIA SPEC CULT: NORMAL
SERVICE CMNT-IMP: NORMAL

## 2020-04-17 ENCOUNTER — HOSPITAL ENCOUNTER (EMERGENCY)
Age: 60
Discharge: OTHER INSTITUTION | End: 2020-04-17
Attending: EMERGENCY MEDICINE
Payer: MEDICAID

## 2020-04-17 ENCOUNTER — APPOINTMENT (OUTPATIENT)
Dept: CT IMAGING | Age: 60
DRG: 343 | End: 2020-04-17
Attending: GENERAL ACUTE CARE HOSPITAL
Payer: MEDICAID

## 2020-04-17 ENCOUNTER — HOSPITAL ENCOUNTER (INPATIENT)
Age: 60
LOS: 7 days | Discharge: NURSING, REHAB OR LONG-TERM CARE | DRG: 343 | End: 2020-04-24
Attending: GENERAL ACUTE CARE HOSPITAL | Admitting: GENERAL ACUTE CARE HOSPITAL
Payer: MEDICAID

## 2020-04-17 ENCOUNTER — APPOINTMENT (OUTPATIENT)
Dept: GENERAL RADIOLOGY | Age: 60
End: 2020-04-17
Attending: EMERGENCY MEDICINE
Payer: MEDICAID

## 2020-04-17 ENCOUNTER — APPOINTMENT (OUTPATIENT)
Dept: GENERAL RADIOLOGY | Age: 60
End: 2020-04-17
Attending: NURSE PRACTITIONER
Payer: MEDICAID

## 2020-04-17 DIAGNOSIS — C79.51 SPINAL CORD COMPRESSION DUE TO MALIGNANT NEOPLASM METASTATIC TO SPINE (HCC): ICD-10-CM

## 2020-04-17 DIAGNOSIS — G89.3 PAIN DUE TO NEOPLASM: ICD-10-CM

## 2020-04-17 DIAGNOSIS — C80.1 METASTATIC CARCINOMA INVOLVING BONE WITH UNKNOWN PRIMARY SITE (HCC): ICD-10-CM

## 2020-04-17 DIAGNOSIS — G82.21 PARAPLEGIA, COMPLETE (HCC): ICD-10-CM

## 2020-04-17 DIAGNOSIS — G95.29 SPINAL CORD COMPRESSION DUE TO MALIGNANT NEOPLASM METASTATIC TO SPINE (HCC): ICD-10-CM

## 2020-04-17 DIAGNOSIS — R53.0 NEOPLASTIC MALIGNANT RELATED FATIGUE: ICD-10-CM

## 2020-04-17 DIAGNOSIS — J96.01 ACUTE RESPIRATORY FAILURE WITH HYPOXIA (HCC): ICD-10-CM

## 2020-04-17 DIAGNOSIS — J18.9 PNEUMONIA OF RIGHT LOWER LOBE DUE TO INFECTIOUS ORGANISM: ICD-10-CM

## 2020-04-17 DIAGNOSIS — N17.9 ACUTE RENAL FAILURE, UNSPECIFIED ACUTE RENAL FAILURE TYPE (HCC): ICD-10-CM

## 2020-04-17 DIAGNOSIS — K85.90 ACUTE PANCREATITIS, UNSPECIFIED COMPLICATION STATUS, UNSPECIFIED PANCREATITIS TYPE: Primary | ICD-10-CM

## 2020-04-17 DIAGNOSIS — I50.21 ACUTE SYSTOLIC HEART FAILURE (HCC): ICD-10-CM

## 2020-04-17 DIAGNOSIS — Z20.822 SUSPECTED COVID-19 VIRUS INFECTION: ICD-10-CM

## 2020-04-17 DIAGNOSIS — C79.51 METASTATIC CARCINOMA INVOLVING BONE WITH UNKNOWN PRIMARY SITE (HCC): ICD-10-CM

## 2020-04-17 LAB
ALBUMIN SERPL-MCNC: 2.8 G/DL (ref 3.5–5)
ALBUMIN/GLOB SERPL: 0.7 {RATIO} (ref 1.1–2.2)
ALP SERPL-CCNC: 733 U/L (ref 45–117)
ALT SERPL-CCNC: 259 U/L (ref 12–78)
ANION GAP SERPL CALC-SCNC: 11 MMOL/L (ref 5–15)
AST SERPL-CCNC: 344 U/L (ref 15–37)
BASOPHILS # BLD: 0 K/UL (ref 0–0.1)
BASOPHILS NFR BLD: 0 % (ref 0–1)
BILIRUB SERPL-MCNC: 1.2 MG/DL (ref 0.2–1)
BUN SERPL-MCNC: 96 MG/DL (ref 6–20)
BUN/CREAT SERPL: 25 (ref 12–20)
CALCIUM SERPL-MCNC: 8.7 MG/DL (ref 8.5–10.1)
CHLORIDE SERPL-SCNC: 103 MMOL/L (ref 97–108)
CK MB CFR SERPL CALC: 1.2 % (ref 0–2.5)
CK MB SERPL-MCNC: 4.2 NG/ML (ref 5–25)
CK SERPL-CCNC: 343 U/L (ref 39–308)
CO2 SERPL-SCNC: 22 MMOL/L (ref 21–32)
CREAT SERPL-MCNC: 3.8 MG/DL (ref 0.7–1.3)
DIFFERENTIAL METHOD BLD: ABNORMAL
EOSINOPHIL # BLD: 0 K/UL (ref 0–0.4)
EOSINOPHIL NFR BLD: 0 % (ref 0–7)
ERYTHROCYTE [DISTWIDTH] IN BLOOD BY AUTOMATED COUNT: 14.3 % (ref 11.5–14.5)
ETHANOL SERPL-MCNC: <10 MG/DL
GLOBULIN SER CALC-MCNC: 4 G/DL (ref 2–4)
GLUCOSE SERPL-MCNC: 129 MG/DL (ref 65–100)
HCT VFR BLD AUTO: 47.2 % (ref 36.6–50.3)
HGB BLD-MCNC: 15.9 G/DL (ref 12.1–17)
IMM GRANULOCYTES # BLD AUTO: 0.1 K/UL (ref 0–0.04)
IMM GRANULOCYTES NFR BLD AUTO: 1 % (ref 0–0.5)
LACTATE SERPL-SCNC: 4.2 MMOL/L (ref 0.4–2)
LIPASE SERPL-CCNC: >3000 U/L (ref 73–393)
LYMPHOCYTES # BLD: 0.4 K/UL (ref 0.8–3.5)
LYMPHOCYTES NFR BLD: 4 % (ref 12–49)
MCH RBC QN AUTO: 34.4 PG (ref 26–34)
MCHC RBC AUTO-ENTMCNC: 33.7 G/DL (ref 30–36.5)
MCV RBC AUTO: 102.2 FL (ref 80–99)
MONOCYTES # BLD: 0.9 K/UL (ref 0–1)
MONOCYTES NFR BLD: 8 % (ref 5–13)
NEUTS SEG # BLD: 9.3 K/UL (ref 1.8–8)
NEUTS SEG NFR BLD: 87 % (ref 32–75)
NRBC # BLD: 0 K/UL (ref 0–0.01)
NRBC BLD-RTO: 0 PER 100 WBC
PLATELET # BLD AUTO: 258 K/UL (ref 150–400)
PMV BLD AUTO: 10.4 FL (ref 8.9–12.9)
POTASSIUM SERPL-SCNC: 5.1 MMOL/L (ref 3.5–5.1)
PROT SERPL-MCNC: 6.8 G/DL (ref 6.4–8.2)
RBC # BLD AUTO: 4.62 M/UL (ref 4.1–5.7)
RBC MORPH BLD: ABNORMAL
SODIUM SERPL-SCNC: 136 MMOL/L (ref 136–145)
TROPONIN I SERPL-MCNC: 0.33 NG/ML
TROPONIN I SERPL-MCNC: 0.35 NG/ML
WBC # BLD AUTO: 10.7 K/UL (ref 4.1–11.1)

## 2020-04-17 PROCEDURE — 74011000258 HC RX REV CODE- 258: Performed by: GENERAL ACUTE CARE HOSPITAL

## 2020-04-17 PROCEDURE — 74011250636 HC RX REV CODE- 250/636: Performed by: GENERAL ACUTE CARE HOSPITAL

## 2020-04-17 PROCEDURE — 80053 COMPREHEN METABOLIC PANEL: CPT

## 2020-04-17 PROCEDURE — 84484 ASSAY OF TROPONIN QUANT: CPT

## 2020-04-17 PROCEDURE — 36415 COLL VENOUS BLD VENIPUNCTURE: CPT

## 2020-04-17 PROCEDURE — 74011250636 HC RX REV CODE- 250/636: Performed by: NURSE PRACTITIONER

## 2020-04-17 PROCEDURE — 74176 CT ABD & PELVIS W/O CONTRAST: CPT

## 2020-04-17 PROCEDURE — 96365 THER/PROPH/DIAG IV INF INIT: CPT

## 2020-04-17 PROCEDURE — 83690 ASSAY OF LIPASE: CPT

## 2020-04-17 PROCEDURE — 99285 EMERGENCY DEPT VISIT HI MDM: CPT

## 2020-04-17 PROCEDURE — 77010033678 HC OXYGEN DAILY

## 2020-04-17 PROCEDURE — 93005 ELECTROCARDIOGRAM TRACING: CPT

## 2020-04-17 PROCEDURE — 73562 X-RAY EXAM OF KNEE 3: CPT

## 2020-04-17 PROCEDURE — 87040 BLOOD CULTURE FOR BACTERIA: CPT

## 2020-04-17 PROCEDURE — 71045 X-RAY EXAM CHEST 1 VIEW: CPT

## 2020-04-17 PROCEDURE — 85025 COMPLETE CBC W/AUTO DIFF WBC: CPT

## 2020-04-17 PROCEDURE — 80307 DRUG TEST PRSMV CHEM ANLYZR: CPT

## 2020-04-17 PROCEDURE — 71250 CT THORAX DX C-: CPT

## 2020-04-17 PROCEDURE — 82550 ASSAY OF CK (CPK): CPT

## 2020-04-17 PROCEDURE — 74011250637 HC RX REV CODE- 250/637: Performed by: GENERAL ACUTE CARE HOSPITAL

## 2020-04-17 PROCEDURE — 83605 ASSAY OF LACTIC ACID: CPT

## 2020-04-17 PROCEDURE — 65660000001 HC RM ICU INTERMED STEPDOWN

## 2020-04-17 PROCEDURE — 87635 SARS-COV-2 COVID-19 AMP PRB: CPT

## 2020-04-17 RX ORDER — DOCUSATE SODIUM 100 MG/1
100 CAPSULE, LIQUID FILLED ORAL 2 TIMES DAILY
Status: DISCONTINUED | OUTPATIENT
Start: 2020-04-17 | End: 2020-04-24 | Stop reason: HOSPADM

## 2020-04-17 RX ORDER — HEPARIN SODIUM 5000 [USP'U]/ML
5000 INJECTION, SOLUTION INTRAVENOUS; SUBCUTANEOUS EVERY 8 HOURS
Status: DISCONTINUED | OUTPATIENT
Start: 2020-04-17 | End: 2020-04-24 | Stop reason: HOSPADM

## 2020-04-17 RX ORDER — HYDROCODONE BITARTRATE AND ACETAMINOPHEN 5; 325 MG/1; MG/1
1 TABLET ORAL
Status: DISCONTINUED | OUTPATIENT
Start: 2020-04-17 | End: 2020-04-24 | Stop reason: HOSPADM

## 2020-04-17 RX ORDER — PROCHLORPERAZINE EDISYLATE 5 MG/ML
5 INJECTION INTRAMUSCULAR; INTRAVENOUS
Status: DISCONTINUED | OUTPATIENT
Start: 2020-04-17 | End: 2020-04-17

## 2020-04-17 RX ORDER — SODIUM CHLORIDE 0.9 % (FLUSH) 0.9 %
5-40 SYRINGE (ML) INJECTION AS NEEDED
Status: DISCONTINUED | OUTPATIENT
Start: 2020-04-17 | End: 2020-04-24 | Stop reason: HOSPADM

## 2020-04-17 RX ORDER — SODIUM CHLORIDE 0.9 % (FLUSH) 0.9 %
5-10 SYRINGE (ML) INJECTION AS NEEDED
Status: DISCONTINUED | OUTPATIENT
Start: 2020-04-17 | End: 2020-04-17 | Stop reason: HOSPADM

## 2020-04-17 RX ORDER — SODIUM CHLORIDE 0.9 % (FLUSH) 0.9 %
5-40 SYRINGE (ML) INJECTION EVERY 8 HOURS
Status: DISCONTINUED | OUTPATIENT
Start: 2020-04-17 | End: 2020-04-24 | Stop reason: HOSPADM

## 2020-04-17 RX ADMIN — CEFTRIAXONE 1 G: 1 INJECTION, POWDER, FOR SOLUTION INTRAMUSCULAR; INTRAVENOUS at 21:37

## 2020-04-17 RX ADMIN — AZITHROMYCIN 500 MG: 500 INJECTION, POWDER, LYOPHILIZED, FOR SOLUTION INTRAVENOUS at 17:27

## 2020-04-17 RX ADMIN — HEPARIN SODIUM 5000 UNITS: 5000 INJECTION INTRAVENOUS; SUBCUTANEOUS at 21:38

## 2020-04-17 RX ADMIN — Medication 10 ML: at 21:39

## 2020-04-17 RX ADMIN — DOCUSATE SODIUM 100 MG: 100 CAPSULE, LIQUID FILLED ORAL at 21:39

## 2020-04-17 RX ADMIN — HYDROCODONE BITARTRATE AND ACETAMINOPHEN 1 TABLET: 5; 325 TABLET ORAL at 21:40

## 2020-04-17 NOTE — PROGRESS NOTES
Date of previous inpatient admission/ ED visit? 2/10/2020  reports left hip pain and burning x a few weeks. patient's HR is sustained in the 180's for EMS. Admitted for workup. What brought the patient back to ED? Did patient decline recommended  services during last admission/ ED visit (if yes, what)? [facility] per inpatient documentation for Case Management Patient declined cardiac cath as of 2/13. Has patient seen a provider since their last inpatient admission/ED visit (if yes, when)? [facility]. PCP: First and Last name: unknown. Will suggest PCP   Name of Practice:  N/a   Are you a current patient: Yes/No:  n/a   Approximate date of last visit: n.a    CM Interventions:  From previous inpatient admission/ED visit: patient was admitted for left thigh pain. Patient remains tachycardic between 105-115 bpm while in the ER. Suggest a cardiac cath. But patient declined. Patient needed to follow-up with Nephrology and cardiology. No records of patient follow-up with specialists. [address] [address] RHAV   disability 700.00    Mac STANLEY SON     From current inpatient admission/ED visit: patient a possible COVID-19 work-up CM will need to discuss with patient ACP, follow-up appointment and family support. CM spoke with patient. Patient states he has been living on the [facility] at the [address] [facility] on [address]. does not know how much it is weekly states the agency [facility] pays for him to stay there. Patient states he is on dissability 700.00/monthly. Never  has one son Mona Pollard. States he does not know his sons number and has no additional family he could think of. Confirm with patient he has had no follow-up since discharge on 2/15/2020.     Per old CM notes:   Patient has declined calling the Crisis Hotline for stable housing  - Patient has indicated a desire to return to previous living situation: patient has been, \"[place] lot of resources and information and people. \"    Patient declined scheduling assistance for new PCP    [address] [place] [address] ([phone]).      [address] [place] [address]  [phone]

## 2020-04-17 NOTE — ED NOTES
Patient presents to ED via EMS with c/o shortness of breath and dizziness. Patient states that he has been tested  Twice in the past, once at [facility] and once at [facility] for covid-19. Patient currently homeless and staying in a motel. Denies any known contact with a person positive for Covid-29. Upon arrival to ED patient oxygen saturation at 86% on room air. Patient does not require O2 at home. EMS states that they had to place patient on 4L. Patient placed on 2L and O2 sats 93%. Patient skin and oral cavity dry and flaky. Patient  upon arrival to ED an EKG obtained. NP and MD at the bedside. Patient is alert and oriented x 4 and in no. Patient updated on plan of care and has no questions or concerns at this time. Call bell within reach. Will continue to monitor. Please reference nursing assessment. Emergency Department Nursing Plan of Care       The Nursing Plan of Care is developed from the Nursing assessment and Emergency Department Attending provider initial evaluation. The plan of care may be reviewed in the ED Provider note.     The Plan of Care was developed with the following considerations:   Patient / Family readiness to learn indicated by:verbalized understanding and successful return demonstration  Persons(s) to be included in education: patient  Barriers to Learning/Limitations:No    Signed     [address] Jain RN    4/17/2020   4:00 PM

## 2020-04-17 NOTE — PROGRESS NOTES
1825: TRANSFER - IN REPORT:    Verbal report received from [place], [place] (name) on Geovanny David  being received from [facility] ED (unit) for routine progression of care      Report consisted of patients Situation, Background, Assessment and   Recommendations(SBAR). Information from the following report(s) SBAR, Kardex and ED Summary was reviewed with the receiving nurse. Opportunity for questions and clarification was provided. Assessment completed upon patients arrival to unit and care assumed.

## 2020-04-17 NOTE — PROGRESS NOTES
1825: TRANSFER - IN REPORT: 
 
Verbal report received from [place], [facility] (name[facility] on Hill Monson  being received from [facility] ED (unit) for routine progression of care Report consisted of patients Situation, Background, Assessment and  
Recommendations(SBAR). Information from the following report(s) SBAR and Kardex was reviewed with the receiving nurse. Opportunity for questions and clarification was provided. Assessment completed upon patients arrival to unit and care assumed.

## 2020-04-17 NOTE — ED NOTES
TRANSFER - OUT REPORT:    Verbal report given to Laura Barahona RN(name) on Harris Alcaraz  being transferred to Telemetry(unit) for routine progression of care       Report consisted of patients Situation, Background, Assessment and   Recommendations(SBAR). Information from the following report(s) SBAR, Kardex, ED Summary, MAR, Recent Results and Cardiac Rhythm Sinus Tach was reviewed with the receiving nurse. Lines:   Peripheral IV 04/17/20 Left Antecubital (Active)        Opportunity for questions and clarification was provided.       Patient transported with:   Monitor  O2 @ 3 liters

## 2020-04-17 NOTE — ACP (ADVANCE CARE PLANNING)
Advance Care Planning Clinical Specialist  Conversation Note      Date of ACP Conversation: 4/17/2020    Conversation Conducted with:   Patient with [place] 51: Next of Kin by law (only applies in absence of above) (name) Suresh Ly    ACP Clinical Specialist: Cecilia Christian    *When Decision Maker makes decisions on behalf of the incapacitated patient: Decision Maker is asked to consider and make decisions based on patient values, known preferences, or best interests. Current Designated Health Care Decision Maker:   (as entered in [address] field. Validate  this information as still accurate & up-to-date; edit [address] field as needed.)    If no Decision Maker listed above or available through scanned documents, then:    [address]   Who do you trust to make healthcare decisions for you? Name:   Suresh Ly  Phone  Number: [phone] Patient does not know his son number. Can this person be reached and be able to respond quickly, such as within a few minutes or hours? YES believes the son lives locally. Who would be your back-up decision maker? Name none  Phone Number:n/a    For below questions, when conducting conversation with [address], substitute \"he\" and \"his\" for \"you\" and \"your\". Hospitalization  If your health were to worsen and it became clear that your chance of recovery was unlikely, what would your preferences be regarding hospitalization?:    Choice:  [x]  The patient would want hospitalization  []  The patient would prefer comfort-focused treatment without hospitalization. Ventilation  If you were in your present state of health and suddenly became very ill and were unable to breathe on your own, what would your preference be about the use of a ventilator (breathing machine) if it were available to you?       If patient would desire the use of a ventilator (breathing machine), answer \"yes\", if not \"no\":yes    If your health were to worsen and it became clear that your chance of recovery was unlikely, would that change your answer? NO    Resuscitation  CPR works best to restart the heart when there is a sudden event, like a heart attack, in someone who is otherwise healthy. Unfortunately, CPR does not typically restart the heart for people who have serious health conditions or who are very sick. In the event your heart stopped, would you want attempts to restart your heart (answer \"yes\") or would you prefer a natural death (answer \"no\")? yes    If your health were to worsen and it became clear that your chance of recovery was unlikely, would that change your answer? NO    [x] Yes  [] No   Educated Patient / Reuel Ego regarding differences between Advance Directives and portable DNR orders.     Length of ACP Conversation in minutes:      Conversation Outcomes:  [x] ACP discussion completed  [] Existing advance directive reviewed with patient; no changes to patient's previously recorded wishes   [] New Advance Directive completed   [] Portable Do Not Rescitate prepared for Provider review and signature  [] POLST/POST/MOLST/MOST prepared for Provider review and signature      Follow-up plan:    [] Schedule follow-up conversation to continue planning  [] Referred individual to Provider for additional questions/concerns   [] Advised patient/agent/surrogate to review completed ACP document and update if needed with changes in condition, patient preferences or care setting     [x] This note routed to one or more involved healthcare providers

## 2020-04-18 VITALS
BODY MASS INDEX: 22.22 KG/M2 | SYSTOLIC BLOOD PRESSURE: 112 MMHG | RESPIRATION RATE: 16 BRPM | HEART RATE: 57 BPM | HEIGHT: 69 IN | DIASTOLIC BLOOD PRESSURE: 86 MMHG | WEIGHT: 150 LBS | OXYGEN SATURATION: 98 % | TEMPERATURE: 98 F

## 2020-04-18 LAB
ALBUMIN SERPL-MCNC: 2.6 G/DL (ref 3.5–5)
ALBUMIN/GLOB SERPL: 0.7 {RATIO} (ref 1.1–2.2)
ALP SERPL-CCNC: 696 U/L (ref 45–117)
ALT SERPL-CCNC: 254 U/L (ref 12–78)
AMPHET UR QL SCN: NEGATIVE
ANION GAP SERPL CALC-SCNC: 8 MMOL/L (ref 5–15)
APPEARANCE UR: CLEAR
AST SERPL-CCNC: 388 U/L (ref 15–37)
ATRIAL RATE: 111 BPM
BACTERIA URNS QL MICRO: NEGATIVE /HPF
BARBITURATES UR QL SCN: NEGATIVE
BASOPHILS # BLD: 0 K/UL (ref 0–0.1)
BASOPHILS NFR BLD: 0 % (ref 0–1)
BENZODIAZ UR QL: NEGATIVE
BILIRUB SERPL-MCNC: 1.1 MG/DL (ref 0.2–1)
BILIRUB UR QL: NEGATIVE
BUN SERPL-MCNC: 92 MG/DL (ref 6–20)
BUN/CREAT SERPL: 28 (ref 12–20)
CALCIUM SERPL-MCNC: 8.3 MG/DL (ref 8.5–10.1)
CALCULATED P AXIS, ECG09: 74 DEGREES
CALCULATED R AXIS, ECG10: -67 DEGREES
CALCULATED T AXIS, ECG11: 78 DEGREES
CANNABINOIDS UR QL SCN: NEGATIVE
CHLORIDE SERPL-SCNC: 108 MMOL/L (ref 97–108)
CHOLEST SERPL-MCNC: 144 MG/DL
CO2 SERPL-SCNC: 20 MMOL/L (ref 21–32)
COCAINE UR QL SCN: NEGATIVE
COLOR UR: ABNORMAL
COMMENT, HOLDF: NORMAL
CREAT SERPL-MCNC: 3.31 MG/DL (ref 0.7–1.3)
D DIMER PPP FEU-MCNC: 11.32 MG/L FEU (ref 0–0.65)
D DIMER PPP FEU-MCNC: 9.94 MG/L FEU (ref 0–0.65)
DIAGNOSIS, 93000: NORMAL
DIFFERENTIAL METHOD BLD: ABNORMAL
DRUG SCRN COMMENT,DRGCM: ABNORMAL
EOSINOPHIL # BLD: 0 K/UL (ref 0–0.4)
EOSINOPHIL NFR BLD: 0 % (ref 0–7)
EPITH CASTS URNS QL MICRO: ABNORMAL /LPF
ERYTHROCYTE [DISTWIDTH] IN BLOOD BY AUTOMATED COUNT: 14.4 % (ref 11.5–14.5)
FERRITIN SERPL-MCNC: 973 NG/ML (ref 26–388)
GLOBULIN SER CALC-MCNC: 4 G/DL (ref 2–4)
GLUCOSE SERPL-MCNC: 121 MG/DL (ref 65–100)
GLUCOSE UR STRIP.AUTO-MCNC: NEGATIVE MG/DL
HCT VFR BLD AUTO: 44 % (ref 36.6–50.3)
HDLC SERPL-MCNC: 22 MG/DL
HDLC SERPL: 6.5 {RATIO} (ref 0–5)
HGB BLD-MCNC: 15 G/DL (ref 12.1–17)
HGB UR QL STRIP: NEGATIVE
IMM GRANULOCYTES # BLD AUTO: 0.1 K/UL (ref 0–0.04)
IMM GRANULOCYTES NFR BLD AUTO: 1 % (ref 0–0.5)
KETONES UR QL STRIP.AUTO: NEGATIVE MG/DL
LACTATE SERPL-SCNC: 1.8 MMOL/L (ref 0.4–2)
LDH SERPL L TO P-CCNC: 1698 U/L (ref 85–241)
LDH SERPL L TO P-CCNC: 1800 U/L (ref 85–241)
LDLC SERPL CALC-MCNC: 98.8 MG/DL (ref 0–100)
LEUKOCYTE ESTERASE UR QL STRIP.AUTO: NEGATIVE
LIPASE SERPL-CCNC: >3000 U/L (ref 73–393)
LIPID PROFILE,FLP: ABNORMAL
LYMPHOCYTES # BLD: 0.5 K/UL (ref 0.8–3.5)
LYMPHOCYTES NFR BLD: 5 % (ref 12–49)
MAGNESIUM SERPL-MCNC: 3 MG/DL (ref 1.6–2.4)
MCH RBC QN AUTO: 34.2 PG (ref 26–34)
MCHC RBC AUTO-ENTMCNC: 34.1 G/DL (ref 30–36.5)
MCV RBC AUTO: 100.2 FL (ref 80–99)
METHADONE UR QL: NEGATIVE
MONOCYTES # BLD: 0.9 K/UL (ref 0–1)
MONOCYTES NFR BLD: 9 % (ref 5–13)
NEUTS SEG # BLD: 8 K/UL (ref 1.8–8)
NEUTS SEG NFR BLD: 85 % (ref 32–75)
NITRITE UR QL STRIP.AUTO: NEGATIVE
NRBC # BLD: 0 K/UL (ref 0–0.01)
NRBC BLD-RTO: 0 PER 100 WBC
OPIATES UR QL: POSITIVE
P-R INTERVAL, ECG05: 128 MS
PCP UR QL: NEGATIVE
PH UR STRIP: 5 [PH] (ref 5–8)
PHOSPHATE SERPL-MCNC: 3.8 MG/DL (ref 2.6–4.7)
PLATELET # BLD AUTO: 213 K/UL (ref 150–400)
PMV BLD AUTO: 10.5 FL (ref 8.9–12.9)
POTASSIUM SERPL-SCNC: 5.5 MMOL/L (ref 3.5–5.1)
PROCALCITONIN SERPL-MCNC: 33.67 NG/ML
PROCALCITONIN SERPL-MCNC: 35.04 NG/ML
PROT SERPL-MCNC: 6.6 G/DL (ref 6.4–8.2)
PROT UR STRIP-MCNC: 30 MG/DL
Q-T INTERVAL, ECG07: 354 MS
QRS DURATION, ECG06: 80 MS
QTC CALCULATION (BEZET), ECG08: 481 MS
RBC # BLD AUTO: 4.39 M/UL (ref 4.1–5.7)
RBC #/AREA URNS HPF: ABNORMAL /HPF (ref 0–5)
RBC MORPH BLD: ABNORMAL
SAMPLES BEING HELD,HOLD: NORMAL
SODIUM SERPL-SCNC: 136 MMOL/L (ref 136–145)
SP GR UR REFRACTOMETRY: 1.02 (ref 1–1.03)
TRIGL SERPL-MCNC: 116 MG/DL (ref ?–150)
TROPONIN I SERPL-MCNC: 0.29 NG/ML
TROPONIN I SERPL-MCNC: 0.31 NG/ML
UA: UC IF INDICATED,UAUC: ABNORMAL
UROBILINOGEN UR QL STRIP.AUTO: 0.2 EU/DL (ref 0.2–1)
VENTRICULAR RATE, ECG03: 111 BPM
VLDLC SERPL CALC-MCNC: 23.2 MG/DL
WBC # BLD AUTO: 9.5 K/UL (ref 4.1–11.1)
WBC URNS QL MICRO: ABNORMAL /HPF (ref 0–4)

## 2020-04-18 PROCEDURE — 83615 LACTATE (LD) (LDH) ENZYME: CPT

## 2020-04-18 PROCEDURE — 85379 FIBRIN DEGRADATION QUANT: CPT

## 2020-04-18 PROCEDURE — 74011250637 HC RX REV CODE- 250/637: Performed by: INTERNAL MEDICINE

## 2020-04-18 PROCEDURE — 74011250637 HC RX REV CODE- 250/637: Performed by: GENERAL ACUTE CARE HOSPITAL

## 2020-04-18 PROCEDURE — 82728 ASSAY OF FERRITIN: CPT

## 2020-04-18 PROCEDURE — 83520 IMMUNOASSAY QUANT NOS NONAB: CPT

## 2020-04-18 PROCEDURE — 74011000250 HC RX REV CODE- 250

## 2020-04-18 PROCEDURE — 80307 DRUG TEST PRSMV CHEM ANLYZR: CPT

## 2020-04-18 PROCEDURE — 83735 ASSAY OF MAGNESIUM: CPT

## 2020-04-18 PROCEDURE — 85025 COMPLETE CBC W/AUTO DIFF WBC: CPT

## 2020-04-18 PROCEDURE — 65660000001 HC RM ICU INTERMED STEPDOWN

## 2020-04-18 PROCEDURE — 36415 COLL VENOUS BLD VENIPUNCTURE: CPT

## 2020-04-18 PROCEDURE — 74011250636 HC RX REV CODE- 250/636: Performed by: INTERNAL MEDICINE

## 2020-04-18 PROCEDURE — 77030005518 HC CATH URETH FOL 2W BARD -B

## 2020-04-18 PROCEDURE — 83690 ASSAY OF LIPASE: CPT

## 2020-04-18 PROCEDURE — 74011250636 HC RX REV CODE- 250/636: Performed by: GENERAL ACUTE CARE HOSPITAL

## 2020-04-18 PROCEDURE — 84100 ASSAY OF PHOSPHORUS: CPT

## 2020-04-18 PROCEDURE — 83605 ASSAY OF LACTIC ACID: CPT

## 2020-04-18 PROCEDURE — 80061 LIPID PANEL: CPT

## 2020-04-18 PROCEDURE — 93005 ELECTROCARDIOGRAM TRACING: CPT

## 2020-04-18 PROCEDURE — 74011000250 HC RX REV CODE- 250: Performed by: INTERNAL MEDICINE

## 2020-04-18 PROCEDURE — 84484 ASSAY OF TROPONIN QUANT: CPT

## 2020-04-18 PROCEDURE — 51798 US URINE CAPACITY MEASURE: CPT

## 2020-04-18 PROCEDURE — 81001 URINALYSIS AUTO W/SCOPE: CPT

## 2020-04-18 PROCEDURE — 74011000258 HC RX REV CODE- 258: Performed by: GENERAL ACUTE CARE HOSPITAL

## 2020-04-18 PROCEDURE — 77010033678 HC OXYGEN DAILY

## 2020-04-18 PROCEDURE — 84145 PROCALCITONIN (PCT): CPT

## 2020-04-18 PROCEDURE — 80053 COMPREHEN METABOLIC PANEL: CPT

## 2020-04-18 RX ORDER — MORPHINE SULFATE 2 MG/ML
1 INJECTION, SOLUTION INTRAMUSCULAR; INTRAVENOUS
Status: DISCONTINUED | OUTPATIENT
Start: 2020-04-18 | End: 2020-04-22

## 2020-04-18 RX ORDER — DILTIAZEM HYDROCHLORIDE 5 MG/ML
10 INJECTION INTRAVENOUS ONCE
Status: COMPLETED | OUTPATIENT
Start: 2020-04-18 | End: 2020-04-18

## 2020-04-18 RX ORDER — DOXYCYCLINE HYCLATE 100 MG
100 TABLET ORAL EVERY 12 HOURS
Status: COMPLETED | OUTPATIENT
Start: 2020-04-18 | End: 2020-04-22

## 2020-04-18 RX ORDER — ASPIRIN 325 MG/1
100 TABLET, FILM COATED ORAL DAILY
Status: DISCONTINUED | OUTPATIENT
Start: 2020-04-18 | End: 2020-04-24 | Stop reason: HOSPADM

## 2020-04-18 RX ORDER — DILTIAZEM HYDROCHLORIDE 5 MG/ML
INJECTION INTRAVENOUS
Status: COMPLETED
Start: 2020-04-18 | End: 2020-04-18

## 2020-04-18 RX ORDER — METOPROLOL TARTRATE 5 MG/5ML
2.5 INJECTION INTRAVENOUS
Status: DISCONTINUED | OUTPATIENT
Start: 2020-04-18 | End: 2020-04-24 | Stop reason: HOSPADM

## 2020-04-18 RX ORDER — METOPROLOL TARTRATE 50 MG/1
50 TABLET ORAL 2 TIMES DAILY
Status: DISCONTINUED | OUTPATIENT
Start: 2020-04-18 | End: 2020-04-24 | Stop reason: HOSPADM

## 2020-04-18 RX ORDER — DILTIAZEM HYDROCHLORIDE 5 MG/ML
5 INJECTION INTRAVENOUS
Status: DISCONTINUED | OUTPATIENT
Start: 2020-04-18 | End: 2020-04-18 | Stop reason: SDUPTHER

## 2020-04-18 RX ORDER — ZINC SULFATE 50(220)MG
1 CAPSULE ORAL DAILY
Status: DISCONTINUED | OUTPATIENT
Start: 2020-04-19 | End: 2020-04-20

## 2020-04-18 RX ORDER — THERA TABS 400 MCG
1 TAB ORAL DAILY
Status: DISCONTINUED | OUTPATIENT
Start: 2020-04-19 | End: 2020-04-24 | Stop reason: HOSPADM

## 2020-04-18 RX ORDER — LIDOCAINE HYDROCHLORIDE 20 MG/ML
JELLY TOPICAL ONCE
Status: COMPLETED | OUTPATIENT
Start: 2020-04-18 | End: 2020-04-18

## 2020-04-18 RX ADMIN — DOCUSATE SODIUM 100 MG: 100 CAPSULE, LIQUID FILLED ORAL at 09:34

## 2020-04-18 RX ADMIN — HEPARIN SODIUM 5000 UNITS: 5000 INJECTION INTRAVENOUS; SUBCUTANEOUS at 13:17

## 2020-04-18 RX ADMIN — DOXYCYCLINE HYCLATE 100 MG: 100 TABLET, COATED ORAL at 20:40

## 2020-04-18 RX ADMIN — DILTIAZEM HYDROCHLORIDE 10 MG: 5 INJECTION INTRAVENOUS at 13:48

## 2020-04-18 RX ADMIN — THIAMINE HCL TAB 100 MG 100 MG: 100 TAB at 13:17

## 2020-04-18 RX ADMIN — MORPHINE SULFATE 1 MG: 2 INJECTION, SOLUTION INTRAMUSCULAR; INTRAVENOUS at 13:16

## 2020-04-18 RX ADMIN — METOPROLOL TARTRATE 50 MG: 50 TABLET, FILM COATED ORAL at 17:47

## 2020-04-18 RX ADMIN — HYDROCODONE BITARTRATE AND ACETAMINOPHEN 1 TABLET: 5; 325 TABLET ORAL at 09:32

## 2020-04-18 RX ADMIN — DOCUSATE SODIUM 100 MG: 100 CAPSULE, LIQUID FILLED ORAL at 17:47

## 2020-04-18 RX ADMIN — HEPARIN SODIUM 5000 UNITS: 5000 INJECTION INTRAVENOUS; SUBCUTANEOUS at 04:30

## 2020-04-18 RX ADMIN — HEPARIN SODIUM 5000 UNITS: 5000 INJECTION INTRAVENOUS; SUBCUTANEOUS at 20:41

## 2020-04-18 RX ADMIN — Medication 10 ML: at 13:19

## 2020-04-18 RX ADMIN — Medication 10 ML: at 06:53

## 2020-04-18 RX ADMIN — LIDOCAINE HYDROCHLORIDE: 20 JELLY TOPICAL at 17:40

## 2020-04-18 RX ADMIN — HYDROCODONE BITARTRATE AND ACETAMINOPHEN 1 TABLET: 5; 325 TABLET ORAL at 18:08

## 2020-04-18 RX ADMIN — DOXYCYCLINE HYCLATE 100 MG: 100 TABLET, COATED ORAL at 15:04

## 2020-04-18 RX ADMIN — CEFTRIAXONE 1 G: 1 INJECTION, POWDER, FOR SOLUTION INTRAMUSCULAR; INTRAVENOUS at 20:43

## 2020-04-18 NOTE — PROGRESS NOTES
TRANSFER - IN REPORT:  Verbal report received from Mariel Corrigan RN(name) on May Vickers  being received from [facility]) for routine progression of care    Report consisted of patients Situation, Background, Assessment and   Recommendations(SBAR). Information from the following report(s) SBAR, Kardex, ED Summary, Procedure Summary, Intake/Output, MAR and Recent Results was reviewed with the receiving nurse. Opportunity for questions and clarification was provided. Assessment completed upon patients arrival to unit and care assumed. Primary Nurse Suraj Eller RN and Aniya Kay RN performed a dual skin assessment on this patient No impairment noted  Marco Antonio score is 20.

## 2020-04-18 NOTE — CONSULTS
[facility]    Name:  Kae Balbuena  MR#:  [id number]  :  1960  ACCOUNT #:  [de-identified]  DATE OF SERVICE:  2020    CHIEF COMPLAINT:  I am asked to see by Dr. Starr Amaral for pancreatitis in a complex patient. HISTORY OF PRESENT ILLNESS:  The patient is 64years old. The history is obtained from him and from the chart. He was admitted yesterday with hypoxic [facility] failure from [facility]. There were concerns about abnormal CT lung malignancy. He had elevated liver tests and an elevated lipase. He does not give a very detailed history. He has been sick for about a month with abdominal pain. The abdominal pain is throughout the entire abdomen. It does not radiate to the back. He previously drank one or two drinks per day and quit drinking around the same time of the pain. He does not tell me that he has a history of serious alcohol use or abuse. There is a recent tachycardia and a recent RRT call. James Sarah PAST MEDICAL HISTORY:  Hypotension and vertigo. ALLERGIES:  None known. SURGERIES:  None known. SOCIAL HISTORY:  No longer smokes. Alcohol as noted above. FAMILY HISTORY:  Negative for colon cancer or polyps. REVIEW OF SYSTEMS:  There is no unexplained weight loss. I reviewed Dr. Helio Sanders review of systems in detail as I was not able to get details from the patient. PHYSICAL EXAMINATION:  GENERAL:  Appears stated age, in no acute distress. VITAL SIGNS:  Pulse up to 120 today, blood pressure 112/80, temperature 97.4, respirations 16. EYES:  No icterus. ENMT:  Lips, teeth, gums, and oropharynx unremarkable. CHEST:  Clear to auscultation. No use of accessory muscle. HEART:  Regular rate and rhythm. ABDOMEN:  Full, particularly full in the suprapubic area and bilateral lower quadrant. He is tender and firm allover. Bowel sounds are present. No rebound.   LYMPHATIC:  No anterior or posterior cervical, supraclavicular, inguinal, or periumbilical lymph nodes. EXTREMITIES:  No edema. NEUROLOGIC:  Alert and oriented. LABORATORY DATA:  Hemoglobin 15, white count 9.5. Sodium 136, potassium 5.5, CO2 of 20, chloride 108. Creatinine 3.31, BUN 96. He has a history of chronic kidney disease with prior BUN 22 and creatinine 2.41 in 02/2020. Lipase over 3000, alk phos 696, , , total bilirubin 1.1. Troponins are high 0.31. CT abdomen and pelvis was performed on 04/17/2020, it shows mediastinal right hilar and retroperitoneal lymphadenopathy, right lower lobe mass lesion versus post-obstructive consolidation, interstitial infiltrate in right upper lobe, pleural-based mass throughout the right upper lobe, hepatic metastasis, left adrenal metastasis, marked distention of the bladder with soft tissue nodule in right involving the left bladder, which may represent an omental metastasis. I noted on that scan that the common bile duct is not dilated and there is fullness of the pancreatic head, but evaluation limited by lack of oral and IV contrast.    IMPRESSION REPORT AND PLAN:  1. Elevated lipase. 2.  Elevated liver test.  3.  Hepatic metastatic disease by CT. 4.  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 5.  Hypocalcemia, rule out [place] syndrome. RECOMMENDATIONS:  1.  I will give him intravenous fluid and time for management of the pancreatitis component of this. I will order a CA 19-9. I will also order a morning cortisol level. Once we know a primary tissue diagnosis (malignancy most likely tuberculosis possible), then we can talk about how or whether to treat the hepatic metastatic disease. I will also do a workup for parenchymal disease. 2.  I thank, Dr. Ricky Dobson, for this interesting consultation.         Dar Whitman MD      [id number]  D:  04/18/2020 14:48  T:  04/18/2020 18:25  JOB #:  [id number]  CC:  Zaira Snider MD

## 2020-04-18 NOTE — H&P
Hospitalist Admission Note    NAME: Madalyn Giang   :  1960   MRN:  [id number]     Date/Time:  2020 8:16 PM    Patient PCP: None  ______________________________________________________________________  Given the patient's current clinical presentation, I have a high level of concern for decompensation if discharged from the emergency department. Complex decision making was performed, which includes reviewing the patient's available past medical records, laboratory results, and x-ray films. My assessment of this patient's clinical condition and my plan of care is as follows. Assessment / Plan:  Acute hypoxic respiratory failure, currently on 2L NC  Pneumonia  Rule out COVID-19  ? Lung malignancy  Vertigo  Admit to Respiratory Unit  COVID-19 testing in progress - unclear whether he was actually swabbed at [facility], therefore have asked our staff to complete this  Empiric abx  Follow up Cx results  Continue O2 supplementation - currently on 2L NC - wean as tolerated  Of note, pt likely has COPD, not officially diagnosed, and may have an O2 requirement at baseline. He is a former long time smoker, does not follow up with any medical appointments and does not take any medications. Will obtain a CT Chest w/o contrast as doing one already for CT Abdo - hopeful that this will elucidate CXR findings a little more    Acute pancreatitis  Elevated LFTs  Abdo imaging pending - follow  Repeat Lipase in AM, currently > 3000k  No clear etiology - states that he last drank alcohol 3 weeks ago. Unclear how reliable he is. CLD  Ant-emetics PRN  GI Consult. Elevated troponin  Hx of newly diagnosed systolic HF in 3106  Hx of SVT  Trend troponins and EKG  Initial trop 0.35  Continue to monitor, pt denies any CP  Pt again does not want any cardiac workup and therefore Cardiology Consult not placed.  If pt's troponin continues to trend higher and/or he wishes for further workup, can consider placing consult. GUANAKO on CKD IV  Pt missed his Nephrology follow up  Will repeat labs in AM  Avoid nephrotoxic drugs  Gentle IV hydration  Consult Nephrology if pt's renal function does not improve    Code Status: Full  Surrogate Decision Maker:  DVT Prophylaxis: Heparin  GI Prophylaxis: not indicated  Baseline: Independent, homeless      Subjective:   CHIEF COMPLAINT: left sided abdominal pain, dizziness, transfer from [facility]. HISTORY OF PRESENT ILLNESS:     Albert Domingo is a 64 y.o.  male who presents with CC listed above. Pt is an unreliable historian as his information is convoluted at best. He knows he was hospitalized in February at [facility]. He denies going to any follow up appointment. He endorses chronic complains of cough and vertigo. Now feeling weak and having generalized abdominal pain. This has been on going for supposedly 6 weeks. We were asked to admit for work up and evaluation of the above problems. Past Medical History:   Diagnosis Date    Hypotension     Vertigo         No past surgical history on file. Social History     Tobacco Use    Smoking status: Former Smoker   Substance Use Topics    Alcohol use: Yes     Comment: 2-3 cans of beer a day. No family history on file. Pt unaware of any family history. No Known Allergies     Prior to Admission medications    Medication Sig Start Date End Date Taking? Authorizing Provider   amoxicillin-clavulanate (AUGMENTIN) 875-125 mg per tablet Take 1 Tab by mouth two (2) times a day. 2/15/20   Prince Hankins MD       REVIEW OF SYSTEMS:     I am not able to complete the review of systems because:    The patient is intubated and sedated    The patient has altered mental status due to his acute medical problems    The patient has baseline aphasia from prior stroke(s)    The patient has baseline dementia and is not reliable historian    The patient is in acute medical distress and unable to provide information           Total of 12 systems reviewed as follows:       POSITIVE= underlined text  Negative = text not underlined  General:  fever, chills, sweats, generalized weakness, weight loss/gain,      loss of appetite   Eyes:    blurred vision, eye pain, loss of vision, double vision  ENT:    rhinorrhea, pharyngitis   Respiratory:   cough, sputum production, SOB, GRIFFIN, wheezing, pleuritic pain   Cardiology:   chest pain, palpitations, orthopnea, PND, edema, syncope   Gastrointestinal:  abdominal pain , N/V, diarrhea, dysphagia, constipation, bleeding   Genitourinary:  frequency, urgency, dysuria, hematuria, incontinence   Muskuloskeletal :  arthralgia, myalgia, back pain  Hematology:  easy bruising, nose or gum bleeding, lymphadenopathy   Dermatological: rash, ulceration, pruritis, color change / jaundice  Endocrine:   hot flashes or polydipsia   Neurological:  headache, dizziness, confusion, focal weakness, paresthesia,     Speech difficulties, memory loss, gait difficulty  Psychological: Feelings of anxiety, depression, agitation    Objective:   VITALS:    Visit Vitals  /82 (BP 1 Location: Right arm, BP Patient Position: At rest)   Pulse (!) 101   Temp 97.6 °F (36.4 °C)   Resp 16       PHYSICAL EXAM:    General:    Alert, cooperative, no distress, appears stated age. HEENT: Atraumatic, anicteric sclerae, pink conjunctivae     No oral ulcers, mucosa moist, throat clear, dentition fair  Neck:  Supple, symmetrical,  thyroid: non tender  Lungs:   Clear to auscultation bilaterally. No Wheezing or Rhonchi. No rales. Chest wall:  No tenderness  No Accessory muscle use. Heart:   Regular  rhythm,  No  murmur   No edema  Abdomen:   Soft, slight TTP, +guarding, no rigidity, no rebound tenderness  Extremities: No cyanosis. No clubbing,      Skin turgor normal, Capillary refill normal, Radial dial pulse 2+  Skin:     Not pale. Not Jaundiced  No rashes   Psych:  Fair insight. Not depressed.   Not anxious or agitated. Neurologic: EOMs intact. No facial asymmetry. No aphasia or slurred speech. Symmetrical strength, Sensation grossly intact. Alert and oriented X 4.     _______________________________________________________________________  Care Plan discussed with:    Comments   Patient x    Family      RN xx    Care Manager                    Consultant:      _______________________________________________________________________  Expected  Disposition:   Home with Family    HH/PT/OT/RN    SNF/LTC    PEG    ________________________________________________________________________  TOTAL TIME:  54 Minutes    Critical Care Provided     Minutes non procedure based      Comments     Reviewed previous records   >50% of visit spent in counseling and coordination of care  Discussion with patient and/or family and questions answered       ________________________________________________________________________  Signed: Mel Leigh MD    Procedures: see electronic medical records for all procedures/Xrays and details which were not copied into this note but were reviewed prior to creation of Plan. LAB DATA REVIEWED:    Recent Results (from the past 24 hour(s))   CBC WITH AUTOMATED DIFF    Collection Time: 04/17/20  4:32 PM   Result Value Ref Range    WBC 10.7 4.1 - 11.1 K/uL    RBC 4.62 4.10 - 5.70 M/uL    HGB 15.9 12.1 - 17.0 g/dL    HCT 47.2 36.6 - 50.3 %    .2 (H) 80.0 - 99.0 FL    MCH 34.4 (H) 26.0 - 34.0 PG    MCHC 33.7 30.0 - 36.5 g/dL    RDW 14.3 11.5 - 14.5 %    PLATELET 591 495 - 037 K/uL    MPV 10.4 8.9 - 12.9 FL    NRBC 0.0 0  WBC    ABSOLUTE NRBC 0.00 0.00 - 0.01 K/uL    NEUTROPHILS 87 (H) 32 - 75 %    LYMPHOCYTES 4 (L) 12 - 49 %    MONOCYTES 8 5 - 13 %    EOSINOPHILS 0 0 - 7 %    BASOPHILS 0 0 - 1 %    IMMATURE GRANULOCYTES 1 (H) 0.0 - 0.5 %    ABS. NEUTROPHILS 9.3 (H) 1.8 - 8.0 K/UL    ABS. LYMPHOCYTES 0.4 (L) 0.8 - 3.5 K/UL    ABS. MONOCYTES 0.9 0.0 - 1.0 K/UL    ABS.  EOSINOPHILS 0.0 0.0 - 0.4 K/UL    ABS. BASOPHILS 0.0 0.0 - 0.1 K/UL    ABS. IMM. GRANS. 0.1 (H) 0.00 - 0.04 K/UL    DF SMEAR SCANNED      RBC COMMENTS NORMOCYTIC, NORMOCHROMIC     METABOLIC PANEL, COMPREHENSIVE    Collection Time: 04/17/20  4:32 PM   Result Value Ref Range    Sodium 136 136 - 145 mmol/L    Potassium 5.1 3.5 - 5.1 mmol/L    Chloride 103 97 - 108 mmol/L    CO2 22 21 - 32 mmol/L    Anion gap 11 5 - 15 mmol/L    Glucose 129 (H) 65 - 100 mg/dL    BUN 96 (H) 6 - 20 MG/DL    Creatinine 3.80 (H) 0.70 - 1.30 MG/DL    BUN/Creatinine ratio 25 (H) 12 - 20      GFR est AA 20 (L) >60 ml/min/1.73m2    GFR est non-AA 17 (L) >60 ml/min/1.73m2    Calcium 8.7 8.5 - 10.1 MG/DL    Bilirubin, total 1.2 (H) 0.2 - 1.0 MG/DL    ALT (SGPT) 259 (H) 12 - 78 U/L    AST (SGOT) 344 (H) 15 - 37 U/L    Alk.  phosphatase 733 (H) 45 - 117 U/L    Protein, total 6.8 6.4 - 8.2 g/dL    Albumin 2.8 (L) 3.5 - 5.0 g/dL    Globulin 4.0 2.0 - 4.0 g/dL    A-G Ratio 0.7 (L) 1.1 - 2.2     TROPONIN I    Collection Time: 04/17/20  4:32 PM   Result Value Ref Range    Troponin-I, Qt. 0.35 (H) <0.05 ng/mL   CK W/ CKMB & INDEX    Collection Time: 04/17/20  4:32 PM   Result Value Ref Range    CK - MB 4.2 (H) <3.6 NG/ML    CK-MB Index 1.2 0.0 - 2.5       (H) 39 - 308 U/L   LACTIC ACID    Collection Time: 04/17/20  4:32 PM   Result Value Ref Range    Lactic acid 4.2 (HH) 0.4 - 2.0 MMOL/L   LIPASE    Collection Time: 04/17/20  4:32 PM   Result Value Ref Range    Lipase >3,000 (H) 73 - 393 U/L   ETHYL ALCOHOL    Collection Time: 04/17/20  4:32 PM   Result Value Ref Range    ALCOHOL(ETHYL),SERUM <10 <10 MG/DL

## 2020-04-18 NOTE — CONSULTS
Consultation Note    NAME: Albert Domingo   :  1960   MRN:  [id number]     Date/Time:  2020 11:37 AM    I have been asked to see this patient by Dr. Mike Moore  for advice/opinion re: GUANAKO. Assessment :    Plan:  GUANAKO on CKD stage 4  Markedly distended bladder  Hyperkalemia  Mild Metabolic acidosis    New lung cancer w/ suspected mets to liver, pleura, left adrenal and mediastinal/hilar and retroperitoneal LN's    Pancreatitis Creatinine 2.5 to 3.8 to 3.3; obstructive component? Trend PVR on bladder scan - hold on rodriguez for now; re-eval urine sediment     K 5.5; will give veltassa    LD 1700    Sars-cov-2 pending           Subjective:   CHIEF COMPLAINT:  guanako    HISTORY OF PRESENT ILLNESS:     Lonnie Pantoja is a 64 y.o.   male who has a history of the below. Mr. Mike Moore is awake, alert. Not a great historian. Admitted with PNA/hypoxia, dizzines and abdominal pain. Covid r/o. Past Medical History:   Diagnosis Date    Hypotension     Vertigo       No past surgical history on file. Social History     Tobacco Use    Smoking status: Former Smoker   Substance Use Topics    Alcohol use: Yes     Comment: 2-3 cans of beer a day. No family history on file. No Known Allergies   Prior to Admission medications    Medication Sig Start Date End Date Taking? Authorizing Provider   amoxicillin-clavulanate (AUGMENTIN) 875-125 mg per tablet Take 1 Tab by mouth two (2) times a day.  2/15/20   Prince Hankins MD     REVIEW OF SYSTEMS:     [x]  Unable to obtain reliable ROS due to  [] mental status  [] sedated   [] intubated   [] Total of 12 systems reviewed as follows:  Constitutional: negative fever, negative chills, negative weight loss  Eyes:   negative visual changes  ENT:   negative sore throat, tongue or lip swelling  Respiratory:  negative cough, negative dyspnea  Cards:  negative for chest pain, palpitations, lower extremity edema  GI:   negative for nausea, vomiting, diarrhea, and abdominal pain  :  negative for frequency, dysuria  Integument:  negative for rash and pruritus  Heme:  negative for easy bruising and gum/nose bleeding  Musculoskel: negative for myalgias,  back pain and muscle weakness  Neuro:  negative for headaches, dizziness, vertigo  Psych:  negative for feelings of anxiety, depression   Travel?: none    Objective:   VITALS:    Visit Vitals  /75   Pulse 60   Temp 98 °F (36.7 °C)   Resp 16   SpO2 97%     PHYSICAL EXAM:  Gen:  [x]  WD [x]  WN  [] cachectic []  thin []  obese []  disheveled             []  ill apearing  []   Critical  []   Chronic    [x]  No acute distress    HEENT:   [x] NC/AT/PERRLA/EOMI    [] pink conjunctivae      [] pale conjunctivae                  PERRL  [] yes  [] no      [] moist mucosa    [] dry mucosa    hearing intact to voice [x] yes  [] No                 NECK:   supple [x] yes  [] no        masses [] yes  [] No               thyroid  []  non tender  []  tender    RESP:   [x] decreased bs's    [] rhonchi bilaterally - no dullness  [] wheezing   [] rhonchi   [] crackles     use of accessory muscles [] yes [] no    CARD:   [x]  regular rate and rhythm/No murmurs/rubs/gallops    murmur  [] yes ()  [] no      Rubs  [] yes  [] no       Gallops [] yes  [] no    Rate []  regular  []  irregular        carotid bruits  [] Right  []  Left                 LE edema [] yes  [x] no           JVP  []  yes   []  no    ABD:    [x] soft/ distended/non tender/+bowel sounds/no HSM    []  Rigid    tenderness [] yes [] no   Liver enlargement  []  yes []  no                Spleen enlargement  []  yes []  no     distended []  yes [] no     bowel sound  [] hypoactive   [] hyperactive    LYMPH:    Neck []  yes []  no       Axillae []  yes []  no    SKIN:   Rashes []  yes   [x]  no    Ulcers []  yes   []  no               [] tight to palpitation    skin turgor []  good  [] poor  [] decreased               Cyanosis/clubbing []  yes []  no    NEUR:   [x] cranial nerves II-XII grossly intact       [] Cranial nerves deficit                 []  facial droop    []  slurred speech   [] aphasic     [] Strength normal     []  weakness  []  LUE  []   RUE/ []  LLE  []   RLE    follows commands  []  yes []  no           PSYCH:   insight [] poor [] good   Alert and Oriented to  [x] person  [] place  []  time                    [] depressed [] anxious [] agitated  [] lethargic [] stuporous  [] sedated     LAB DATA REVIEWED:    Recent Labs     04/18/20 0415 04/17/20  1632   WBC 9.5 10.7   HGB 15.0 15.9   HCT 44.0 47.2    258     Recent Labs     04/18/20  0415 04/17/20  1632    136   K 5.5* 5.1    103   CO2 20* 22   BUN 92* 96*   CREA 3.31* 3.80*   * 129*   CA 8.3* 8.7   MG 3.0*  --    PHOS 3.8  --      Recent Labs     04/18/20 0415 04/17/20  1632   SGOT 388* 344*   * 259*   * 733*   TBILI 1.1* 1.2*   ALB 2.6* 2.8*   GLOB 4.0 4.0   LPSE >3,000* >3,000*     No results for input(s): INR, PTP, APTT, INREXT in the last 72 hours. No results for input(s): FE, TIBC, PSAT, FERR in the last 72 hours. No results for input(s): PH, PCO2, PO2 in the last 72 hours. Recent Labs     04/17/20  1632   *   CKMB 4.2*     Lab Results   Component Value Date/Time    Glucose (POC) 120 (H) 02/15/2020 11:33 AM    Glucose (POC) 101 (H) 02/15/2020 06:53 AM    Glucose (POC) 128 (H) 02/14/2020 09:50 PM    Glucose (POC) 171 (H) 02/14/2020 04:39 PM    Glucose (POC) 99 02/14/2020 11:26 AM       Procedures: see electronic medical records for all procedures/Xrays and details which were not copied into this note but were reviewed prior to creation of Plan.    ________________________________________________________________________       ___________________________________________________  Consulting Physician:  Pamela Hsieh, MD

## 2020-04-18 NOTE — PROGRESS NOTES
Dictated   [id number]    Complex pateint    Rec: iv fluids  rx the Hyperkalemia  Cortisol level  Ca 19-9  Check for chrnoic liver disase markers    Need primary tissue dx (? Liver or lung)    Lindsay Grey MD  2:49 PM  4/18/2020

## 2020-04-18 NOTE — PROGRESS NOTES
Hospitalist Progress Note    NAME: Felipa Lester   :  1960   MRN:  [id number]       Assessment / Plan:      Acute hypoxic respiratory failure, currently on 2L NC  Pneumonia  Rule out COVID-19  Right lung nodule likely malignancy with metastasis  Vertigo  -Admited to Respiratory Unit  -COVID-19 testing in progress  -Empiric abx for community-acquired pneumonia  -Follow up Cx results  -Continue O2 supplementation - currently off O2 and on room air  -Of note, pt likely has COPD, not officially diagnosed, and may have an O2 requirement at baseline. He is a former long time smoker, does not follow up with any medical appointments and does not take any medications. -CT Chest w/o contrast : Mediastinal, right hilar, and retroperitoneal lymphadenopathy. Right lower lobe mass lesion as well as postobstructive consolidation. Interstitial infiltrate is noted throughout the right upper lobe. We will consult pulmonology for possible bronc when he is more stable and for lung nodule work-up. Acute pancreatitis  Elevated LFTs  -CT abdomen:Hepatic metastases. Left adrenal masses concerning for  metastatic disease. Marked distention of the bladder with soft tissue nodule  involving the left bladder wall may represent an omental metastasis. -Repeated lipase> 3000k  -likely acute pancreatitis and metastasis  -CLD  -Ant-emetics PRN  -GI consulted on admission. Elevated troponin  Hx of newly diagnosed systolic HF in 2492  Hx of SVT  -repeated troponin 0.35 on admission, this morning 0.33  Patient is chest pain-free  EKG did not reveal acute ischemic changes   On admission patient reported that he does not want any further work-up for his heart. This morning again I talked to him and he does not want any cardiac work-up including cardiac consultation. He said\" I will consider that later and let you know\". Patient understands risk and benefits and he still does not wish further cardiology work-up.   His elevated troponin likely secondary to his worsening creatinine  -continue trending troponin  [facility] February 2020 revealed ejection fraction 25 to 30%. Also it showed moderate/grade 2 diastolic dysfunction  patient refused cardiac catheterization in February as it was documented in the recent discharge summaries     GUANAKO on CKD IV  -Pt missed his Nephrology follow up  -creatinine is improving this morning  -Avoid nephrotoxic drugs  -Gentle IV hydration  -Consult Nephrology if pt's renal function does not improve     Code Status: Full  Surrogate Decision Maker:  DVT Prophylaxis: Heparin  GI Prophylaxis: not indicated  Baseline: Independent, homeless     Subjective:     Chief Complaint / Reason for Physician Visit  Acute respiratory failure with hypoxia/elevated troponin  Discussed with RN events overnight. Review of Systems:  Symptom Y/N Comments  Symptom Y/N Comments   Fever/Chills n   Chest Pain n    Poor Appetite n   Edema     Cough n   Abdominal Pain y    Sputum n   Joint Pain     SOB/GRIFFIN n   Pruritis/Rash     Nausea/vomit    Tolerating PT/OT     Diarrhea n   Tolerating Diet     Constipation n   Other       Could NOT obtain due to:      Objective:     VITALS:   Last 24hrs VS reviewed since prior progress note. Most recent are:  Patient Vitals for the past 24 hrs:   Temp Pulse Resp BP SpO2   04/18/20 0419 98 °F (36.7 °C) 60 16 126/75 97 %   04/17/20 2315 97.9 °F (36.6 °C) 81 16 138/75 98 %   04/17/20 2011 97.6 °F (36.4 °C) (!) 101 16 108/82 98 %     No intake or output data in the 24 hours ending 04/18/20 1119     PHYSICAL EXAM:  General: WD, WN. Alert, cooperative, no acute distress    EENT:  EOMI. Anicteric sclerae. MMM  Resp:  CTA bilaterally, no wheezing or rales. No accessory muscle use  CV:  Regular  rhythm,  No edema  GI:  Soft, Non distended, Non tender.  +Bowel sounds  Neurologic:  Alert and oriented X 3, normal speech,   Psych:   Good insight. Not anxious nor agitated  Skin:  No rashes.   No jaundice    Reviewed most current lab test results and cultures  YES  Reviewed most current radiology test results   YES  Review and summation of old records today    NO  Reviewed patient's current orders and MAR    YES  PMH/SH reviewed - no change compared to H&P  ________________________________________________________________________  Care Plan discussed with:    Comments   Patient x    Family      RN x    Care Manager     Consultant                        Multidiciplinary team rounds were held today with , nursing, pharmacist and clinical coordinator. Patient's plan of care was discussed; medications were reviewed and discharge planning was addressed. ________________________________________________________________________  Total NON critical care TIME:  35   Minutes    Total CRITICAL CARE TIME Spent:   Minutes non procedure based      Comments   >50% of visit spent in counseling and coordination of care     ________________________________________________________________________  Zena Smith MD     Procedures: see electronic medical records for all procedures/Xrays and details which were not copied into this note but were reviewed prior to creation of Plan. LABS:  I reviewed today's most current labs and imaging studies.   Pertinent labs include:  Recent Labs     04/18/20  0415 04/17/20  1632   WBC 9.5 10.7   HGB 15.0 15.9   HCT 44.0 47.2    258     Recent Labs     04/18/20  0415 04/17/20  1632    136   K 5.5* 5.1    103   CO2 20* 22   * 129*   BUN 92* 96*   CREA 3.31* 3.80*   CA 8.3* 8.7   MG 3.0*  --    PHOS 3.8  --    ALB 2.6* 2.8*   TBILI 1.1* 1.2*   SGOT 388* 344*   * 259*       Signed: Zena Smith MD

## 2020-04-18 NOTE — PROGRESS NOTES
Attempted to see patient for OT evaluation after reviewing chart for updates and speaking with nursing. Soon after being cleared by nursing the patient became Tachycardic with HR as high as 200 on the monitor outside the room. Nursing and Hospitalist immediately entering room upon noting this to assess patient. OT evaluation will be deferred until Sunday or Monday, pending patient's medical status. Of note, nursing did report that patient stated he was ambulating with crutches prior to admit.

## 2020-04-18 NOTE — PROGRESS NOTES
RAPID RESPONSE TEAM    Responded to overhead rapid response to 2221 for tachycardia. Primary nurse reports pt -200's. Pt remained asymptomatic. Hx of SVT. Dr. Pauline Metzger at bedside. EKG obtained- sinus tach . Strips obtained from telemetry. Cardizem 10 mg IVP given. /77 s/p. Metoprolol 50 mg po bid.  cc bolus    Pt to remain in 2221.       Diane Kimbrough RN  RRT, [id number]

## 2020-04-18 NOTE — PROGRESS NOTES
PT order acknowledged and chart reviewed. Patient had a Rapid Response with HR in the 200s, just prior to PT getting ready to  entering room. Will defer assessment and follow up tomorrow.     Cheo Rodríguez, PT

## 2020-04-18 NOTE — ED PROVIDER NOTES
EMERGENCY DEPARTMENT HISTORY AND PHYSICAL EXAM    Date: 4/17/2020  Patient Name: Moise Sosa    History of Presenting Illness     Chief Complaint   Patient presents with    Shortness of Breath         History Provided By: Patient    Chief Complaint: pain left side  Duration: 2 Days  Timing:  Acute  Location: left lower rib cage area  Quality: Sharp  Severity: 10 out of 10  Modifying Factors: moving coughing worsens pain  Associated Symptoms: low back pain      HPI: Moise Sosa is a 64 y.o. male with a PMH of SVT pneumonia cardiomyopathy who presents with sided pain acute onset 2 days ago. Patient states he has been living in a hotel for the past week. Patient states he had been homeless. Patient states he has felt weak and short of breath which has made him fall. Patient states he fell on his back when he fell off the toilet and has pain in his left side from coughing. Patient denies taking medication for the pain. Also states he has had intermittent periods of dizziness on chart review patient had been admitted to [facility] in February and diagnosed with pneumonia and SVT. Patient also was advised to have a cardiac cath but he did declined. Patient has had no follow-up since his discharge from [facility]. PCP: None        Past History     Past Medical History:  Past Medical History:   Diagnosis Date    Hypotension     Vertigo        Past Surgical History:  History reviewed. No pertinent surgical history. Family History:  History reviewed. No pertinent family history. Social History:  Social History     Tobacco Use    Smoking status: Former Smoker   Substance Use Topics    Alcohol use: Yes     Comment: 2-3 cans of beer a day.  Drug use: No       Allergies:  No Known Allergies      Review of Systems   Review of Systems   Constitutional: Negative for chills, fatigue and fever. HENT: Negative for congestion and sore throat. Eyes: Negative for redness.    Respiratory: Positive for cough and shortness of breath. Negative for chest tightness and wheezing. Rib pain   Cardiovascular: Negative for chest pain. Gastrointestinal: Negative for abdominal pain. Genitourinary: Negative for dysuria. Musculoskeletal: Negative for arthralgias, back pain, myalgias, neck pain and neck stiffness. Skin: Negative for rash. Neurological: Positive for dizziness. Negative for syncope, weakness, light-headedness, numbness and headaches. All other systems reviewed and are negative. Physical Exam     Vitals:    04/17/20 1557 04/17/20 1606 04/17/20 1645 04/17/20 1700   BP:  (!) 135/115 111/68 115/59   Pulse:  (!) 133 (!) 117 (!) 108   Resp:  26 18 17   Temp:  97.8 °F (36.6 °C)     SpO2: (!) 87% 90%     Weight:  68 kg (150 lb)     Height:  5' 9\" (1.753 m)       Physical Exam  Vitals signs and nursing note reviewed. Constitutional:       Appearance: He is well-developed and normal weight. He is ill-appearing. HENT:      Head: Normocephalic and atraumatic. Right Ear: External ear normal.   Eyes:      General:         Right eye: No discharge. Left eye: No discharge. Conjunctiva/sclera: Conjunctivae normal.   Neck:      Musculoskeletal: Normal range of motion and neck supple. Cardiovascular:      Rate and Rhythm: Regular rhythm. Tachycardia present. Heart sounds: Normal heart sounds. Pulmonary:      Effort: Pulmonary effort is normal. No respiratory distress. Breath sounds: Examination of the right-lower field reveals decreased breath sounds. Decreased breath sounds present. No wheezing. Abdominal:      General: Bowel sounds are normal.      Palpations: Abdomen is soft. Tenderness: There is no abdominal tenderness. Musculoskeletal: Normal range of motion. Lymphadenopathy:      Cervical: No cervical adenopathy. Skin:     General: Skin is warm and dry. Neurological:      Mental Status: He is alert and oriented to person, place, and time. Cranial Nerves: No cranial nerve deficit. Psychiatric:         Behavior: Behavior normal.         Thought Content: Thought content normal.         Judgment: Judgment normal.           Diagnostic Study Results     Labs -     Recent Results (from the past 12 hour(s))   CBC WITH AUTOMATED DIFF    Collection Time: 04/17/20  4:32 PM   Result Value Ref Range    WBC 10.7 4.1 - 11.1 K/uL    RBC 4.62 4.10 - 5.70 M/uL    HGB 15.9 12.1 - 17.0 g/dL    HCT 47.2 36.6 - 50.3 %    .2 (H) 80.0 - 99.0 FL    MCH 34.4 (H) 26.0 - 34.0 PG    MCHC 33.7 30.0 - 36.5 g/dL    RDW 14.3 11.5 - 14.5 %    PLATELET 914 138 - 989 K/uL    MPV 10.4 8.9 - 12.9 FL    NRBC 0.0 0  WBC    ABSOLUTE NRBC 0.00 0.00 - 0.01 K/uL    NEUTROPHILS 87 (H) 32 - 75 %    LYMPHOCYTES 4 (L) 12 - 49 %    MONOCYTES 8 5 - 13 %    EOSINOPHILS 0 0 - 7 %    BASOPHILS 0 0 - 1 %    IMMATURE GRANULOCYTES 1 (H) 0.0 - 0.5 %    ABS. NEUTROPHILS 9.3 (H) 1.8 - 8.0 K/UL    ABS. LYMPHOCYTES 0.4 (L) 0.8 - 3.5 K/UL    ABS. MONOCYTES 0.9 0.0 - 1.0 K/UL    ABS. EOSINOPHILS 0.0 0.0 - 0.4 K/UL    ABS. BASOPHILS 0.0 0.0 - 0.1 K/UL    ABS. IMM. GRANS. 0.1 (H) 0.00 - 0.04 K/UL    DF SMEAR SCANNED      RBC COMMENTS NORMOCYTIC, NORMOCHROMIC     METABOLIC PANEL, COMPREHENSIVE    Collection Time: 04/17/20  4:32 PM   Result Value Ref Range    Sodium 136 136 - 145 mmol/L    Potassium 5.1 3.5 - 5.1 mmol/L    Chloride 103 97 - 108 mmol/L    CO2 22 21 - 32 mmol/L    Anion gap 11 5 - 15 mmol/L    Glucose 129 (H) 65 - 100 mg/dL    BUN 96 (H) 6 - 20 MG/DL    Creatinine 3.80 (H) 0.70 - 1.30 MG/DL    BUN/Creatinine ratio 25 (H) 12 - 20      GFR est AA 20 (L) >60 ml/min/1.73m2    GFR est non-AA 17 (L) >60 ml/min/1.73m2    Calcium 8.7 8.5 - 10.1 MG/DL    Bilirubin, total 1.2 (H) 0.2 - 1.0 MG/DL    ALT (SGPT) 259 (H) 12 - 78 U/L    AST (SGOT) 344 (H) 15 - 37 U/L    Alk.  phosphatase 733 (H) 45 - 117 U/L    Protein, total 6.8 6.4 - 8.2 g/dL    Albumin 2.8 (L) 3.5 - 5.0 g/dL    Globulin 4.0 2.0 - 4.0 g/dL    A-G Ratio 0.7 (L) 1.1 - 2.2     TROPONIN I    Collection Time: 04/17/20  4:32 PM   Result Value Ref Range    Troponin-I, Qt. 0.35 (H) <0.05 ng/mL   CK W/ CKMB & INDEX    Collection Time: 04/17/20  4:32 PM   Result Value Ref Range    CK - MB 4.2 (H) <3.6 NG/ML    CK-MB Index 1.2 0.0 - 2.5       (H) 39 - 308 U/L   LACTIC ACID    Collection Time: 04/17/20  4:32 PM   Result Value Ref Range    Lactic acid 4.2 (HH) 0.4 - 2.0 MMOL/L   LIPASE    Collection Time: 04/17/20  4:32 PM   Result Value Ref Range    Lipase >3,000 (H) 73 - 393 U/L   ETHYL ALCOHOL    Collection Time: 04/17/20  4:32 PM   Result Value Ref Range    ALCOHOL(ETHYL),SERUM <10 <10 MG/DL       Radiologic Studies -   XR KNEE LT 3 V   Final Result   IMPRESSION: Superficial prepatellar and infrapatellar soft tissue swelling. No   evidence of fracture or dislocation. XR CHEST PORT   Final Result   Impression: Stable right infrahilar opacity is concerning for underlying   neoplasm. Potential right paratracheal lymphadenopathy. Correlation with chest   CT is recommended following resolution of the COVID 19 status. CT CHEST WO CONT    (Results Pending)   CT ABD PELV WO CONT    (Results Pending)     CT Results  (Last 48 hours)    None        CXR Results  (Last 48 hours)               04/17/20 1708  XR CHEST PORT Final result    Impression:  Impression: Stable right infrahilar opacity is concerning for underlying   neoplasm. Potential right paratracheal lymphadenopathy. Correlation with chest   CT is recommended following resolution of the COVID 19 status. Narrative: Indication: Shortness of breath, COVID 19       Comparison: 2/11/2020       Portable exam of the chest obtained at 1632 demonstrates normal heart size. There is no substantial change in the right infrahilar opacity, concerning for   underlying neoplasm. Left lung is clear. Fullness in the right paratracheal   region is concerning for lymphadenopathy. The osseous structures are   unremarkable. Medical Decision Making   I am the first provider for this patient. I reviewed the vital signs, available nursing notes, past medical history, past surgical history, family history and social history. Vital Signs-Reviewed the patient's vital signs. Records Reviewed: Nursing Notes, Old Medical Records and Previous Radiology Studies  ED Course as of Apr 17 2013 Fri Apr 17, 2020   1744 Call placed to transfer center ED [facility] hospitalist    [AN]   [id number] CONSULT NOTE:     I spoke with Dr Kvng Tapia,   Specialty: Hospitalist  Discussed pt's hx, disposition, and available diagnostic and imaging results. [AN]   [id number] Patient is being transferred to  , transfer accepted by Dr Kvng Tapia. The reasons for their transfer have been discussed with them and available family. They convey agreement and understanding for the need to be transferred as explained to them by this provider.     [AN]      ED Course User Index  [AN] Becky Manriquez, BERHANE      Ct abd pelvis chest without contrast ordered per Dr Kvng Tapia. Troponin ordered for 2030. Discharge Medication List as of 4/17/2020  6:54 PM      CRITICAL CARE NOTE :          IMPENDING DETERIORATION -Respiratory and Cardiovascular    ASSOCIATED RISK FACTORS - Hypotension, Shock, Hypoxia, Dysrhythmia and Metabolic changes    MANAGEMENT- Supervision of Care and Transfer    INTERPRETATION -  Xrays and Blood Pressure    INTERVENTIONS - hemodynamic mngmt and Metobolic interventions    CASE REVIEW - Hospitalist    TREATMENT RESPONSE -Stable    PERFORMED BY - Mid-Level Bethanie Holstein Dr Chloe Meyer        NOTES   :      I have spent 45 minutes of critical care time involved in lab review, consultations with specialist, family decision- making, bedside attention and documentation. During this entire length of time I was immediately available to the patient .     Joaquín Gómez, NP                                                Provider Notes (Medical Decision Making):   DDX pneumonia neoplasm hypoxia Covid 19 bronchitis CKD  Procedures:  Procedures    Please note that this dictation was completed with Dragon, computer voice recognition software. Quite often unanticipated grammatical, syntax, homophones, and other interpretive errors are inadvertently transcribed by the computer software. Please disregard these errors. Additionally, please excuse any errors that have escaped final proofreading. Diagnosis     Clinical Impression:   1. Acute pancreatitis, unspecified complication status, unspecified pancreatitis type    2. Suspected Covid-19 Virus Infection    3. Acute renal failure, unspecified acute renal failure type ([address]    4. Pneumonia of right lower lobe due to infectious organism ([address]    5.  Acute respiratory failure with hypoxia ([address]

## 2020-04-18 NOTE — PROGRESS NOTES
1330: Patient's heart rate got to the 180's-200's on the heart monitor. Patient stated no chest pain and a mild flutter feeling in his chest. Rapid response called. EKG obtained.

## 2020-04-18 NOTE — PROGRESS NOTES
[facility]  Pulmonary, Critical Care, and Sleep Medicine    Name: Kartik Han MRN: [id number]   : 1960 Hospital: [facility]   Date: 2020        Critical Care Initial Patient Consult    IMPRESSION:   · COVID Rule out, Bronchs not being done at this point for now emergent issues. · No overt Pneumonia. Does have a lung mass. · Pox was normal this am.   · Suspected Lung Cancer, liver mets? Adrenal Masses, LAD. · Acute on Chronic Renal failure  · Smoker most likely has COPD. · Bladder lesion  · Systolic CHF: based on echo from 20: LVEF of 04%, Diastolic Dysfunction, Mild Pulmonary Htn, Moderate Mitral Regurg. · SVT, may need cards eval. (was given Metoprolol, Cardizem) will have to watch for hypotension. (Not sure he is stable for any further evaluation with out cards eval., Increase TnI, SVT)  · Normal CBC,   · Na: 136  · K: 5.5  · Cr of 3.31  · Increased LFTs  · Etoh use, at lease 2-3 beers per day. · Lipase over 3K, suggest pancreatitis, liver mets, Reviewed note of  Dr. Camilla Dowling. · Discussed with Dr. Deanna Ly, nurse. · Homeless, but has been living in a hotel. · Discussed with nurse and Dr. Deanna Ly. RECOMMENDATIONS:   · Would agree with liver bx, as first step. This would give pathologic dx and staging information  · Need to rule out COVID disease. · May need cards for SVT and cardiac risk stratification. · Ruling out COVID Pneumonia  · Will follow with you. Subjective/History: This patient has been seen and evaluated at the request of Dr. Denny Ohara for above. Patient is a 64 y.o. male   Who presents for above. Pt seems to be a marginal hx. He seems to give different reports to different providers. He was hospitalized at [facility]. Has a chronic cough and vertigo. Has been with weakness of generalized abdominal pain. Has been ongoing for above 6 weeks.        Past Medical History:   Diagnosis Date    Hypotension     Vertigo No past surgical history on file. Prior to Admission medications    Medication Sig Start Date End Date Taking? Authorizing Provider   amoxicillin-clavulanate (AUGMENTIN) 875-125 mg per tablet Take 1 Tab by mouth two (2) times a day. 2/15/20   Kris Bustos MD     Current Facility-Administered Medications   Medication Dose Route Frequency    sodium chloride (NS) flush 5-40 mL  5-40 mL IntraVENous Q8H    cefTRIAXone (ROCEPHIN) 1 g in 0.9% sodium chloride (MBP/ADV) 50 mL  1 g IntraVENous Q24H    docusate sodium (COLACE) capsule 100 mg  100 mg Oral BID    heparin (porcine) injection 5,000 Units  5,000 Units SubCUTAneous Q8H    azithromycin (ZITHROMAX) 500 mg in 0.9% sodium chloride (MBP/ADV) 250 mL  500 mg IntraVENous Q24H     No Known Allergies   Social History     Tobacco Use    Smoking status: Former Smoker   Substance Use Topics    Alcohol use: Yes     Comment: 2-3 cans of beer a day. No family history on file. Review of Systems:  A comprehensive review of systems was negative. Objective:   Vital Signs:    Visit Vitals  /75   Pulse 60   Temp 98 °F (36.7 °C)   Resp 16   SpO2 97%       O2 Device: Nasal cannula   O2 Flow Rate (L/min): 2 l/min   Temp (24hrs), Av.9 °F (36.6 °C), Min:97.6 °F (36.4 °C), Max:98 °F (36.7 °C)       Intake/Output:   Last shift:      No intake/output data recorded. Last 3 shifts: No intake/output data recorded. No intake or output data in the 24 hours ending 20 1100  Hemodynamics:   PAP:   CO:     Wedge:   CI:     CVP:    SVR:       PVR:       Ventilator Settings:  Mode Rate Tidal Volume Pressure FiO2 PEEP                    Peak airway pressure:      Minute ventilation:        Physical Exam:    General:  Alert, cooperative, no distress, appears stated age. Head:  Normocephalic, without obvious abnormality, atraumatic. Eyes:  Conjunctivae/corneas clear. PERRL, EOMs intact. Nose: Nares normal. Septum midline.  Mucosa normal. No drainage or sinus tenderness. Throat: Lips, mucosa, and tongue normal. Teeth and gums normal.   Neck: Supple, symmetrical, trachea midline, no adenopathy, thyroid: no enlargment/tenderness/nodules, no carotid bruit and no JVD. Back:   Symmetric, no curvature. ROM normal.   Lungs:   Clear to auscultation bilaterally. Chest wall:  No tenderness or deformity. Heart:  Regular rate and rhythm, S1, S2 normal, no murmur, click, rub or gallop. Abdomen:   Soft, tender over the epigasrum . Bowel sounds normal. No masses,  No organomegaly. Extremities: Extremities normal, atraumatic, no cyanosis or edema. Pulses: 2+ and symmetric all extremities. Skin: Skin color, texture, turgor normal. No rashes or lesions   Lymph nodes: Cervical, supraclavicular, and axillary nodes normal.   Neurologic: Grossly nonfocal, motor seems to be intact. Psych NO over anxiety or depression. Data:     Recent Results (from the past 24 hour(s))   CBC WITH AUTOMATED DIFF    Collection Time: 04/17/20  4:32 PM   Result Value Ref Range    WBC 10.7 4.1 - 11.1 K/uL    RBC 4.62 4.10 - 5.70 M/uL    HGB 15.9 12.1 - 17.0 g/dL    HCT 47.2 36.6 - 50.3 %    .2 (H) 80.0 - 99.0 FL    MCH 34.4 (H) 26.0 - 34.0 PG    MCHC 33.7 30.0 - 36.5 g/dL    RDW 14.3 11.5 - 14.5 %    PLATELET 079 885 - 985 K/uL    MPV 10.4 8.9 - 12.9 FL    NRBC 0.0 0  WBC    ABSOLUTE NRBC 0.00 0.00 - 0.01 K/uL    NEUTROPHILS 87 (H) 32 - 75 %    LYMPHOCYTES 4 (L) 12 - 49 %    MONOCYTES 8 5 - 13 %    EOSINOPHILS 0 0 - 7 %    BASOPHILS 0 0 - 1 %    IMMATURE GRANULOCYTES 1 (H) 0.0 - 0.5 %    ABS. NEUTROPHILS 9.3 (H) 1.8 - 8.0 K/UL    ABS. LYMPHOCYTES 0.4 (L) 0.8 - 3.5 K/UL    ABS. MONOCYTES 0.9 0.0 - 1.0 K/UL    ABS. EOSINOPHILS 0.0 0.0 - 0.4 K/UL    ABS. BASOPHILS 0.0 0.0 - 0.1 K/UL    ABS. IMM.  GRANS. 0.1 (H) 0.00 - 0.04 K/UL    DF SMEAR SCANNED      RBC COMMENTS NORMOCYTIC, NORMOCHROMIC     METABOLIC PANEL, COMPREHENSIVE    Collection Time: 04/17/20  4:32 PM   Result Value Ref Range    Sodium 136 136 - 145 mmol/L    Potassium 5.1 3.5 - 5.1 mmol/L    Chloride 103 97 - 108 mmol/L    CO2 22 21 - 32 mmol/L    Anion gap 11 5 - 15 mmol/L    Glucose 129 (H) 65 - 100 mg/dL    BUN 96 (H) 6 - 20 MG/DL    Creatinine 3.80 (H) 0.70 - 1.30 MG/DL    BUN/Creatinine ratio 25 (H) 12 - 20      GFR est AA 20 (L) >60 ml/min/1.73m2    GFR est non-AA 17 (L) >60 ml/min/1.73m2    Calcium 8.7 8.5 - 10.1 MG/DL    Bilirubin, total 1.2 (H) 0.2 - 1.0 MG/DL    ALT (SGPT) 259 (H) 12 - 78 U/L    AST (SGOT) 344 (H) 15 - 37 U/L    Alk.  phosphatase 733 (H) 45 - 117 U/L    Protein, total 6.8 6.4 - 8.2 g/dL    Albumin 2.8 (L) 3.5 - 5.0 g/dL    Globulin 4.0 2.0 - 4.0 g/dL    A-G Ratio 0.7 (L) 1.1 - 2.2     TROPONIN I    Collection Time: 04/17/20  4:32 PM   Result Value Ref Range    Troponin-I, Qt. 0.35 (H) <0.05 ng/mL   CK W/ CKMB & INDEX    Collection Time: 04/17/20  4:32 PM   Result Value Ref Range    CK - MB 4.2 (H) <3.6 NG/ML    CK-MB Index 1.2 0.0 - 2.5       (H) 39 - 308 U/L   LACTIC ACID    Collection Time: 04/17/20  4:32 PM   Result Value Ref Range    Lactic acid 4.2 (HH) 0.4 - 2.0 MMOL/L   LIPASE    Collection Time: 04/17/20  4:32 PM   Result Value Ref Range    Lipase >3,000 (H) 73 - 393 U/L   ETHYL ALCOHOL    Collection Time: 04/17/20  4:32 PM   Result Value Ref Range    ALCOHOL(ETHYL),SERUM <10 <10 MG/DL   CULTURE, BLOOD    Collection Time: 04/17/20  5:19 PM   Result Value Ref Range    Special Requests: NO SPECIAL REQUESTS      Culture result: NO GROWTH AFTER 7 HOURS     CULTURE, BLOOD    Collection Time: 04/17/20  5:29 PM   Result Value Ref Range    Special Requests: NO SPECIAL REQUESTS      Culture result: NO GROWTH AFTER 7 HOURS     EKG, 12 LEAD, INITIAL    Collection Time: 04/17/20  8:18 PM   Result Value Ref Range    Ventricular Rate 111 BPM    Atrial Rate 111 BPM    P-R Interval 128 ms    QRS Duration 80 ms    Q-T Interval 354 ms    QTC Calculation (Bezet) 481 ms    Calculated P Axis 74 degrees Calculated R Axis -67 degrees    Calculated T Axis 78 degrees    Diagnosis       Sinus tachycardia with premature atrial complexes in a pattern of bigeminy  Left anterior fascicular block  Inferior infarct (cited on or before 10-FEB-2020)  Cannot rule out Anterior infarct , age undetermined  Abnormal ECG  When compared with ECG of 10-FEB-2020 23:05,  premature ventricular complexes are no longer present  premature atrial complexes are now present  Left anterior fascicular block is now present  Nonspecific T wave abnormality now evident in Inferior leads     TROPONIN I    Collection Time: 04/17/20  8:26 PM   Result Value Ref Range    Troponin-I, Qt. 0.33 (H) <0.05 ng/mL   SARS-COV-2    Collection Time: 04/17/20  8:26 PM   Result Value Ref Range    Specimen source Nasopharyngeal      SARS-CoV-2 PENDING     SARS-CoV-2 PENDING     SARS-CoV-2 PENDING     COVID-19 PENDING NEG   METABOLIC PANEL, COMPREHENSIVE    Collection Time: 04/18/20  4:15 AM   Result Value Ref Range    Sodium 136 136 - 145 mmol/L    Potassium 5.5 (H) 3.5 - 5.1 mmol/L    Chloride 108 97 - 108 mmol/L    CO2 20 (L) 21 - 32 mmol/L    Anion gap 8 5 - 15 mmol/L    Glucose 121 (H) 65 - 100 mg/dL    BUN 92 (H) 6 - 20 MG/DL    Creatinine 3.31 (H) 0.70 - 1.30 MG/DL    BUN/Creatinine ratio 28 (H) 12 - 20      GFR est AA 23 (L) >60 ml/min/1.73m2    GFR est non-AA 19 (L) >60 ml/min/1.73m2    Calcium 8.3 (L) 8.5 - 10.1 MG/DL    Bilirubin, total 1.1 (H) 0.2 - 1.0 MG/DL    ALT (SGPT) 254 (H) 12 - 78 U/L    AST (SGOT) 388 (H) 15 - 37 U/L    Alk.  phosphatase 696 (H) 45 - 117 U/L    Protein, total 6.6 6.4 - 8.2 g/dL    Albumin 2.6 (L) 3.5 - 5.0 g/dL    Globulin 4.0 2.0 - 4.0 g/dL    A-G Ratio 0.7 (L) 1.1 - 2.2     MAGNESIUM    Collection Time: 04/18/20  4:15 AM   Result Value Ref Range    Magnesium 3.0 (H) 1.6 - 2.4 mg/dL   PHOSPHORUS    Collection Time: 04/18/20  4:15 AM   Result Value Ref Range    Phosphorus 3.8 2.6 - 4.7 MG/DL   CBC WITH AUTOMATED DIFF Collection Time: 04/18/20  4:15 AM   Result Value Ref Range    WBC 9.5 4.1 - 11.1 K/uL    RBC 4.39 4. 10 - 5.70 M/uL    HGB 15.0 12.1 - 17.0 g/dL    HCT 44.0 36.6 - 50.3 %    .2 (H) 80.0 - 99.0 FL    MCH 34.2 (H) 26.0 - 34.0 PG    MCHC 34.1 30.0 - 36.5 g/dL    RDW 14.4 11.5 - 14.5 %    PLATELET 260 647 - 017 K/uL    MPV 10.5 8.9 - 12.9 FL    NRBC 0.0 0  WBC    ABSOLUTE NRBC 0.00 0.00 - 0.01 K/uL    NEUTROPHILS 85 (H) 32 - 75 %    LYMPHOCYTES 5 (L) 12 - 49 %    MONOCYTES 9 5 - 13 %    EOSINOPHILS 0 0 - 7 %    BASOPHILS 0 0 - 1 %    IMMATURE GRANULOCYTES 1 (H) 0.0 - 0.5 %    ABS. NEUTROPHILS 8.0 1.8 - 8.0 K/UL    ABS. LYMPHOCYTES 0.5 (L) 0.8 - 3.5 K/UL    ABS. MONOCYTES 0.9 0.0 - 1.0 K/UL    ABS. EOSINOPHILS 0.0 0.0 - 0.4 K/UL    ABS. BASOPHILS 0.0 0.0 - 0.1 K/UL    ABS. IMM.  GRANS. 0.1 (H) 0.00 - 0.04 K/UL    DF AUTOMATED      RBC COMMENTS MACROCYTOSIS  2+       LIPASE    Collection Time: 04/18/20  4:15 AM   Result Value Ref Range    Lipase >3,000 (H) 73 - 393 U/L   LD    Collection Time: 04/18/20  4:15 AM   Result Value Ref Range    LD 1,698 (H) 85 - 241 U/L   PROCALCITONIN    Collection Time: 04/18/20  4:15 AM   Result Value Ref Range    Procalcitonin 35.04 ng/mL   D DIMER    Collection Time: 04/18/20  4:15 AM   Result Value Ref Range    D-dimer 11.32 (H) 0.00 - 0.65 mg/L FEU             Telemetry:Sinus tachycardia with premature atrial complexes in a pattern of bigeminy   Left anterior fascicular block   Inferior infarct (cited on or before 10-FEB-2020)   Cannot rule out Anterior infarct , age undetermined   Abnormal ECG   When compared with ECG of 10-FEB-2020 23:05,   premature ventricular complexes are no longer present   premature atrial complexes are now present   Left anterior fascicular block is now present   Nonspecific T wave abnormality now evident in Inferior leads     Imaging:  I have personally reviewed the patients radiographs and have reviewed the reports:  4-17-20: IMPRESSION:  Mediastinal, right hilar, and retroperitoneal lymphadenopathy. Right lower lobe  mass lesion as well as postobstructive consolidation. Interstitial infiltrate is  noted throughout the right upper lobe. Pleural-based mass lesions throughout the  right upper lobe. Hepatic metastases. Left adrenal masses concerning for  metastatic disease. Marked distention of the bladder with soft tissue nodule  involving the left bladder wall may represent an omental metastasis. 4-17-20: CXR: is consistent with prior lung mass. NO acute or new infiltrates.       Megan Veras MD

## 2020-04-19 LAB
ALBUMIN SERPL-MCNC: 2.6 G/DL (ref 3.5–5)
ALBUMIN/GLOB SERPL: 0.7 {RATIO} (ref 1.1–2.2)
ALP SERPL-CCNC: 684 U/L (ref 45–117)
ALT SERPL-CCNC: 257 U/L (ref 12–78)
ANION GAP SERPL CALC-SCNC: 10 MMOL/L (ref 5–15)
AST SERPL-CCNC: 404 U/L (ref 15–37)
ATRIAL RATE: 120 BPM
BASOPHILS # BLD: 0 K/UL (ref 0–0.1)
BASOPHILS NFR BLD: 0 % (ref 0–1)
BILIRUB SERPL-MCNC: 1.1 MG/DL (ref 0.2–1)
BUN SERPL-MCNC: 85 MG/DL (ref 6–20)
BUN/CREAT SERPL: 27 (ref 12–20)
CALCIUM SERPL-MCNC: 8.6 MG/DL (ref 8.5–10.1)
CALCULATED P AXIS, ECG09: 65 DEGREES
CALCULATED R AXIS, ECG10: -56 DEGREES
CALCULATED T AXIS, ECG11: 76 DEGREES
CHLORIDE SERPL-SCNC: 107 MMOL/L (ref 97–108)
CHOLEST SERPL-MCNC: 149 MG/DL
CO2 SERPL-SCNC: 18 MMOL/L (ref 21–32)
CORTIS AM PEAK SERPL-MCNC: 29.4 UG/DL (ref 4.3–22.45)
CREAT SERPL-MCNC: 3.19 MG/DL (ref 0.7–1.3)
DIAGNOSIS, 93000: NORMAL
DIFFERENTIAL METHOD BLD: ABNORMAL
EOSINOPHIL # BLD: 0 K/UL (ref 0–0.4)
EOSINOPHIL NFR BLD: 0 % (ref 0–7)
ERYTHROCYTE [DISTWIDTH] IN BLOOD BY AUTOMATED COUNT: 14.8 % (ref 11.5–14.5)
FERRITIN SERPL-MCNC: 1043 NG/ML (ref 26–388)
GLOBULIN SER CALC-MCNC: 3.8 G/DL (ref 2–4)
GLUCOSE SERPL-MCNC: 94 MG/DL (ref 65–100)
HCT VFR BLD AUTO: 42.2 % (ref 36.6–50.3)
HDLC SERPL-MCNC: 24 MG/DL
HDLC SERPL: 6.2 {RATIO} (ref 0–5)
HGB BLD-MCNC: 14.1 G/DL (ref 12.1–17)
IMM GRANULOCYTES # BLD AUTO: 0.1 K/UL (ref 0–0.04)
IMM GRANULOCYTES NFR BLD AUTO: 1 % (ref 0–0.5)
IRON SATN MFR SERPL: 34 % (ref 20–50)
IRON SERPL-MCNC: 62 UG/DL (ref 35–150)
LDLC SERPL CALC-MCNC: 98.6 MG/DL (ref 0–100)
LIPID PROFILE,FLP: ABNORMAL
LYMPHOCYTES # BLD: 0.6 K/UL (ref 0.8–3.5)
LYMPHOCYTES NFR BLD: 6 % (ref 12–49)
MAGNESIUM SERPL-MCNC: 2.9 MG/DL (ref 1.6–2.4)
MCH RBC QN AUTO: 34.1 PG (ref 26–34)
MCHC RBC AUTO-ENTMCNC: 33.4 G/DL (ref 30–36.5)
MCV RBC AUTO: 102.2 FL (ref 80–99)
MONOCYTES # BLD: 0.8 K/UL (ref 0–1)
MONOCYTES NFR BLD: 9 % (ref 5–13)
NEUTS SEG # BLD: 7.8 K/UL (ref 1.8–8)
NEUTS SEG NFR BLD: 84 % (ref 32–75)
NRBC # BLD: 0 K/UL (ref 0–0.01)
NRBC BLD-RTO: 0 PER 100 WBC
P-R INTERVAL, ECG05: 118 MS
PHOSPHATE SERPL-MCNC: 3.3 MG/DL (ref 2.6–4.7)
PLATELET # BLD AUTO: 214 K/UL (ref 150–400)
PMV BLD AUTO: 10.5 FL (ref 8.9–12.9)
POTASSIUM SERPL-SCNC: 5.3 MMOL/L (ref 3.5–5.1)
PROT SERPL-MCNC: 6.4 G/DL (ref 6.4–8.2)
Q-T INTERVAL, ECG07: 328 MS
QRS DURATION, ECG06: 88 MS
QTC CALCULATION (BEZET), ECG08: 463 MS
RBC # BLD AUTO: 4.13 M/UL (ref 4.1–5.7)
SODIUM SERPL-SCNC: 135 MMOL/L (ref 136–145)
TIBC SERPL-MCNC: 183 UG/DL (ref 250–450)
TRIGL SERPL-MCNC: 132 MG/DL (ref ?–150)
VENTRICULAR RATE, ECG03: 120 BPM
VLDLC SERPL CALC-MCNC: 26.4 MG/DL
WBC # BLD AUTO: 9.4 K/UL (ref 4.1–11.1)

## 2020-04-19 PROCEDURE — 80061 LIPID PANEL: CPT

## 2020-04-19 PROCEDURE — 74011250637 HC RX REV CODE- 250/637: Performed by: GENERAL ACUTE CARE HOSPITAL

## 2020-04-19 PROCEDURE — 36415 COLL VENOUS BLD VENIPUNCTURE: CPT

## 2020-04-19 PROCEDURE — 84165 PROTEIN E-PHORESIS SERUM: CPT

## 2020-04-19 PROCEDURE — 83735 ASSAY OF MAGNESIUM: CPT

## 2020-04-19 PROCEDURE — 74011000250 HC RX REV CODE- 250: Performed by: INTERNAL MEDICINE

## 2020-04-19 PROCEDURE — 86038 ANTINUCLEAR ANTIBODIES: CPT

## 2020-04-19 PROCEDURE — 83516 IMMUNOASSAY NONANTIBODY: CPT

## 2020-04-19 PROCEDURE — 74011250637 HC RX REV CODE- 250/637: Performed by: INTERNAL MEDICINE

## 2020-04-19 PROCEDURE — 80053 COMPREHEN METABOLIC PANEL: CPT

## 2020-04-19 PROCEDURE — 82533 TOTAL CORTISOL: CPT

## 2020-04-19 PROCEDURE — 65660000000 HC RM CCU STEPDOWN

## 2020-04-19 PROCEDURE — 97530 THERAPEUTIC ACTIVITIES: CPT | Performed by: OCCUPATIONAL THERAPIST

## 2020-04-19 PROCEDURE — 97165 OT EVAL LOW COMPLEX 30 MIN: CPT | Performed by: OCCUPATIONAL THERAPIST

## 2020-04-19 PROCEDURE — 82728 ASSAY OF FERRITIN: CPT

## 2020-04-19 PROCEDURE — 74011000258 HC RX REV CODE- 258: Performed by: GENERAL ACUTE CARE HOSPITAL

## 2020-04-19 PROCEDURE — 97162 PT EVAL MOD COMPLEX 30 MIN: CPT

## 2020-04-19 PROCEDURE — 74011250636 HC RX REV CODE- 250/636: Performed by: GENERAL ACUTE CARE HOSPITAL

## 2020-04-19 PROCEDURE — 97530 THERAPEUTIC ACTIVITIES: CPT

## 2020-04-19 PROCEDURE — 83540 ASSAY OF IRON: CPT

## 2020-04-19 PROCEDURE — 94760 N-INVAS EAR/PLS OXIMETRY 1: CPT

## 2020-04-19 PROCEDURE — 80074 ACUTE HEPATITIS PANEL: CPT

## 2020-04-19 PROCEDURE — 84100 ASSAY OF PHOSPHORUS: CPT

## 2020-04-19 PROCEDURE — 82103 ALPHA-1-ANTITRYPSIN TOTAL: CPT

## 2020-04-19 PROCEDURE — 74011000258 HC RX REV CODE- 258: Performed by: INTERNAL MEDICINE

## 2020-04-19 PROCEDURE — 82390 ASSAY OF CERULOPLASMIN: CPT

## 2020-04-19 PROCEDURE — 85025 COMPLETE CBC W/AUTO DIFF WBC: CPT

## 2020-04-19 PROCEDURE — 86301 IMMUNOASSAY TUMOR CA 19-9: CPT

## 2020-04-19 RX ORDER — TAMSULOSIN HYDROCHLORIDE 0.4 MG/1
0.4 CAPSULE ORAL DAILY
Status: DISCONTINUED | OUTPATIENT
Start: 2020-04-19 | End: 2020-04-24 | Stop reason: HOSPADM

## 2020-04-19 RX ADMIN — HYDROCODONE BITARTRATE AND ACETAMINOPHEN 1 TABLET: 5; 325 TABLET ORAL at 03:43

## 2020-04-19 RX ADMIN — THIAMINE HCL TAB 100 MG 100 MG: 100 TAB at 08:45

## 2020-04-19 RX ADMIN — METOPROLOL TARTRATE 50 MG: 50 TABLET, FILM COATED ORAL at 17:12

## 2020-04-19 RX ADMIN — CEFTRIAXONE 1 G: 1 INJECTION, POWDER, FOR SOLUTION INTRAMUSCULAR; INTRAVENOUS at 21:44

## 2020-04-19 RX ADMIN — HEPARIN SODIUM 5000 UNITS: 5000 INJECTION INTRAVENOUS; SUBCUTANEOUS at 13:58

## 2020-04-19 RX ADMIN — HEPARIN SODIUM 5000 UNITS: 5000 INJECTION INTRAVENOUS; SUBCUTANEOUS at 04:00

## 2020-04-19 RX ADMIN — Medication 1 CAPSULE: at 08:45

## 2020-04-19 RX ADMIN — Medication 10 ML: at 23:24

## 2020-04-19 RX ADMIN — SODIUM BICARBONATE: 84 INJECTION, SOLUTION INTRAVENOUS at 08:50

## 2020-04-19 RX ADMIN — HYDROCODONE BITARTRATE AND ACETAMINOPHEN 1 TABLET: 5; 325 TABLET ORAL at 21:43

## 2020-04-19 RX ADMIN — HYDROCODONE BITARTRATE AND ACETAMINOPHEN 1 TABLET: 5; 325 TABLET ORAL at 08:45

## 2020-04-19 RX ADMIN — DOCUSATE SODIUM 100 MG: 100 CAPSULE, LIQUID FILLED ORAL at 17:12

## 2020-04-19 RX ADMIN — TAMSULOSIN HYDROCHLORIDE 0.4 MG: 0.4 CAPSULE ORAL at 08:45

## 2020-04-19 RX ADMIN — DOXYCYCLINE HYCLATE 100 MG: 100 TABLET, COATED ORAL at 08:45

## 2020-04-19 RX ADMIN — THERA TABS 1 TABLET: TAB at 08:47

## 2020-04-19 RX ADMIN — DOCUSATE SODIUM 100 MG: 100 CAPSULE, LIQUID FILLED ORAL at 08:45

## 2020-04-19 RX ADMIN — HYDROCODONE BITARTRATE AND ACETAMINOPHEN 1 TABLET: 5; 325 TABLET ORAL at 17:16

## 2020-04-19 RX ADMIN — DOXYCYCLINE HYCLATE 100 MG: 100 TABLET, COATED ORAL at 21:44

## 2020-04-19 RX ADMIN — ZINC SULFATE 220 MG (50 MG) CAPSULE 1 CAPSULE: CAPSULE at 08:45

## 2020-04-19 RX ADMIN — METOPROLOL TARTRATE 50 MG: 50 TABLET, FILM COATED ORAL at 08:45

## 2020-04-19 RX ADMIN — Medication 10 ML: at 13:58

## 2020-04-19 NOTE — PROGRESS NOTES
Progress Note          Pt Name  Artie Meza   Date of Birth 1960   Medical Record Number  [id number]      Age  64 y.o. PCP None   Admit date:  4/17/2020    Room Number  2221/01  @ [facility]   Date of Service  4/19/2020     Admission Diagnoses:  <principal problem not specified>     History of present illness (copied from H&P)  \" Artie Meza is a 64 y.o.  male who presents with CC listed above. Pt is an unreliable historian as his information is convoluted at best. He knows he was hospitalized in February at [facility]. He denies going to any follow up appointment. He endorses chronic complains of cough and vertigo. Now feeling weak and having generalized abdominal pain. This has been on going for supposedly 6 weeks. \"       Assessment and plan:     Acute hypoxic respiratory failure, currently on 2L NC  Pneumonia -low probability   COVID-19 - ruled out   Neoplasia of lung with liver Mets   Bladder lesion (?)    · Appreciate input from GI, Pulmonology services   · Will transfer the pt out of COVID unit   · Continue Telemetry due to recent episodes of SVT - now improved   · Will continue Empiric abx as is for now. Acute on CKD   · Appreciate guidance from Dr. Kristin Becker   · Will leave rodriguez in due to reported retention     Chronic Systolic CHF      Possible COPD   Long hx of Tobacco abuse disorder   · Continue O2 supplementation - currently on 2L NC - wean as tolerated    Acute pancreatitis  Elevated LFTs  Hepatomegaly almost near umbilicus, firm, irregular on palpation - ominous sign in light of above findings     · Repeat Lipase in AM, currently > 3000k    Elevated troponin-somewhat flat patter, no chest pain, CKD with worsened renal function   Does not suggest ischemic event in this case. Hx of newly diagnosed systolic HF in Feb 3583  Hx of SVT  Continue telemetry .       Body mass index is 20.23 kg/m².  -             CODE STATUS   Full     Functional Status  Pt is a retired ,   Somehow he has become homeless   He is in touch with his son Mai Pérez    Surrogate decision maker:  Mai Pérez pt's son ,  [place]ai Pérez is an interstate      Prophylaxis   Hep SQ   Discharge Plan:  SNF/LTC, ? Misc   Benefit:  Payor: [facility] MEDICAID / Plan: [facility] / Product Type: Managed Care Medicaid /    Isolation :  Airborne, Contact, Droplet, Droplet Plus, Enteric Contact   ADT status:  INPATIENT      Query   None noted today    Prognosis   Guarded    Social issues  Date  Comment                        Subjective Data     \"I am ok \"  Review of Systems - History obtained from the patient  Respiratory ROS: no cough, shortness of breath, or wheezing  Cardiovascular ROS: no chest pain or dyspnea on exertion  Gastrointestinal ROS: positive for - abdominal pain    Objective Data       Comments  Somewhat emaciated gentleman in Covid isolation room. His nurse is attending his needs      Patient Vitals for the past 24 hrs:   BP   04/19/20 1151 107/75   04/19/20 0840 130/73   04/19/20 0344 123/83   04/18/20 2306 101/74   04/18/20 2037 114/79   04/18/20 1747 108/78   04/18/20 1500 111/85      Patient Vitals for the past 24 hrs:   Pulse   04/19/20 1151 86   04/19/20 0840 66   04/19/20 0344 99   04/18/20 2306 97   04/18/20 2037 88   04/18/20 1747 (!) 111   04/18/20 1500 (!) 112      Patient Vitals for the past 24 hrs:   Resp   04/19/20 1151 17   04/19/20 0840 18   04/19/20 0344 18   04/18/20 2306 18   04/18/20 2037 18   04/18/20 1500 18      Patient Vitals for the past 24 hrs:   Temp   04/19/20 1151 97.9 °F (36.6 °C)   04/19/20 0840 98 °F (36.7 °C)   04/19/20 0344 98 °F (36.7 °C)   04/18/20 2306 97.8 °F (36.6 °C)   04/18/20 2037 98 °F (36.7 °C)   04/18/20 1500 98.1 °F (36.7 °C)        SpO2 Readings from Last 6 Encounters:   04/19/20 96%   04/18/20 98%   02/15/20 93%   03/05/18 97%       O2 Flow Rate (L/min): 2 l/min  O2 Device: Room air Body mass index is 20.23 kg/m².  -  Wt Readings from Last 10 Encounters:   04/19/20 62.1 kg (137 lb)   04/17/20 68 kg (150 lb)   02/15/20 79.8 kg (175 lb 14.8 oz)   03/05/18 90.7 kg (200 lb)        Physical Exam:             General:  Alert, cooperative,   MAL  noursished,   well developed,   appears stated age    Ears/Eyes:  Hearing intact  Sclera anicteric.    Pupils equal   Mouth/Throat:  Mucous membranes normal pink and moist  Oral pharynx clear    Neck:     Lungs:  Trachea midline  Chest excursion symmetrical   Auscultation B/L Symmetrical with   Vesicular breath sounds     No Crepitations noted   Percussion note resonant on mid Clavicular line; no sign of pneumothorax        CVS:  Regular rate and rhythm   Systolic  murmur,   No click, rub or gallop  S1 normal   S2 normal   Pedal pulses  b/l symmetrical   Abdomen:  HEPATOMEGALY down to near umbilicus   Firm, irregular surface, non pulsatile -   Soft, Mild epigastric tenderness   Bowel sounds normal  No distension   Percussion note tympanitic   Extremities:    No cyanosis, jaundice  No edema noted   No sign of DVT/cord like lesion on palpation  No sign of acute trauma    Skin:    Skin color, texture, turgor normal. no acute rash or lesions    Lymph nodes:     Musculoskeletal Muscle bulk B/L symmetrical   Neuro Cranial nerves are intact,   motor movement b/l symmetrical,   Sensory evaluation b/l symmetrical    Psych:  Alert and oriented,   normal mood & affect          Medications reviewed     Current Facility-Administered Medications   Medication Dose Route Frequency    tamsulosin (FLOMAX) capsule 0.4 mg  0.4 mg Oral DAILY    sodium bicarbonate (8.4%) 100 mEq in 0.45% sodium chloride 1,000 mL infusion   IntraVENous CONTINUOUS    zinc sulfate (ZINCATE) 220 (50) mg capsule 1 Cap  1 Cap Oral DAILY    thiamine mononitrate (B-1) tablet 100 mg  100 mg Oral DAILY    therapeutic multivitamin (THERAGRAN) tablet 1 Tab  1 Tab Oral DAILY    lactobac ac& pc-s.therm-b.anim (PATRIZIA Q/RISAQUAD)  1 Cap Oral DAILY    morphine injection 1 mg  1 mg IntraVENous Q2H PRN    metoprolol tartrate (LOPRESSOR) tablet 50 mg  50 mg Oral BID    doxycycline (VIBRA-TABS) tablet 100 mg  100 mg Oral Q12H    metoprolol (LOPRESSOR) injection 2.5 mg  2.5 mg IntraVENous Q6H PRN    sodium chloride (NS) flush 5-40 mL  5-40 mL IntraVENous Q8H    sodium chloride (NS) flush 5-40 mL  5-40 mL IntraVENous PRN    cefTRIAXone (ROCEPHIN) 1 g in 0.9% sodium chloride (MBP/ADV) 50 mL  1 g IntraVENous Q24H    HYDROcodone-acetaminophen (NORCO) 5-325 mg per tablet 1 Tab  1 Tab Oral Q4H PRN    docusate sodium (COLACE) capsule 100 mg  100 mg Oral BID    heparin (porcine) injection 5,000 Units  5,000 Units SubCUTAneous Q8H    prochlorperazine (COMPAZINE) with saline injection 5 mg  5 mg IntraVENous Q8H PRN       Relevant other informations: Other medical conditions listed in [facility] problem list section; all of these and other pertinent data were taken into consideration when treatment plan is developed and customized to this patient's unique overall circumstances and needs. We have reviewed available old medical records within the constraints of this admission process.         Data Review:   Recent Days:  All Micro Results     None          Recent Labs     04/19/20  0329 04/18/20  0415 04/17/20  1632   WBC 9.4 9.5 10.7   HGB 14.1 15.0 15.9   HCT 42.2 44.0 47.2    213 258     Recent Labs     04/19/20  0329 04/18/20  0415 04/17/20  1632   * 136 136   K 5.3* 5.5* 5.1    108 103   CO2 18* 20* 22   GLU 94 121* 129*   BUN 85* 92* 96*   CREA 3.19* 3.31* 3.80*   CA 8.6 8.3* 8.7   MG 2.9* 3.0*  --    PHOS 3.3 3.8  --    ALB 2.6* 2.6* 2.8*   TBILI 1.1* 1.1* 1.2*   SGOT 404* 388* 344*   * 254* 259*      Lab Results   Component Value Date/Time    TSH 2.04 02/10/2020 10:12 PM            Care Plan discussed with:Patient/Family and Nurse   Other medical conditions are listed in the active hospital problem list section; these and other pertinent data were taken into consideration when the treatment plan was developed and customized to this patient's unique overall circumstances and needs. High complexity decision making was performed for this patient who is at high risk for decompensation with multiple organ involvement. Today total floor/unit time was 45 minutes while caring for this patient and greater than 50% of that time was spent with patient (and/or family) coordinating patients clinical issues; this includes time spent during multidisciplinary rounds.         Lurdes Alonso MD MPH FACP    4/19/2020

## 2020-04-19 NOTE — PROGRESS NOTES
0700: Bedside shift change report given to AVLENTINA Barahona RN (oncoming nurse) by Rosa Lundberg RN (offgoing nurse). Report included the following information SBAR and Kardex. 56: RN updated MD Dr. India Quarles on neg COVID 19 test.    1415: MD Dr. India Quarles at bedside, pt plan of care discussed with pt. RN received verbal orders to transfer pt to tele unit off COVID unit. 1445: TRANSFER - OUT REPORT:    Verbal report given to CAM Garvey (name) on Antelmo Loya  being transferred to Oncology, RN (unit) for routine progression of care       Report consisted of patients Situation, Background, Assessment and   Recommendations(SBAR). Information from the following report(s) SBAR and Kardex was reviewed with the receiving nurse. Lines:   Peripheral IV 04/17/20 Left Antecubital (Active)   Site Assessment Clean, dry, & intact 4/19/2020  2:18 PM   Phlebitis Assessment 0 4/19/2020  2:18 PM   Infiltration Assessment 0 4/19/2020  2:18 PM   Dressing Status Clean, dry, & intact 4/19/2020  2:18 PM   Dressing Type Transparent;Tape 4/19/2020  2:18 PM   Hub Color/Line Status Pink 4/19/2020  2:18 PM        Opportunity for questions and clarification was provided.       Patient transported with:   Registered Nurse

## 2020-04-19 NOTE — PROGRESS NOTES
Problem: Mobility Impaired (Adult and Pediatric)  Goal: *Acute Goals and Plan of Care (Insert Text)  Description: FUNCTIONAL STATUS PRIOR TO ADMISSION: Patient reported using crutches prior to admission due to LLE weakness, pain, numbness. Pt reported history of 2 falls just prior to admission. Pt stated 1 fall was in the bathtub and landed on his bottom and the other fall he reported his left side was wedged between the bed and dresser. Pt stated his left leg would just give out and also has episodes \"of blacking out. \"    HOME SUPPORT PRIOR TO ADMISSION: Patient was living in a hotel with an elevator that sometimes doesn't work and was staying on the 4th floor. Physical Therapy Goals  Initiated 4/19/2020  1. Patient will move from supine to sit and sit to supine  in bed with modified independence within 7 day(s). 2.  Patient will transfer from bed to chair and chair to bed with modified independence using the least restrictive device within 7 day(s). 3.  Patient will perform sit to stand with modified independence within 7 day(s). 4.  Patient will ambulate with modified independence for 50 feet with the least restrictive device within 7 day(s). 5.  Patient will ascend/descend 12 stairs with single handrail(s) with minimal assistance/contact guard assist within 7 day(s). Outcome: Progressing Towards Goal   PHYSICAL THERAPY EVALUATION  Patient: Antelmo Loya (47 y.o. male)  Date: 4/19/2020  Primary Diagnosis: Acute respiratory failure with hypoxia (HCC) [J96.01]        Precautions:   Fall      ASSESSMENT  Based on the objective data described below, the patient presents with decreased functional mobility, impaired balance and gait, increased pain in LLE and left side lower back and sacrum after admission on 4/17/2020 for SOB. Pt found to be negative for covid-19 virus. Pt reported history of 2 falls prior to this admission and has complaints of radiating LLE pain, numbness, and weakness.  Pt reported his left leg would just give out and result in falls. Pt may benefit from imaging of lumbar spine--discussed with RN. On evaluation, pt able to complete bed mobility with supervision, but with immediate pain in sacrum/coccyx while seated EOB and leaned to the R to offweight the L side. Attempted sit<>stand transfer and pt able to assume standing position with RW with CGA, but placed minimal to no weight through LLE during static standing and side stepping along the bedside. Pt returned to supine position with all needs met. Pt also with reports of episodes of \"blacking out\" and tried to obtain orthostatic BP readings during session, but unable to record due to tremulous UE movements. Pt will continue to benefit from PT to progress mobility as tolerated. Pt will benefit from inpatient rehab upon discharge. Current Level of Function Impacting Discharge (mobility/balance): CGA sit<>stand, side step along bedside with RW with CGA-min A. Minimal weight bearing tolerated LLE    Functional Outcome Measure: The patient scored Total Score: 9/28 on the Tinetti outcome measure which is indicative of high fall risk. Other factors to consider for discharge: radiating pain and numbness LLE, frequent falls     Patient will benefit from skilled therapy intervention to address the above noted impairments. PLAN :  Recommendations and Planned Interventions: bed mobility training, transfer training, gait training, therapeutic exercises, neuromuscular re-education, patient and family training/education, and therapeutic activities      Frequency/Duration: Patient will be followed by physical therapy:  5 times a week to address goals.     Recommendation for discharge: (in order for the patient to meet his/her long term goals)  Therapy 3 hours per day 5-7 days per week pending progress and hospital course  IF patient discharges home will need the following DME: to be determined (TBD)         SUBJECTIVE:   Patient stated My leg would just give out.     OBJECTIVE DATA SUMMARY:   HISTORY:    Past Medical History:   Diagnosis Date    Hypotension     Vertigo    No past surgical history on file. Personal factors and/or comorbidities impacting plan of care: frequent falls    Home Situation  Home Environment: (motel, homeless)  # Steps to Enter: (elevator works at times)  One/Two Story Residence: Other (Comment)  Living Alone: Yes  Support Systems: None  Patient Expects to be Discharged to[de-identified] Other (comment)  Current DME Used/Available at Home: Crutches    EXAMINATION/PRESENTATION/DECISION MAKING:   Critical Behavior:              Hearing: Auditory  Auditory Impairment: None  Skin:    Edema: L prepatellar and infrapatella edema  Range Of Motion:  AROM: Within functional limits(UE movement causes L flank pain; LLE pain low back)                       Strength:    Strength: Within functional limits(except LLE limited by pain)                    Tone & Sensation:                  Sensation: Impaired(c/o numbness LLE from groin distally)        Functional Mobility:  Bed Mobility:  Rolling: Supervision  Supine to Sit: Supervision  Sit to Supine: Supervision     Transfers:  Sit to Stand: Contact guard assistance  Stand to Sit: Contact guard assistance                       Balance:   Sitting: Impaired  Sitting - Static: Good (unsupported)(c/o sacral/coccyx pain)  Sitting - Dynamic: Fair (occasional)  Standing: Impaired; With support  Standing - Static: Fair;Good(w/RW to offweight LLE in standing)  Standing - Dynamic : Fair  Ambulation/Gait Training:  Distance (ft): 2 Feet (ft)  Assistive Device: Gait belt;Walker, rolling  Ambulation - Level of Assistance: Contact guard assistance        Gait Abnormalities: Antalgic;Decreased step clearance(hop/off weight LLE)        Base of Support: Narrowed; Shift to right  Stance: Left decreased  Speed/Isabell: Shuffled  Step Length: Right shortened;Left shortened         Functional Measure:  Tinetti test:    Sitting Balance: 1  Arises: 1  Attempts to Rise: 1  Immediate Standing Balance: 0  Standing Balance: 0  Nudged: 1  Eyes Closed: 0  Turn 360 Degrees - Continuous/Discontinuous: 0  Turn 360 Degrees - Steady/Unsteady: 0  Sitting Down: 1  Balance Score: 5 Balance total score  Indication of Gait: 0  R Step Length/Height: 0  L Step Length/Height: 0  R Foot Clearance: 1  L Foot Clearance: 1  Step Symmetry: 0  Step Continuity: 0  Path: 1  Trunk: 0  Walking Time: 1  Gait Score: 4 Gait total score  Total Score: 9/28 Overall total score         Tinetti Tool Score Risk of Falls  <19 = High Fall Risk  19-24 = Moderate Fall Risk  25-28 = Low Fall Risk  Tinetti ME. Performance-Oriented Assessment of Mobility Problems in Elderly Patients. [place] [address]; [id number]. (Scoring Description: PT Bulletin Feb. 10, 1993)    Older adults: Jose Pradhan et al, 2009; n = 1000 [facility] elderly evaluated with ABC, JULIETTE, ADL, and IADL)  · Mean JULIETTE score for males aged 69-68 years = 26.21(3.40)  · Mean JULIETTE score for females age 69-68 years = 25.16(4.30)  · Mean JULIETTE score for males over 80 years = 23.29(6.02)  · Mean JULIETTE score for females over 80 years = 17.20(8.32)           Based on the above components, the patient evaluation is determined to be of the following complexity level: LOW     Pain Rating:  Pt c/o L low back pain, LLE pain    Activity Tolerance:   Fair  Please refer to the flowsheet for vital signs taken during this treatment. After treatment patient left in no apparent distress:   Supine in bed, Call bell within reach, Bed / chair alarm activated, and Side rails x 3    COMMUNICATION/EDUCATION:   The patients plan of care was discussed with: Occupational therapist and Registered nurse. Fall prevention education was provided and the patient/caregiver indicated understanding., Patient/family have participated as able in goal setting and plan of care. , and Patient/family agree to work toward stated goals and plan of care.     Thank you for this referral.  Bernice Cotton, PT, DPT   Time Calculation: 25 mins

## 2020-04-19 NOTE — PROGRESS NOTES
NAME: Francois Chowdhury        :  1960        MRN:  [id number]        Assessment :    Plan:  --GUANAKO on CKD stage 4  Markedly distended bladder  Hyperkalemia  Mild Metabolic acidosis     New lung cancer w/ suspected mets to liver, pleura, left adrenal and mediastinal/hilar and retroperitoneal LN's     Pancreatitis Creatinine better (peaked at 3.8, now 3.2); marked urine retention (keep rodriguez); good uop; looks like obstructive uropathy    Start flomax    K 5.5 to 5.3    Sars-cov-2 neg    Start balanced crystalloid ivf's       Subjective:     Chief Complaint:  No complaint. Sob when he eats. 1800 ml urine when the rodriguez was placed. Overflow incontinence for a while. Review of Systems:    Symptom Y/N Comments  Symptom Y/N Comments   Fever/Chills    Chest Pain     Poor Appetite    Edema     Cough    Abdominal Pain     Sputum    Joint Pain     SOB/GRIFFIN    Pruritis/Rash     Nausea/vomit    Tolerating PT/OT     Diarrhea    Tolerating Diet     Constipation    Other       Could not obtain due to:      Objective:     VITALS:   Last 24hrs VS reviewed since prior progress note. Most recent are:  Visit Vitals  /83 (BP 1 Location: Right arm, BP Patient Position: At rest)   Pulse 99   Temp 98 °F (36.7 °C)   Resp 18   Wt 62.1 kg (137 lb)   SpO2 97%   BMI 20.23 kg/m²       Intake/Output Summary (Last 24 hours) at 2020 0654  Last data filed at 2020 0259  Gross per 24 hour   Intake 240 ml   Output 1845 ml   Net -1605 ml      Telemetry Reviewed:     PHYSICAL EXAM:  General: NAD  No edema  Soft, mild distension.     Lab Data Reviewed: (see below)    Medications Reviewed: (see below)    PMH/SH reviewed - no change compared to H&P  ________________________________________________________________________  Care Plan discussed with:  Patient     Family      RN     Care Manager                    Consultant:          Comments   >50% of visit spent in counseling and coordination of care       ________________________________________________________________________  Adin Queen MD     Procedures: see electronic medical records for all procedures/Xrays and details which  were not copied into this note but were reviewed prior to creation of Plan. LABS:  Recent Labs     04/19/20 0329 04/18/20 0415   WBC 9.4 9.5   HGB 14.1 15.0   HCT 42.2 44.0    213     Recent Labs     04/19/20 0329 04/18/20 0415 04/17/20  1632   * 136 136   K 5.3* 5.5* 5.1    108 103   CO2 18* 20* 22   BUN 85* 92* 96*   CREA 3.19* 3.31* 3.80*   GLU 94 121* 129*   CA 8.6 8.3* 8.7   MG 2.9* 3.0*  --    PHOS 3.3 3.8  --      Recent Labs     04/19/20 0329 04/18/20 0415 04/17/20  1632   SGOT 404* 388* 344*   * 696* 733*   TP 6.4 6.6 6.8   ALB 2.6* 2.6* 2.8*   GLOB 3.8 4.0 4.0   LPSE  --  >3,000* >3,000*     No results for input(s): INR, PTP, APTT, INREXT in the last 72 hours. Recent Labs     04/18/20  1200   FERR 973*      No results found for: FOL, RBCF   No results for input(s): PH, PCO2, PO2 in the last 72 hours.   Recent Labs     04/17/20  1632   *   CKMB 4.2*     No components found for: [place]  Lab Results   Component Value Date/Time    Color DARK YELLOW 04/18/2020 05:34 PM    Appearance CLEAR 04/18/2020 05:34 PM    Specific gravity 1.025 04/18/2020 05:34 PM    pH (UA) 5.0 04/18/2020 05:34 PM    Protein 30 (A) 04/18/2020 05:34 PM    Glucose Negative 04/18/2020 05:34 PM    Ketone Negative 04/18/2020 05:34 PM    Bilirubin Negative 04/18/2020 05:34 PM    Urobilinogen 0.2 04/18/2020 05:34 PM    Nitrites Negative 04/18/2020 05:34 PM    Leukocyte Esterase Negative 04/18/2020 05:34 PM    Epithelial cells FEW 04/18/2020 05:34 PM    Bacteria Negative 04/18/2020 05:34 PM    WBC 0-4 04/18/2020 05:34 PM    RBC 0-5 04/18/2020 05:34 PM       MEDICATIONS:  Current Facility-Administered Medications   Medication Dose Route Frequency    zinc sulfate (ZINCATE) 220 (50) mg capsule 1 Cap  1 Cap Oral DAILY    thiamine mononitrate (B-1) tablet 100 mg  100 mg Oral DAILY    therapeutic multivitamin (THERAGRAN) tablet 1 Tab  1 Tab Oral DAILY    lactobac ac& pc-s.therm-b.anim (PATRIZIA Q/RISAQUAD)  1 Cap Oral DAILY    morphine injection 1 mg  1 mg IntraVENous Q2H PRN    metoprolol tartrate (LOPRESSOR) tablet 50 mg  50 mg Oral BID    doxycycline (VIBRA-TABS) tablet 100 mg  100 mg Oral Q12H    metoprolol (LOPRESSOR) injection 2.5 mg  2.5 mg IntraVENous Q6H PRN    sodium chloride (NS) flush 5-40 mL  5-40 mL IntraVENous Q8H    sodium chloride (NS) flush 5-40 mL  5-40 mL IntraVENous PRN    cefTRIAXone (ROCEPHIN) 1 g in 0.9% sodium chloride (MBP/ADV) 50 mL  1 g IntraVENous Q24H    HYDROcodone-acetaminophen (NORCO) 5-325 mg per tablet 1 Tab  1 Tab Oral Q4H PRN    docusate sodium (COLACE) capsule 100 mg  100 mg Oral BID    heparin (porcine) injection 5,000 Units  5,000 Units SubCUTAneous Q8H    prochlorperazine (COMPAZINE) with saline injection 5 mg  5 mg IntraVENous Q8H PRN

## 2020-04-19 NOTE — PROGRESS NOTES
F/U for acute pancreatitis  Abnormal ct suggesting lung cancer and liver mets      S: Mr. Sumeet Genao was seen by me today during rounds. At this time, he is resting + comfortably. No significant abdominal pain today. The patient has no new complaints today. Please see admission consult for details of ROS; there are no other  changes today. O: Blood pressure 130/73, pulse 66, temperature 98 °F (36.7 °C), resp. rate 18, weight 62.1 kg (137 lb), SpO2 96 %. Gen: Patient is in no acute distress. There is no jaundice. Alert and oriented    Cross sectional imaging:  None new  Lab Results   Component Value Date/Time    WBC 9.4 04/19/2020 03:29 AM    HGB 14.1 04/19/2020 03:29 AM    HCT 42.2 04/19/2020 03:29 AM    PLATELET 185 43/86/5828 03:29 AM    .2 (H) 04/19/2020 03:29 AM     Lab Results   Component Value Date/Time    Sodium 135 (L) 04/19/2020 03:29 AM    Potassium 5.3 (H) 04/19/2020 03:29 AM    Chloride 107 04/19/2020 03:29 AM    CO2 18 (L) 04/19/2020 03:29 AM    Anion gap 10 04/19/2020 03:29 AM    Glucose 94 04/19/2020 03:29 AM    BUN 85 (H) 04/19/2020 03:29 AM    Creatinine 3.19 (H) 04/19/2020 03:29 AM    BUN/Creatinine ratio 27 (H) 04/19/2020 03:29 AM    GFR est AA 24 (L) 04/19/2020 03:29 AM    GFR est non-AA 20 (L) 04/19/2020 03:29 AM    Calcium 8.6 04/19/2020 03:29 AM    Bilirubin, total 1.1 (H) 04/19/2020 03:29 AM    AST (SGOT) 404 (H) 04/19/2020 03:29 AM    Alk. phosphatase 684 (H) 04/19/2020 03:29 AM    Protein, total 6.4 04/19/2020 03:29 AM    Albumin 2.6 (L) 04/19/2020 03:29 AM    Globulin 3.8 04/19/2020 03:29 AM    A-G Ratio 0.7 (L) 04/19/2020 03:29 AM    ALT (SGPT) 257 (H) 04/19/2020 03:29 AM     No new lipase     A: Active Problems:    Acute respiratory failure with hypoxia ([address] (4/17/2020)        Comment:  Bili is slightly high transaminases c/w metastatic disease. P:  elvated mcv suggests etoh may be cause of pancreatitis.   Will do us to look for stones  Clear liquids today  Consider low fat tomorrow if lipase down  In big picture pancreatitis less important than lung and liver lesions.   Defer to hospitalist and others about where best to get tissue    Lainey Pierson MD  11:25 AM  4/19/2020

## 2020-04-19 NOTE — PROGRESS NOTES
Problem: Self Care Deficits Care Plan (Adult)  Goal: *Acute Goals and Plan of Care (Insert Text)  Description:     FUNCTIONAL STATUS PRIOR TO ADMISSION: Independent to mod I for ADLs and mod I ambulating with crutches 2/2 development of progressing lumbar spine pain radiating down LLE which impedes his tolerance for bearing weight in his LLE. HOME SUPPORT: Most recently he was residing in a hotel room on the 4th floor. The hotel has an elevator, but it isn't consistently working. Occupational Therapy Goals  Initiated 4/19/2020  1. Patient will perform grooming standing at sink with supervision/set-up within 7 day(s). 2.  Patient will perform sponge bathing with supervision/set-up within 7 day(s). 3.  Patient will perform lower body dressing with supervision/set-up within 7 day(s). 4.  Patient will perform toilet transfers with supervision/set-up within 7 day(s). 5.  Patient will perform all aspects of toileting with supervision/set-up within 7 day(s). Outcome: Progressing Towards Goal    OCCUPATIONAL THERAPY EVALUATION  Patient: Ling Shane (29 y.o. male)  Date: 4/19/2020  Primary Diagnosis: Acute respiratory failure with hypoxia (HCC) [J96.01]        Precautions:   Fall    ASSESSMENT  Based on the objective data described below, the patient presents with lumbar spine pain radiating down his LLE, L flank and R scapular pain, as well as GW, poor activity tolerance, decreased safety awareness and decreased balance which is impairing his functional independence. Patient reports having had 2 falls, 1 occurring between his bed and dresser, just prior to being admitted to [facility]. He is functioning below his independent to mod I baseline, now performing ADLs at an independent to mod A level and is supervision to [facility] for functional mobility. The patient will benefit from acute OT intervention and will need inpatient rehab after discharge. Functional Outcome Measure:   The patient scored 35/100 on the Barthel Index outcome measure which is indicative of a 55% impairment in function. PLAN :  Recommendations and Planned Interventions: self care training, functional mobility training, balance training, therapeutic activities, endurance activities, patient education, home safety training, and family training/education    Frequency/Duration: Patient will be followed by occupational therapy 5 times a week to address goals. Recommendation for discharge: (in order for the patient to meet his/her long term goals)  Therapy 3 hours per day 5-7 days per week    Equipment recommendations for successful discharge (if) home: TBD in rehab, likely a RW       OBJECTIVE DATA SUMMARY:   HISTORY:   Past Medical History:   Diagnosis Date    Hypotension     Vertigo    No past surgical history on file. Expanded or extensive additional review of patient history:     Home Situation  Home Environment: (motel, homeless)  # Steps to Enter: (elevator works at times)  One/Two Story Residence: Other (Comment)  Living Alone: Yes  Support Systems: None  Patient Expects to be Discharged to[de-identified] Other (comment)  Current DME Used/Available at Home: Crutches    Hand dominance: Right    EXAMINATION OF PERFORMANCE DEFICITS:  Cognitive/Behavioral Status:  Neurologic State: Alert  Orientation Level: Oriented X4  Cognition: Appropriate for age attention/concentration; Follows commands; Impaired decision making        Safety/Judgement: Decreased awareness of need for safety      Hearing: Auditory  Auditory Impairment: None    Vision/Perceptual:    Acuity: Within Defined Limits    Corrective Lenses: Reading glasses    Range of Motion:  AROM: Within functional limits(UE movement causes L flank pain; LLE pain low back)    Strength:  Strength:  Within functional limits(except LLE limited by pain)      Tone & Sensation:     Sensation: Impaired(c/o numbness LLE from groin distally)       Balance:  Sitting: Impaired  Sitting - Static: Good (unsupported)(c/o sacral/coccyx pain)  Sitting - Dynamic: Fair (occasional)  Standing: Impaired; With support  Standing - Static: Fair;Good(w/RW to offweight LLE in standing)  Standing - Dynamic : Fair    Functional Mobility and Transfers for ADLs:  Bed Mobility:  Rolling: Supervision  Supine to Sit: Supervision  Sit to Supine: Supervision    Transfers:  Sit to Stand: Contact guard assistance  Stand to Sit: Contact guard assistance    ADL Assessment:  Feeding: Independent    Oral Facial Hygiene/Grooming: Supervision;Setup(seated)    Bathing: Moderate assistance    Upper Body Dressing: Supervision;Setup    Lower Body Dressing: Moderate assistance    Toileting: Moderate assistance                ADL Intervention and task modifications:  Initiated bed mobility, dressing ADL, transfer and safety training. Functional Measure:  Barthel Index:    Bathin  Bladder: 0  Bowels: 10  Groomin  Dressin  Feeding: 10  Mobility: 0  Stairs: 0  Toilet Use: 0  Transfer (Bed to Chair and Back): 10  Total: 35/100        Percentage of impairment   0%   1-19%   20-39%   40-59%   60-79%   80-99%   100%   Barthel Score 0-100 100 99-80 79-60 59-40 20-39 1-19   0     The Barthel ADL Index: Guidelines  1. The index should be used as a record of what a patient does, not as a record of what a patient could do. 2. The main aim is to establish degree of independence from any help, physical or verbal, however minor and for whatever reason. 3. The need for supervision renders the patient not independent. 4. A patient's performance should be established using the best available evidence. Asking the patient, friends/relatives and nurses are the usual sources, but direct observation and common sense are also important. However direct testing is not needed. 5. Usually the patient's performance over the preceding 24-48 hours is important, but occasionally longer periods will be relevant.   6. Middle categories imply that the patient supplies over 50 per cent of the effort. 7. Use of aids to be independent is allowed. Frederica Del., Barthel, D.W. ([id number]). Functional evaluation: the Barthel Index. [address] (14)2. Vinton Rohrer [place], J.J.M.F, Myrla Holter.Karson., Ward, [facility] (). Measuring the change indisability after inpatient rehabilitation; comparison of the responsiveness of the Barthel Index and Functional [place] Measure. Journal of Neurology, Neurosurgery, and Psychiatry, 66(4), 716-890. ARTIE Wells, DELISA Alamo, & Annmarie Corado M.A. (2004.) Assessment of post-stroke quality of life in cost-effectiveness studies: The usefulness of the Barthel Index and the EuroQoL-5D. Quality of Life Research, 13, 427-43        Pain Ratin/10   L flank, Lumbar spine radiating down LLE and R scapula. Nursing notified and x-rays of spine and pelvis recommended. Activity Tolerance:   Poor  Unable to obtain BP 2/2 patient moving UE. Patient shaking and with c/o dizziness in standing. Please refer to the flowsheet for vital signs taken during this treatment. After treatment patient left in no apparent distress:    Supine in bed, Call bell within reach, and Bed / chair alarm activated    COMMUNICATION/EDUCATION:   The patients plan of care was discussed with: Physical Therapist and Registered Nurse. Home safety education was provided and the patient/caregiver indicated understanding., Patient/family have participated as able in goal setting and plan of care. , and Patient/family agree to work toward stated goals and plan of care. This patients plan of care is appropriate for delegation to [facility].     Thank you for this referral.  Servando Angulo, OTR/L  Time Calculation: 25 mins

## 2020-04-19 NOTE — PROGRESS NOTES
Oncology End of Shift Note      Bedside shift change report given to CAM nunes (incoming nurse) by Gosia Church (outgoing nurse) on Muna Challenger. Report included the following information SBAR, Kardex, ED Summary, Intake/Output, MAR and Recent Results. Shift Summary:   No change in patient condition throughout shift. Patient complained of pain. Prn medication given. maintenance fluids running. Tolerating diet well. Issues for Physician to Address:  none     Patient on Cardiac Monitoring?     [x] Yes  [] No    Rhythm:          Shift Events        Melody Keane

## 2020-04-19 NOTE — PROGRESS NOTES
Rapid response team was called on the patient. It was reported that reason for Dr. Daniela Cortez team heart rate 144/min. Upon arrival to the patient, PPE was placed since the patient was for COVID rule out. Patient was lying in bed watching TV he denied any chest pain, palpitation, shortness of breath, dizziness or any other symptoms and he said\" I do not feel anything abnormal\". Vital signs revealed heart rate 125/min, blood pressure 120/80 mmHg, saturating 95% room air, respiratory rate 15 times per minute. 12 leads EKG was obtained revealed sinus tachycardia. Cardizem 5 mg IV x1 was given. Physical examination  Visit Vitals  /74 (BP 1 Location: Right arm, BP Patient Position: At rest)   Pulse 97   Temp 97.8 °F (36.6 °C)   Resp 18   SpO2 97%     General alert, awake, not in acute distress  Cardiovascular tachycardia, regular rhythm, normal S1 and S2  Pulmonary clear to auscultate bilaterally    Assessment and plan  SVT  Underlying history of SVT   Cardizem was given as above  Scheduled metoprolol   Work-up has been -February 2020 at [facility] [place]   Patient is still refusing further cardiac work-up   We will continue to follow and we will consider cardiac consultation if patient is okay with that.     Critical care time 35 minutes

## 2020-04-20 ENCOUNTER — APPOINTMENT (OUTPATIENT)
Dept: MRI IMAGING | Age: 60
DRG: 343 | End: 2020-04-20
Attending: PSYCHIATRY & NEUROLOGY
Payer: MEDICAID

## 2020-04-20 ENCOUNTER — APPOINTMENT (OUTPATIENT)
Dept: ULTRASOUND IMAGING | Age: 60
DRG: 343 | End: 2020-04-20
Attending: SPECIALIST
Payer: MEDICAID

## 2020-04-20 PROBLEM — G95.29 SPINAL CORD COMPRESSION DUE TO MALIGNANT NEOPLASM METASTATIC TO SPINE (HCC): Status: ACTIVE | Noted: 2020-04-20

## 2020-04-20 PROBLEM — C79.51 SPINAL CORD COMPRESSION DUE TO MALIGNANT NEOPLASM METASTATIC TO SPINE (HCC): Status: ACTIVE | Noted: 2020-04-20

## 2020-04-20 PROBLEM — G82.21 PARAPLEGIA, COMPLETE (HCC): Status: ACTIVE | Noted: 2020-04-20

## 2020-04-20 LAB
A1AT SERPL-MCNC: 219 MG/DL (ref 101–187)
ALBUMIN SERPL-MCNC: 2.6 G/DL (ref 3.5–5)
ALBUMIN/GLOB SERPL: 0.7 {RATIO} (ref 1.1–2.2)
ALP SERPL-CCNC: 728 U/L (ref 45–117)
ALT SERPL-CCNC: 270 U/L (ref 12–78)
ANA SER QL: NEGATIVE
ANION GAP SERPL CALC-SCNC: 10 MMOL/L (ref 5–15)
AST SERPL-CCNC: 489 U/L (ref 15–37)
ATRIAL RATE: 128 BPM
BILIRUB SERPL-MCNC: 1.9 MG/DL (ref 0.2–1)
BUN SERPL-MCNC: 81 MG/DL (ref 6–20)
BUN/CREAT SERPL: 29 (ref 12–20)
CALCIUM SERPL-MCNC: 8.4 MG/DL (ref 8.5–10.1)
CALCULATED P AXIS, ECG09: 72 DEGREES
CALCULATED R AXIS, ECG10: -70 DEGREES
CALCULATED T AXIS, ECG11: 80 DEGREES
CANCER AG19-9 SERPL-ACNC: 51 U/ML (ref 0–35)
CERULOPLASMIN SERPL-MCNC: 41.1 MG/DL (ref 16–31)
CHLORIDE SERPL-SCNC: 103 MMOL/L (ref 97–108)
CO2 SERPL-SCNC: 21 MMOL/L (ref 21–32)
CREAT SERPL-MCNC: 2.84 MG/DL (ref 0.7–1.3)
DIAGNOSIS, 93000: NORMAL
ERYTHROCYTE [DISTWIDTH] IN BLOOD BY AUTOMATED COUNT: 14.8 % (ref 11.5–14.5)
GLOBULIN SER CALC-MCNC: 4 G/DL (ref 2–4)
GLUCOSE SERPL-MCNC: 93 MG/DL (ref 65–100)
HAV IGM SER QL: NONREACTIVE
HBV CORE IGM SER QL: NONREACTIVE
HBV SURFACE AG SER QL: <0.1 INDEX
HBV SURFACE AG SER QL: NEGATIVE
HCT VFR BLD AUTO: 42.1 % (ref 36.6–50.3)
HCV AB SERPL QL IA: NONREACTIVE
HCV COMMENT,HCGAC: NORMAL
HGB BLD-MCNC: 14.1 G/DL (ref 12.1–17)
IL6 SERPL-MCNC: 42.7 PG/ML (ref 0–15.5)
LIPASE SERPL-CCNC: >3000 U/L (ref 73–393)
MAGNESIUM SERPL-MCNC: 2.6 MG/DL (ref 1.6–2.4)
MCH RBC QN AUTO: 34.2 PG (ref 26–34)
MCHC RBC AUTO-ENTMCNC: 33.5 G/DL (ref 30–36.5)
MCV RBC AUTO: 102.2 FL (ref 80–99)
MITOCHONDRIA M2 IGG SER-ACNC: <20 UNITS (ref 0–20)
NRBC # BLD: 0 K/UL (ref 0–0.01)
NRBC BLD-RTO: 0 PER 100 WBC
P-R INTERVAL, ECG05: 124 MS
PHOSPHATE SERPL-MCNC: 3.5 MG/DL (ref 2.6–4.7)
PLATELET # BLD AUTO: 178 K/UL (ref 150–400)
PMV BLD AUTO: 10.6 FL (ref 8.9–12.9)
POTASSIUM SERPL-SCNC: 5.3 MMOL/L (ref 3.5–5.1)
PROT SERPL-MCNC: 6.6 G/DL (ref 6.4–8.2)
Q-T INTERVAL, ECG07: 330 MS
QRS DURATION, ECG06: 80 MS
QTC CALCULATION (BEZET), ECG08: 481 MS
RBC # BLD AUTO: 4.12 M/UL (ref 4.1–5.7)
SARS-COV-2, COV2: NOT DETECTED
SODIUM SERPL-SCNC: 134 MMOL/L (ref 136–145)
SP1: NORMAL
SP2: NORMAL
SP3: NORMAL
SPECIMEN SOURCE, FCOV2M: NORMAL
VENTRICULAR RATE, ECG03: 128 BPM
WBC # BLD AUTO: 9.8 K/UL (ref 4.1–11.1)

## 2020-04-20 PROCEDURE — 65660000000 HC RM CCU STEPDOWN

## 2020-04-20 PROCEDURE — 74011250637 HC RX REV CODE- 250/637: Performed by: GENERAL ACUTE CARE HOSPITAL

## 2020-04-20 PROCEDURE — 74011250637 HC RX REV CODE- 250/637: Performed by: INTERNAL MEDICINE

## 2020-04-20 PROCEDURE — 72141 MRI NECK SPINE W/O DYE: CPT

## 2020-04-20 PROCEDURE — 72148 MRI LUMBAR SPINE W/O DYE: CPT

## 2020-04-20 PROCEDURE — 74011000258 HC RX REV CODE- 258: Performed by: PSYCHIATRY & NEUROLOGY

## 2020-04-20 PROCEDURE — 36415 COLL VENOUS BLD VENIPUNCTURE: CPT

## 2020-04-20 PROCEDURE — 94760 N-INVAS EAR/PLS OXIMETRY 1: CPT

## 2020-04-20 PROCEDURE — 74011000258 HC RX REV CODE- 258: Performed by: INTERNAL MEDICINE

## 2020-04-20 PROCEDURE — 74011250636 HC RX REV CODE- 250/636: Performed by: PSYCHIATRY & NEUROLOGY

## 2020-04-20 PROCEDURE — 83690 ASSAY OF LIPASE: CPT

## 2020-04-20 PROCEDURE — 85027 COMPLETE CBC AUTOMATED: CPT

## 2020-04-20 PROCEDURE — 74011000250 HC RX REV CODE- 250: Performed by: PSYCHIATRY & NEUROLOGY

## 2020-04-20 PROCEDURE — 74011250637 HC RX REV CODE- 250/637: Performed by: NURSE PRACTITIONER

## 2020-04-20 PROCEDURE — 83735 ASSAY OF MAGNESIUM: CPT

## 2020-04-20 PROCEDURE — 80053 COMPREHEN METABOLIC PANEL: CPT

## 2020-04-20 PROCEDURE — C9113 INJ PANTOPRAZOLE SODIUM, VIA: HCPCS | Performed by: PSYCHIATRY & NEUROLOGY

## 2020-04-20 PROCEDURE — 74011250636 HC RX REV CODE- 250/636: Performed by: GENERAL ACUTE CARE HOSPITAL

## 2020-04-20 PROCEDURE — 84100 ASSAY OF PHOSPHORUS: CPT

## 2020-04-20 PROCEDURE — 72146 MRI CHEST SPINE W/O DYE: CPT

## 2020-04-20 PROCEDURE — 76700 US EXAM ABDOM COMPLETE: CPT

## 2020-04-20 PROCEDURE — 74011000250 HC RX REV CODE- 250: Performed by: INTERNAL MEDICINE

## 2020-04-20 PROCEDURE — 74011000258 HC RX REV CODE- 258: Performed by: GENERAL ACUTE CARE HOSPITAL

## 2020-04-20 PROCEDURE — 74011250636 HC RX REV CODE- 250/636: Performed by: INTERNAL MEDICINE

## 2020-04-20 RX ORDER — DEXAMETHASONE SODIUM PHOSPHATE 10 MG/ML
20 INJECTION INTRAMUSCULAR; INTRAVENOUS EVERY 6 HOURS
Status: DISCONTINUED | OUTPATIENT
Start: 2020-04-20 | End: 2020-04-20

## 2020-04-20 RX ADMIN — MORPHINE SULFATE 1 MG: 2 INJECTION, SOLUTION INTRAMUSCULAR; INTRAVENOUS at 01:30

## 2020-04-20 RX ADMIN — SODIUM CHLORIDE 40 MG: 9 INJECTION, SOLUTION INTRAMUSCULAR; INTRAVENOUS; SUBCUTANEOUS at 10:29

## 2020-04-20 RX ADMIN — Medication 1 CAPSULE: at 09:47

## 2020-04-20 RX ADMIN — MORPHINE SULFATE 1 MG: 2 INJECTION, SOLUTION INTRAMUSCULAR; INTRAVENOUS at 03:54

## 2020-04-20 RX ADMIN — Medication 10 ML: at 13:11

## 2020-04-20 RX ADMIN — DEXAMETHASONE SODIUM PHOSPHATE 20 MG: 4 INJECTION, SOLUTION INTRA-ARTICULAR; INTRALESIONAL; INTRAMUSCULAR; INTRAVENOUS; SOFT TISSUE at 13:34

## 2020-04-20 RX ADMIN — MORPHINE SULFATE 1 MG: 2 INJECTION, SOLUTION INTRAMUSCULAR; INTRAVENOUS at 21:04

## 2020-04-20 RX ADMIN — THIAMINE HCL TAB 100 MG 100 MG: 100 TAB at 09:47

## 2020-04-20 RX ADMIN — THERA TABS 1 TABLET: TAB at 09:47

## 2020-04-20 RX ADMIN — Medication 10 ML: at 07:44

## 2020-04-20 RX ADMIN — CEFTRIAXONE 1 G: 1 INJECTION, POWDER, FOR SOLUTION INTRAMUSCULAR; INTRAVENOUS at 21:04

## 2020-04-20 RX ADMIN — Medication 10 ML: at 22:35

## 2020-04-20 RX ADMIN — SODIUM BICARBONATE: 84 INJECTION, SOLUTION INTRAVENOUS at 15:46

## 2020-04-20 RX ADMIN — DOCUSATE SODIUM 100 MG: 100 CAPSULE, LIQUID FILLED ORAL at 17:50

## 2020-04-20 RX ADMIN — DEXAMETHASONE SODIUM PHOSPHATE 20 MG: 4 INJECTION, SOLUTION INTRA-ARTICULAR; INTRALESIONAL; INTRAMUSCULAR; INTRAVENOUS; SOFT TISSUE at 22:34

## 2020-04-20 RX ADMIN — DOXYCYCLINE HYCLATE 100 MG: 100 TABLET, COATED ORAL at 21:04

## 2020-04-20 RX ADMIN — HYDROCODONE BITARTRATE AND ACETAMINOPHEN 1 TABLET: 5; 325 TABLET ORAL at 17:50

## 2020-04-20 RX ADMIN — MORPHINE SULFATE 1 MG: 2 INJECTION, SOLUTION INTRAMUSCULAR; INTRAVENOUS at 06:04

## 2020-04-20 RX ADMIN — METOPROLOL TARTRATE 50 MG: 50 TABLET, FILM COATED ORAL at 09:47

## 2020-04-20 RX ADMIN — DOXYCYCLINE HYCLATE 100 MG: 100 TABLET, COATED ORAL at 09:46

## 2020-04-20 RX ADMIN — HYDROCODONE BITARTRATE AND ACETAMINOPHEN 1 TABLET: 5; 325 TABLET ORAL at 10:28

## 2020-04-20 RX ADMIN — DOCUSATE SODIUM 100 MG: 100 CAPSULE, LIQUID FILLED ORAL at 09:45

## 2020-04-20 RX ADMIN — TAMSULOSIN HYDROCHLORIDE 0.4 MG: 0.4 CAPSULE ORAL at 09:47

## 2020-04-20 RX ADMIN — HEPARIN SODIUM 5000 UNITS: 5000 INJECTION INTRAVENOUS; SUBCUTANEOUS at 04:03

## 2020-04-20 RX ADMIN — HYDROCODONE BITARTRATE AND ACETAMINOPHEN 1 TABLET: 5; 325 TABLET ORAL at 02:56

## 2020-04-20 NOTE — PROGRESS NOTES
NAME: Gloria Carr        :  1960        MRN:  [id number]        Assessment :    Plan:  --GUANAKO on CKD stage 4  Markedly distended bladder  Hyperkalemia  Mild Metabolic acidosis     New lung cancer w/ suspected mets to liver, pleura, left adrenal and mediastinal/hilar and retroperitoneal LN's     Pancreatitis    hypotension Creatinine better (peaked at 3.8, now 3.2 to 2.84); marked urine retention (keep rodriguez); good uop; looks like obstructive uropathy    But may get worse by tomm with recent hypotension    Started on flomax    K 5.5 to 5.3 to 5.3    Ct HCO3 drip    Sars-cov-2 neg    D/W pt and RN       Subjective:     Chief Complaint:  Sleepy but arousable. BP low earlier. Had MRIs earlier    Review of Systems: deferred       Could not obtain due to: Drowsy, not reliable     Objective:     VITALS:   Last 24hrs VS reviewed since prior progress note. Most recent are:  Visit Vitals  BP 92/60   Pulse 99   Temp 98.1 °F (36.7 °C)   Resp 18   Wt 62.1 kg (137 lb)   SpO2 98%   BMI 20.23 kg/m²     No intake or output data in the 24 hours ending 20 1420   Telemetry Reviewed:     PHYSICAL EXAM:  General: cachectic, chronic ill appearing in NAD  Bitemporal wasting  No icterus, mmm  No JVD  No resp distress  Mild tachy earlier  No edema  Drowsy. Follows commands.      Lab Data Reviewed: (see below)    Medications Reviewed: (see below)    PMH/SH reviewed - no change compared to H&P  ________________________________________________________________________  Care Plan discussed with:  Patient y    Family      RN y    Care Manager                    Consultant:          Comments   >50% of visit spent in counseling and coordination of care       ________________________________________________________________________  Magdalena Matt MD     Procedures: see electronic medical records for all procedures/Xrays and details which  were not copied into this note but were reviewed prior to creation of Plan. LABS:  Recent Labs     04/20/20 0422 04/19/20 0329   WBC 9.8 9.4   HGB 14.1 14.1   HCT 42.1 42.2    214     Recent Labs     04/20/20 0422 04/19/20 0329 04/18/20 0415   * 135* 136   K 5.3* 5.3* 5.5*    107 108   CO2 21 18* 20*   BUN 81* 85* 92*   CREA 2.84* 3.19* 3.31*   GLU 93 94 121*   CA 8.4* 8.6 8.3*   MG 2.6* 2.9* 3.0*   PHOS 3.5 3.3 3.8     Recent Labs     04/20/20 0422 04/19/20 0329 04/18/20 0415 04/17/20  1632   SGOT 489* 404* 388* 344*   * 684* 696* 733*   TP 6.6 6.4 6.6 6.8   ALB 2.6* 2.6* 2.6* 2.8*   GLOB 4.0 3.8 4.0 4.0   LPSE >3,000*  --  >3,000* >3,000*     No results for input(s): INR, PTP, APTT, INREXT, INREXT in the last 72 hours. Recent Labs     04/19/20 0329 04/18/20  1200   TIBC 183*  --    PSAT 34  --    FERR 1,043* 973*      No results found for: FOL, RBCF   No results for input(s): PH, PCO2, PO2 in the last 72 hours.   Recent Labs     04/17/20  1632   *   CKMB 4.2*     MEDICATIONS:  Current Facility-Administered Medications   Medication Dose Route Frequency    pantoprazole (PROTONIX) 40 mg in 0.9% sodium chloride 10 mL injection  40 mg IntraVENous DAILY    dexamethasone (DECADRON) 20 mg in 0.9% sodium chloride 50 mL IVPB  20 mg IntraVENous Q6H    tamsulosin (FLOMAX) capsule 0.4 mg  0.4 mg Oral DAILY    sodium bicarbonate (8.4%) 100 mEq in 0.45% sodium chloride 1,000 mL infusion   IntraVENous CONTINUOUS    thiamine mononitrate (B-1) tablet 100 mg  100 mg Oral DAILY    therapeutic multivitamin (THERAGRAN) tablet 1 Tab  1 Tab Oral DAILY    lactobac ac& pc-s.therm-b.anim (PATRIZIA Q/RISAQUAD)  1 Cap Oral DAILY    morphine injection 1 mg  1 mg IntraVENous Q2H PRN    [Held by provider] metoprolol tartrate (LOPRESSOR) tablet 50 mg  50 mg Oral BID    doxycycline (VIBRA-TABS) tablet 100 mg  100 mg Oral Q12H    metoprolol (LOPRESSOR) injection 2.5 mg  2.5 mg IntraVENous Q6H PRN    sodium chloride (NS) flush 5-40 mL  5-40 mL IntraVENous Q8H    sodium chloride (NS) flush 5-40 mL  5-40 mL IntraVENous PRN    cefTRIAXone (ROCEPHIN) 1 g in 0.9% sodium chloride (MBP/ADV) 50 mL  1 g IntraVENous Q24H    HYDROcodone-acetaminophen (NORCO) 5-325 mg per tablet 1 Tab  1 Tab Oral Q4H PRN    docusate sodium (COLACE) capsule 100 mg  100 mg Oral BID    heparin (porcine) injection 5,000 Units  5,000 Units SubCUTAneous Q8H    prochlorperazine (COMPAZINE) with saline injection 5 mg  5 mg IntraVENous Q8H PRN

## 2020-04-20 NOTE — CONSULTS
Consult dictated: Patient has a sensory level around T7-T8 just below the nipples bilaterally, this looks like cord compression from metastatic disease, ordering stat MRI and have talked MRI scan, started on high-dose Decadron and consulting neurosurgery, may need radiation therapy but we do not really have a tissue diagnosis yet, difficult case. Consult  REFERRED BY:  None    CHIEF COMPLAINT: 1 month history of weakness and numbness in his legs      Subjective: Nancy Granados is a 64 y.o. right-handed -American male we are asked to evaluate at the request of Dr. Smith Lieu of sudden leg weakness bilaterally and became complete earlier this a.m., and reported by the nurses and we are asked to evaluate for evaluation. The patient has lesions in his lung described as mediastinal, right hilar, and retroperitoneal lymphadenopathy. Right lower lobe mass is seen, interstitial infiltrate. Throughout the right upper lobe, pleural-based masses are also noted in the right upper lobe, he has hepatic metastases left adrenal masses marked distention of the bladder with soft tissue nodule involving the bladder and possible omental metastases. Patient is not a smoker. Patient has been complaining of back pain and gradual progressive weakness and numbness in his legs for the last month, having difficulty walking and having a drinks of around the last month. He denies any recent fall but did have an infection on admission. He is COVID negative. Patient is an ex-smoker. He denies any difficulty speaking swallowing or neck pain or headache or cranial nerve problems. He has difficulty with bowel or bladder control and movements. He has a markedly distended bladder on his CT scans. Patient has a sensory level around T7-T8 on neurologic examination complete flaccid paraplegia from the lower levels down. He has no difficulty with weakness or numbness in his arms.   Again this morning was the first time this paraplegia was discovered. Past Medical History:   Diagnosis Date    Hypotension     Vertigo       No past surgical history on file. No family history on file. Social History     Tobacco Use    Smoking status: Former Smoker   Substance Use Topics    Alcohol use: Yes     Comment: 2-3 cans of beer a day.           Current Facility-Administered Medications:     pantoprazole (PROTONIX) 40 mg in 0.9% sodium chloride 10 mL injection, 40 mg, IntraVENous, DAILY, Daksha Zhao MD, 40 mg at 04/20/20 1029    dexamethasone (DECADRON) 20 mg in 0.9% sodium chloride 50 mL IVPB, 20 mg, IntraVENous, Q6H, Daksha Zhao MD    [facility]) capsule 0.4 mg, 0.4 mg, Oral, DAILY, Komal Mcnair MD, 0.4 mg at 04/20/20 0947    sodium bicarbonate (8.4%) 100 mEq in 0.45% sodium chloride 1,000 mL infusion, , IntraVENous, CONTINUOUS, Komal Mcnair MD, Last Rate: 84 mL/hr at 04/19/20 0850    thiamine mononitrate (B-1) tablet 100 mg, 100 mg, Oral, DAILY, Ingris Navarro MD, 100 mg at 04/20/20 0947    therapeutic multivitamin (THERAGRAN) tablet 1 Tab, 1 Tab, Oral, DAILY, Ingris Navarro MD, 1 Tab at 04/20/20 0947    lactobac ac& pc-s.therm-b.anim (PATRIZIA Q/RISAQUAD), 1 Cap, Oral, DAILY, Ingris Navarro MD, 1 Cap at 04/20/20 9051    morphine injection 1 mg, 1 mg, IntraVENous, Q2H PRN, Ruchi Carson MD, 1 mg at 04/20/20 0604    metoprolol tartrate (LOPRESSOR) tablet 50 mg, 50 mg, Oral, BID, Tere Murillo NP, 50 mg at 04/20/20 0947    doxycycline (VIBRA-TABS) tablet 100 mg, 100 mg, Oral, Q12H, Ruchi Carson MD, 100 mg at 04/20/20 0946    metoprolol (LOPRESSOR) injection 2.5 mg, 2.5 mg, IntraVENous, Q6H PRN, Ruchi Carson MD    sodium chloride (NS) flush 5-40 mL, 5-40 mL, IntraVENous, Q8H, Vaishnavi Sainz MD, 10 mL at 04/20/20 0744    sodium chloride (NS) flush 5-40 mL, 5-40 mL, IntraVENous, PRN, Kayla Marshall MD    cefTRIAXone (ROCEPHIN) 1 g in 0.9% sodium chloride (MBP/ADV) 50 mL, 1 g, IntraVENous, Q24H, Mynor Byrnes MD, Last Rate: 100 mL/hr at 04/19/20 2144, 1 g at 04/19/20 2144    HYDROcodone-acetaminophen (NORCO) 5-325 mg per tablet 1 Tab, 1 Tab, Oral, Q4H PRN, Mynor Byrnes MD, 1 Tab at 04/20/20 1028    docusate sodium (COLACE) capsule 100 mg, 100 mg, Oral, BID, Mynor Byrnes MD, 100 mg at 04/20/20 0945    heparin (porcine) injection 5,000 Units, 5,000 Units, SubCUTAneous, Q8H, Mynor Byrnes MD, 5,000 Units at 04/20/20 0403    prochlorperazine (COMPAZINE) with saline injection 5 mg, 5 mg, IntraVENous, Q8H PRN, Mynor Byrnes MD        No Known Allergies   MRI Results (most recent):  No results found for this or any previous visit. No results found for this or any previous visit. Review of Systems:  A comprehensive review of systems was negative except for: Constitutional: positive for fatigue and malaise  Respiratory: positive for pleurisy/chest pain, dyspnea on exertion, emphysema or chronic bronchitis  Gastrointestinal: positive for reflux symptoms, nausea, vomiting, change in bowel habits, constipation and abdominal pain  Musculoskeletal: positive for myalgias, arthralgias, stiff joints, back pain and muscle weakness  Neurological: positive for paresthesia, coordination problems, gait problems and weakness   Vitals:    04/20/20 0800 04/20/20 0800 04/20/20 0942 04/20/20 1042   BP:  108/64 105/66 106/67   Pulse:  (!) 103 (!) 106 (!) 105   Resp: 16 16  18   Temp: 97.9 °F (36.6 °C) 97.9 °F (36.6 °C)  98.1 °F (36.7 °C)   SpO2: 96% 96%  98%   Weight:         Objective:     I      NEUROLOGICAL EXAM:    Appearance: The patient is poorly developed and nourished, provides a poor history and is in no acute distress. Mental Status: Oriented to time, place and person, and the president, cognitive function is slow but probably normal and speech is fluent and no aphasia or dysarthria. Mood and affect appropriate. Cranial Nerves:   Intact visual fields.  Fundi are benign, disc are flat, no lesions seen on funduscopy. MARY, EOM's full, no nystagmus, no ptosis. Facial sensation is normal. Corneal reflexes are not tested. Facial movement is symmetric. Hearing is abnormal bilaterally. Palate is midline with normal sternocleidomastoid and trapezius muscles are normal. Tongue is midline. Neck without meningismus or bruits  Temporal arteries are not tender or enlarged  TMJ areas are not tender on palpation   Motor:  4/5 strength in upper proximal and distal muscles. Patient has a flaccid paraplegia below his nipple line. Normal bulk and decreased muscle tone. No fasciculations. Rapid alternating movement is symmetric and intact in the upper extremities, but no movement in lower extremities   Reflexes:   Deep tendon reflexes 1+/4 in the upper extremities, and absent in both lower extremities. No babinski or clonus present   Sensory:   Abnormal to touch, pinprick and vibration and temperature from mid to lower chest down. DSS is intact in the upper extremities   Gait:  Not testable. Tremor:   No tremor noted. Cerebellar:  Not testable cerebellar signs present on Romberg and tandem testing and finger-nose-finger exam is unremarkable. Neurovascular:  Normal heart sounds and regular rhythm, peripheral pulses decreased, and no carotid bruits. 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 Have started the patient on high-dose Decadron 20 mg every 6 hours, and consulted neurosurgery just in case there is a neurosurgical lesion which I doubt.   Difficult case and have discussed with the attendings, MRI scan, and other physicians. Patient high risk serious and progressive and permanent neurologic disability, dysfunction and possibly even death. Prognosis very poor, we do not have tissue diagnosis yet,  Will probably need radiation therapy if  he has a lesion that is amenable to radiation.     Signed By: Nyasia Burks MD     April 20, 2020       CC: None  FAX: None

## 2020-04-20 NOTE — PROGRESS NOTES
Rapid response called for patient inability to lift both legs off of bed, unable to wiggle toes. Feels touch, but is decreased in certain places on legs and feet. Dr. Cassius Rausch has not come to Stroke Alert which was called at . Will follow and support staff.   Further evaluation to be done by MD.

## 2020-04-20 NOTE — PROGRESS NOTES
[facility]  Pulmonary, Critical Care, and Sleep Medicine    Name: Melany Cody MRN: [id number]   : 1960 Hospital: [address]   Date: 2020        Pulmonary Note        : On RA. Denies shortness of breath. Has cough productive of white sputum. COVID negative. ; hard to get comfortable. Seen earlier. RRT assessment by  noted. Hard to get comfortable. RLL chest hurts. No SOB on room air    IMPRESSION:   · RLL lung mass with mediastinal and hilar adenopathy- Suspected Lung Cancer, liver mets? Adrenal Masses, LAD. · New sensory lesion  · COVID negative  · No overt Pneumonia. Does have a lung mass. · Acute on Chronic Renal failure  · Smoker most likely has COPD. · Bladder lesion  · Systolic CHF: based on echo from 20: LVEF of 69%, Diastolic Dysfunction, Mild Pulmonary Htn, Moderate Mitral Regurg. · SVT, may need cards eval. Currently in SR  · Increased LFTs  · Etoh use, at lease 2-3 beers per day. · Lipase over 3K, suggest pancreatitis, liver mets, Reviewed note of  Dr. Lázaro Ordaz. · Homeless, but has been living in a hotel. RECOMMENDATIONS:   · Would agree with liver bx, as first step. This would give pathologic dx and staging information  · Neurology eval in process  · May need cards for SVT and cardiac risk stratification. · Continue abx  · Will follow with you. Subjective/History: This patient has been seen and evaluated at the request of Dr. Salma Saravia for above. Patient is a 64 y.o. male   Who presents for above. Pt seems to be a marginal hx. He seems to give different reports to different providers. He was hospitalized at [facility]. Has a chronic cough and vertigo. Has been with weakness of generalized abdominal pain. Has been ongoing for above 6 weeks. Past Medical History:   Diagnosis Date    Hypotension     Vertigo       No past surgical history on file.    Prior to Admission medications    Medication Sig Start Date End Date Taking? Authorizing Provider   amoxicillin-clavulanate (AUGMENTIN) 875-125 mg per tablet Take 1 Tab by mouth two (2) times a day. 2/15/20   Landon Cardoso MD     Current Facility-Administered Medications   Medication Dose Route Frequency    pantoprazole (PROTONIX) 40 mg in 0.9% sodium chloride 10 mL injection  40 mg IntraVENous DAILY    dexamethasone (DECADRON) 20 mg in 0.9% sodium chloride 50 mL IVPB  20 mg IntraVENous Q6H    tamsulosin (FLOMAX) capsule 0.4 mg  0.4 mg Oral DAILY    sodium bicarbonate (8.4%) 100 mEq in 0.45% sodium chloride 1,000 mL infusion   IntraVENous CONTINUOUS    thiamine mononitrate (B-1) tablet 100 mg  100 mg Oral DAILY    therapeutic multivitamin (THERAGRAN) tablet 1 Tab  1 Tab Oral DAILY    lactobac ac& pc-s.therm-b.anim (PATRIZIA Q/RISAQUAD)  1 Cap Oral DAILY    metoprolol tartrate (LOPRESSOR) tablet 50 mg  50 mg Oral BID    doxycycline (VIBRA-TABS) tablet 100 mg  100 mg Oral Q12H    sodium chloride (NS) flush 5-40 mL  5-40 mL IntraVENous Q8H    cefTRIAXone (ROCEPHIN) 1 g in 0.9% sodium chloride (MBP/ADV) 50 mL  1 g IntraVENous Q24H    docusate sodium (COLACE) capsule 100 mg  100 mg Oral BID    heparin (porcine) injection 5,000 Units  5,000 Units SubCUTAneous Q8H     No Known Allergies   Social History     Tobacco Use    Smoking status: Former Smoker   Substance Use Topics    Alcohol use: Yes     Comment: 2-3 cans of beer a day. No family history on file. Review of Systems:  A comprehensive review of systems was negative. Objective:   Vital Signs:    Visit Vitals  /67   Pulse (!) 105   Temp 98.1 °F (36.7 °C)   Resp 18   Wt 62.1 kg (137 lb)   SpO2 98%   BMI 20.23 kg/m²       O2 Device: Room air   O2 Flow Rate (L/min): 2 l/min   Temp (24hrs), Av.6 °F (36.4 °C), Min:96.4 °F (35.8 °C), Max:98.1 °F (36.7 °C)       Intake/Output:   Last shift:      No intake/output data recorded.   Last 3 shifts: 1 -  0700  In: 240 [P.O.:240]  Out: 1225 [Urine:1225]    Intake/Output Summary (Last 24 hours) at 4/20/2020 1114  Last data filed at 4/19/2020 1401  Gross per 24 hour   Intake    Output 425 ml   Net -425 ml     Hemodynamics:   PAP:   CO:     Wedge:   CI:     CVP:    SVR:       PVR:       Ventilator Settings:  Mode Rate Tidal Volume Pressure FiO2 PEEP                    Peak airway pressure:      Minute ventilation:        Physical Exam:    General:  Alert, cooperative   Head:    Eyes:     Nose:    Throat:    Neck:    Back:      Lungs:   Diminished breath sounds in R base o/w CTA   Chest wall:     Heart:  RRR   Abdomen:   Soft, normal bowel sounds   Extremities: No edema   Pulses:    Skin:    Lymph nodes:    Neurologic: Oriented x 3, no gross deficits       Data:     Recent Results (from the past 24 hour(s))   CBC W/O DIFF    Collection Time: 04/20/20  4:22 AM   Result Value Ref Range    WBC 9.8 4.1 - 11.1 K/uL    RBC 4.12 4.10 - 5.70 M/uL    HGB 14.1 12.1 - 17.0 g/dL    HCT 42.1 36.6 - 50.3 %    .2 (H) 80.0 - 99.0 FL    MCH 34.2 (H) 26.0 - 34.0 PG    MCHC 33.5 30.0 - 36.5 g/dL    RDW 14.8 (H) 11.5 - 14.5 %    PLATELET 167 083 - 670 K/uL    MPV 10.6 8.9 - 12.9 FL    NRBC 0.0 0  WBC    ABSOLUTE NRBC 0.00 0.00 - 0.01 K/uL   PHOSPHORUS    Collection Time: 04/20/20  4:22 AM   Result Value Ref Range    Phosphorus 3.5 2.6 - 4.7 MG/DL   MAGNESIUM    Collection Time: 04/20/20  4:22 AM   Result Value Ref Range    Magnesium 2.6 (H) 1.6 - 2.4 mg/dL   METABOLIC PANEL, COMPREHENSIVE    Collection Time: 04/20/20  4:22 AM   Result Value Ref Range    Sodium 134 (L) 136 - 145 mmol/L    Potassium 5.3 (H) 3.5 - 5.1 mmol/L    Chloride 103 97 - 108 mmol/L    CO2 21 21 - 32 mmol/L    Anion gap 10 5 - 15 mmol/L    Glucose 93 65 - 100 mg/dL    BUN 81 (H) 6 - 20 MG/DL    Creatinine 2.84 (H) 0.70 - 1.30 MG/DL    BUN/Creatinine ratio 29 (H) 12 - 20      GFR est AA 28 (L) >60 ml/min/1.73m2    GFR est non-AA 23 (L) >60 ml/min/1.73m2    Calcium 8.4 (L) 8.5 - 10.1 MG/DL    Bilirubin, total 1.9 (H) 0.2 - 1.0 MG/DL    ALT (SGPT) 270 (H) 12 - 78 U/L    AST (SGOT) 489 (H) 15 - 37 U/L    Alk. phosphatase 728 (H) 45 - 117 U/L    Protein, total 6.6 6.4 - 8.2 g/dL    Albumin 2.6 (L) 3.5 - 5.0 g/dL    Globulin 4.0 2.0 - 4.0 g/dL    A-G Ratio 0.7 (L) 1.1 - 2.2     LIPASE    Collection Time: 04/20/20  4:22 AM   Result Value Ref Range    Lipase >3,000 (H) 73 - 393 U/L             Telemetry:Sinus tachycardia with premature atrial complexes in a pattern of bigeminy   Left anterior fascicular block   Inferior infarct (cited on or before 10-FEB-2020)   Cannot rule out Anterior infarct , age undetermined   Abnormal ECG   When compared with ECG of 10-FEB-2020 23:05,   premature ventricular complexes are no longer present   premature atrial complexes are now present   Left anterior fascicular block is now present   Nonspecific T wave abnormality now evident in Inferior leads     Imaging:  I have personally reviewed the patients radiographs and have reviewed the reports:  4-17-20: IMPRESSION:  Mediastinal, right hilar, and retroperitoneal lymphadenopathy. Right lower lobe  mass lesion as well as postobstructive consolidation. Interstitial infiltrate is  noted throughout the right upper lobe. Pleural-based mass lesions throughout the  right upper lobe. Hepatic metastases. Left adrenal masses concerning for  metastatic disease. Marked distention of the bladder with soft tissue nodule  involving the left bladder wall may represent an omental metastasis. 4-17-20: CXR: is consistent with prior lung mass. NO acute or new infiltrates.       Nanda Lamas MD

## 2020-04-20 NOTE — PROGRESS NOTES
OT note:  Code stroke called overhead. Noted pending MRI of spine. Will defer and continue to follow.  Thank you

## 2020-04-20 NOTE — PROGRESS NOTES
F/U for pancreatitis    S: Mr. Nancy Granados was seen by me today during rounds. At this time, he is resting uncomfortably. The patient has + new complaints today. Please see admission consult for details of ROS; there are + changes today. A rapid response was called this AM due to sudden numbness and weakness from thoracic level down. He was evaluated by neurology who has ordered MRI cervical/thoracic/lumbar spine and suspects metastatic disease causing cord compression. He states for the past 5 min he's had severe pain along his rib cage. No nausea, vomiting. Is NPO. Reviewed history-denies ETOH in the past 4 months. No hx of pancreatitis or FH of pancreatitis. O: Blood pressure 105/66, pulse (!) 106, temperature 97.9 °F (36.6 °C), resp. rate 16, weight 62.1 kg (137 lb), SpO2 96 %. Gen: Patient is in no acute distress. There is no jaundice. Lungs: Clear to auscultation bilaterally . Heart: RRR. Abd: Soft, tender in epigastrium, non-distended, bowel sounds present. Extremities: Warm. Cross sectional imaging:    CT Results (most recent):  Results from Hospital Encounter encounter on 04/17/20   CT ABD PELV WO CONT    Narrative EXAM: CT CHEST WO CONT, CT ABD PELV WO CONT    INDICATION: Pancreatitis, follow-up abnormal chest x-ray    COMPARISON: Chest radiograph performed earlier the same day    CONTRAST: None. TECHNIQUE:   Noncontrast axial images were obtained through the chest, abdomen and pelvis. Coronal and sagittal reconstructions were generated. Oral contrast was not  administered. CT dose reduction was achieved through use of a standardized  protocol tailored for this examination and automatic exposure control for dose  modulation. FINDINGS:     CHEST WALL: No mass or axillary lymphadenopathy. THYROID: No nodule. MEDIASTINUM: Bulky superior mediastinal, right paratracheal, prevascular, and  subcarinal lymphadenopathy is noted.  Reference 3.3 cm subcarinal lymph node and  4.6 cm superior mediastinal lymph node. KAITLIN: Evaluation of the hilum is limited bilateral intravenous contrast. Right  hilar lymphadenopathy is evident. This is encasing the right mainstem bronchus  and right lower lobe bronchus. THORACIC AORTA: No dissection or aneurysm. MAIN PULMONARY ARTERY: Normal in caliber. TRACHEA/BRONCHI: Patent. ESOPHAGUS: No wall thickening or dilatation. HEART: Small pericardial effusion. PLEURA: No effusion or pneumothorax. LUNGS: Right lower lobe mass lesion as well as postobstructive consolidation,  maximum dimension 5.1 cm. Pleural-based lesions are noted throughout the right  lung. Interstitial thickening is noted in the right upper lobe. LIVER: Numerous large hepatic masses are noted throughout all segments of an  enlarged liver. Reference mass in the dome of the liver measures 4.6 cm. BILIARY TREE: Gallbladder is contracted but no stones are identified. CBD is not  dilated. SPLEEN: within normal limits. PANCREAS: There is fullness of the pancreatic head but evaluation is limited by  the lack of oral and intravenous contrast.  ADRENALS: 2.5 cm left adrenal mass along with a second 17 mm left adrenal mass. KIDNEYS: 2.2 cm cyst upper pole right kidney. No renal or ureteral stone. Mild  right hydronephrosis. STOMACH: Unremarkable. SMALL BOWEL: No dilatation or wall thickening. COLON: No dilatation or wall thickening. APPENDIX: Unremarkable. PERITONEUM: No ascites or pneumoperitoneum. RETROPERITONEUM: Left para-aortic and aortocaval lymphadenopathy, referenced 19  mm left para-aortic lymph node. There is a 2.3 cm left common iliac artery  aneurysm. REPRODUCTIVE ORGANS: There is no pelvic mass or lymphadenopathy. URINARY BLADDER: Markedly distended. There is a 2.7 x 1.8 cm soft tissue nodule  involving the left anterior bladder wall. BONES: No destructive bone lesion. ABDOMINAL WALL: No mass or hernia.   ADDITIONAL COMMENTS: N/A      Impression IMPRESSION:  Mediastinal, right hilar, and retroperitoneal lymphadenopathy. Right lower lobe  mass lesion as well as postobstructive consolidation. Interstitial infiltrate is  noted throughout the right upper lobe. Pleural-based mass lesions throughout the  right upper lobe. Hepatic metastases. Left adrenal masses concerning for  metastatic disease. Marked distention of the bladder with soft tissue nodule  involving the left bladder wall may represent an omental metastasis. Lab Results   Component Value Date/Time    Sodium 134 (L) 04/20/2020 04:22 AM    Potassium 5.3 (H) 04/20/2020 04:22 AM    Chloride 103 04/20/2020 04:22 AM    CO2 21 04/20/2020 04:22 AM    Anion gap 10 04/20/2020 04:22 AM    Glucose 93 04/20/2020 04:22 AM    BUN 81 (H) 04/20/2020 04:22 AM    Creatinine 2.84 (H) 04/20/2020 04:22 AM    BUN/Creatinine ratio 29 (H) 04/20/2020 04:22 AM    GFR est AA 28 (L) 04/20/2020 04:22 AM    GFR est non-AA 23 (L) 04/20/2020 04:22 AM    Calcium 8.4 (L) 04/20/2020 04:22 AM    Bilirubin, total 1.9 (H) 04/20/2020 04:22 AM    AST (SGOT) 489 (H) 04/20/2020 04:22 AM    Alk. phosphatase 728 (H) 04/20/2020 04:22 AM    Protein, total 6.6 04/20/2020 04:22 AM    Albumin 2.6 (L) 04/20/2020 04:22 AM    Globulin 4.0 04/20/2020 04:22 AM    A-G Ratio 0.7 (L) 04/20/2020 04:22 AM    ALT (SGPT) 270 (H) 04/20/2020 04:22 AM     Lab Results   Component Value Date/Time    Lipase >3,000 (H) 04/20/2020 04:22 AM       A: Active Problems:    Acute respiratory failure with hypoxia ([address] (4/17/2020)        Comment:  I spoke with MRI department. We may be able to add on MRI abd/MRCP at the time his spine MRI is done but it would require patient to be scanned for several hours so may need to be deferred until a later date. P: Keep NPO and treat pancreatitis with IVF and supportive care. Proceed with MRI abd with MRCP today if able. If not, proceed with abd ultrasound. Neurology workup takes precedent.   MRI to evaluate for metastatic disease occluding bile duct and leading to pancreatitis or pancreatic head mass.  pending. Elevated LFT's could be caused by metastatic liver disease. Liver serologies pending.      VIPUL Hawthorne  04/20/20  10:13 AM

## 2020-04-20 NOTE — PROGRESS NOTES
CARMELA:   1) Hospice? 1:50 PM- CM reviewed chart and noted pt is very sick despite low RRAT score. Pt has had 2 code's called within 2 days- Rapid response on 4/18 for high HR and code stroke moments ago on 4/20. Prior to admission pt was independent but homeless- he has been homeless for 4-5 years and has [facility] and [facility] (per CM note on 2/13 at [facility]). Pt has declined multiple times in the past getting set up with a PCP. No emergency contact on the chart to call and complete assessment- palliative is following and plans to meet with pt after testing. Pt does have the ability to go to LTC as he has Medicaid- may be an option if pt is hospice appropriate. CM will continue to follow and remain available for support as needed.      Corry Hopper, MSW, [address]   [phone]

## 2020-04-20 NOTE — ROUTINE PROCESS
Oncology End of Shift Note Bedside shift change report given to Elizabeth Marrero RN (incoming nurse) by Geni Terry (outgoing nurse) on Gerold Beverage. Report included the following information SBAR, Kardex, Intake/Output and MAR. Shift Summary: low bp at 3am 82/68 (99/68), pain increased throughout the night, requiring pain medication q 2 hours, unable to bend left leg (reports he has been having problems in that leg for months), pain is generalized but states specific pain in L knee and LLQ. Issues for Physician to Address:  1. Talk with him about code status 2. Palliative consult? Patient on Cardiac Monitoring? [x] Yes 
[] No 
 
Rhythm: Sinus Tach/Muliform PVCs Shift Events Geni Terry

## 2020-04-20 NOTE — PROGRESS NOTES
Physical therapy:    Code stroke called overhead. Noted pending MRI of spine. Will defer and continue to follow.  Thank you    Vaibhav Gramajo, PT, DPT

## 2020-04-20 NOTE — CONSULTS
Pt with diffuse bony and intra-thecal mets.  No extrinsic cord compression,  No neurosurgical intervention beneficial or appropriate

## 2020-04-20 NOTE — PROGRESS NOTES
Palliative Medicine  [place]: [phone]  [facility]: [phone] ([phone])    Palliative Consult noted for Care Decisions. Patient with Lung Cancer, mets to liver. Now is experiencing paraplegia. MRI ordered and patient waiting to go for further diagnostic testing. Palliative team will see patient after diagnostic workups are completed so patient can have information about his medical issues and be able to make decisions based on informed consent. Thank You for this referral. We will follow up after initial work ups are completed.

## 2020-04-20 NOTE — PROGRESS NOTES
I spoke to nurse on floor.     Patient getting multiple mri    Patient not seen    Jake Brown MD  12:06 PM  4/20/2020

## 2020-04-20 NOTE — PROGRESS NOTES
I reviewed pertinent labs and imaging, and discussed /agreed on the plan of care with Dr. Huyen Ignacio. 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 · MRI ordered   · Start high dose Decadron  · Neurosurgery consulted   · Palliative consulted     Acute hypoxic respiratory failure secondary to   Pneumonia   Possible COPD  Tobacco abuse disorder   · COVID-19 ruled out   · Wean O2 for sats >93%  · CXR:  Stable right infrahilar opacity is concerning for underlying neoplasm. Potential right paratracheal lymphadenopathy. Correlation with chest CT is recommended following resolution of the COVID 19 status. · CT chest:  Mediastinal, right hilar, and retroperitoneal lymphadenopathy. Right lower lobe mass lesion as well as postobstructive consolidation. Interstitial infiltrate is noted throughout the right upper lobe. Pleural-based mass lesions throughout the right upper lobe. Hepatic metastases. Left adrenal masses concerning for metastatic disease. Marked distention of the bladder with soft tissue nodule involving the left bladder wall may represent an omental metastasis. · BC NGTD  · Procalcitonin 33.67  · Continue IV Rocephin and PO doxycycline   · Appreciate pulmonary input     GUANAKO on CKD IV  · Cr 2.84; improved from 3.80 on admission   · Maintain rodriguez d/t urinary retention  · Continue Flomax  · Continue IVF   · Avoid nephrotoxins  · Appreciate nephrology input     Acute pancreatitis  Elevated LFTs  Hepatomegaly  · Lipase >3,000  · , , ; all increasing   · CT abd/pel:  Mediastinal, right hilar, and retroperitoneal lymphadenopathy.  Right lower lobe mass lesion as well as postobstructive consolidation. Interstitial infiltrate is noted throughout the right upper lobe. Pleural-based mass lesions throughout the right upper lobe. Hepatic metastases. Left adrenal masses concerning for metastatic disease. Marked distention of the bladder with soft tissue nodule involving the left bladder wall may represent an omental metastasis. · MRCP ordered  · Appreciate GI input   · NPO  · Abd US pending     Chronic systolic heart failure, diagnosed 02/2020  Secondary hyperaldosteronism d/t HF  Elevated troponin   Hx of SVT  · Troponin 0.35->0.33->0.31->0.29  · ECHO with EF 25-30% (02/2020)  · Continue BB     18.5 - 24.9 Normal weight / Body mass index is 20.23 kg/m². Code status: Full  Prophylaxis: Hep SQ  Recommended Disposition: TBD     Subjective:     Chief Complaint / Reason for Physician Visit  \"If you punch me in the stomach I wont feel it. \"  Discussed with RN events overnight. Review of Systems:  Symptom Y/N Comments  Symptom Y/N Comments   Fever/Chills n   Chest Pain n    Poor Appetite n   Edema     Cough    Abdominal Pain     Sputum    Joint Pain     SOB/GRIFFIN n   Pruritis/Rash     Nausea/vomit    Tolerating PT/OT     Diarrhea    Tolerating Diet y    Constipation    Other       Could NOT obtain due to:      Objective:     VITALS:   Last 24hrs VS reviewed since prior progress note.  Most recent are:  Patient Vitals for the past 24 hrs:   Temp Pulse Resp BP SpO2   04/20/20 0942  (!) 106  105/66    04/20/20 0800 97.9 °F (36.6 °C) (!) 103 16 108/64 96 %   04/20/20 0800 97.9 °F (36.6 °C)  16  96 %   04/20/20 0751 97.2 °F (36.2 °C) (!) 108 20 108/64 99 %   04/20/20 0428    99/68    04/20/20 0356 96.4 °F (35.8 °C) 99 20 (!) 82/68 98 %   04/19/20 2309 97.5 °F (36.4 °C) 89 18 112/67 100 %   04/19/20 1925 97.7 °F (36.5 °C) 83 18 98/67 96 %   04/19/20 1550 98 °F (36.7 °C) 98 16 101/63 95 %   04/19/20 1453    103/70    04/19/20 1151 97.9 °F (36.6 °C) 86 17 107/75 96 %       Intake/Output Summary (Last 24 hours) at 4/20/2020 1015  Last data filed at 4/19/2020 1401  Gross per 24 hour   Intake    Output 425 ml   Net -425 ml        PHYSICAL EXAM:  General: Chronically ill-appearing, frail, AA male.     EENT:  EOMI. Anicteric sclerae. MMM  Resp:  CTA bilaterally, no wheezing or rales. No accessory muscle use  CV:  Tachycardic rhythm,  No edema  GI:  Firm,  +Bowel sounds, hepatomegaly   Neurologic:  A&O, paraplegia to BLE  Psych:   Fair insight. Not anxious nor agitated  Skin:  No rashes. No jaundice    Reviewed most current lab test results and cultures  YES  Reviewed most current radiology test results   YES  Review and summation of old records today    NO  Reviewed patient's current orders and MAR    YES  PMH/SH reviewed - no change compared to H&P  ________________________________________________________________________  Care Plan discussed with:    Comments   Patient x    Family      RN x    Care Manager     Consultant                        Multidiciplinary team rounds were held today with , nursing, pharmacist and clinical coordinator. Patient's plan of care was discussed; medications were reviewed and discharge planning was addressed. ________________________________________________________________________  Total NON critical care TIME:  25   Minutes    Total CRITICAL CARE TIME Spent:   Minutes non procedure based      Comments   >50% of visit spent in counseling and coordination of care     ________________________________________________________________________  Emelina Burk NP     Procedures: see electronic medical records for all procedures/Xrays and details which were not copied into this note but were reviewed prior to creation of Plan. LABS:  I reviewed today's most current labs and imaging studies.   Pertinent labs include:  Recent Labs     04/20/20  0422 04/19/20  0329 04/18/20  0415   WBC 9.8 9.4 9.5   HGB 14.1 14.1 15.0   HCT 42.1 42.2 44.0    214 213     Recent Labs     04/20/20  0422 04/19/20  0329 04/18/20  0415   * 135* 136   K 5.3* 5.3* 5.5*    107 108   CO2 21 18* 20*   GLU 93 94 121*   BUN 81* 85* 92*   CREA 2.84* 3.19* 3.31*   CA 8.4* 8.6 8.3*   MG 2.6* 2.9* 3.0*   PHOS 3.5 3.3 3.8   ALB 2.6* 2.6* 2.6*   TBILI 1.9* 1.1* 1.1*   SGOT 489* 404* 388*   * 257* 254*       Signed: Varsha Malik NP

## 2020-04-20 NOTE — PROGRESS NOTES
Spiritual Care Assessment/Progress Note  [facility]      NAME: Sumeet Genao      MRN: [id number]  AGE: 64 y.o.  SEX: male  Advent Affiliation: No preference   Language: English     4/20/2020     Total Time (in minutes): 10     Spiritual Assessment begun in [facility] through conversation with:         [x]Patient        [] Family    [] Friend(s)        Reason for Consult: Palliative Care, Initial/Spiritual Assessment     Spiritual beliefs: (Please include comment if needed)     [x] Identifies with a pablo tradition:         [] Supported by a pablo community:            [] Claims no spiritual orientation:           [] Seeking spiritual identity:                [] Adheres to an individual form of spirituality:           [] Not able to assess:                           Identified resources for coping:      [x] Prayer                               [] Music                  [] Guided Imagery     [] Family/friends                 [] Pet visits     [] Devotional reading                         [] Unknown     [] Other                                              Interventions offered during this visit: (See comments for more details)    Patient Interventions: Affirmation of emotions/emotional suffering, Prayer (assurance of), Prayer (actual)           Plan of Care:     [] Support spiritual and/or cultural needs    [] Support AMD and/or advance care planning process      [] Support grieving process   [] Coordinate Rites and/or Rituals    [] Coordination with community clergy   [] No spiritual needs identified at this time   [] Detailed Plan of Care below (See Comments)  [] Make referral to Music Therapy  [] Make referral to Pet Therapy     [] Make referral to Addiction services  [] Make referral to [facility]  [] Make referral to Spiritual Care Partner  [] No future visits requested        [x] Follow up visits as needed     Comments: Visited with Mr. Annalee Jay for initial spiritual assessment. Offered emotional support and explored any spiritual needs. Pt requested prayer, which was offered at bedside. Assured pt of ongoing  support as needed/desired.     Megan Alfredo, Palliative

## 2020-04-20 NOTE — PROGRESS NOTES
Oncology End of Shift Note      Bedside shift change report given to CAM yang (incoming nurse) by Praful Beckwith (outgoing nurse) on Naye Felt. Report included the following information SBAR, Kardex and MAR. Shift Summary:   Patient tolerated care fairly throughout shift. Complaints of pain treated with PRN medications (see PRN MAR). Upon assessing the patient the patient complained of decreased censation on their lower extremities. Code stroke called and imaging ordered and completed. Patient presented with a low BP upon assessing post imaging. NP zac notified and stated to reassess in 30 minutes and BP medications discontinued (see Vitals Flowesheet). Issues for Physician to Address:       Patient on Cardiac Monitoring?     [x] Yes  [] No    Rhythm: Sinus tach/Afib         Shift Events        Praful Beckwith

## 2020-04-20 NOTE — PROGRESS NOTES
RAPID RESPONSE TEAM- Follow Up     Rounded on patient due to recent rapid response 4/18. Patient is in bed, resting, NAD. No RRT interventions indicated at this time. Please call back if needed. Diana Hayward RN  Ext. [phone]    Vitals w/ MEWS Score (last day)     Date/Time MEWS Score Pulse Resp Temp BP Level of Consciousness SpO2    04/19/20 1925    83  18  97.7 °F (36.5 °C)  98/67    96 %    04/19/20 1550  1  98  16  98 °F (36.7 °C)  101/63  Alert  95 %    04/19/20 1453          103/70  Alert      04/19/20 1151  1  86  17  97.9 °F (36.6 °C)  107/75  Alert  96 %    04/19/20 0840  1  66  18  98 °F (36.7 °C)  130/73  Alert  96 %    04/19/20 0344  1  99  18  98 °F (36.7 °C)  123/83  Alert  97 %    04/18/20 2306  1  97  18  97.8 °F (36.6 °C)  101/74  Alert  97 %    04/18/20 2037  1  88  18  98 °F (36.7 °C)  114/79  Alert  95 %    04/18/20 1747    (!) 111      108/78        04/18/20 1500  3  (!) 112  18  98.1 °F (36.7 °C)  111/85  Alert  97 %    04/18/20 1349    (!) 120  18    (!) 121/92  Alert  97 %    04/18/20 1348    (!) 120      (!) 121/92        04/18/20 1148  1  66  16  97.4 °F (36.3 °C)  112/80  Alert  96 %    04/18/20 0419  1  60  16  98 °F (36.7 °C)  126/75  Alert  97 %          Results for Gib Mcgraw (MRN [id number]) as of 4/19/2020 22:30   Ref. Range 4/17/2020 16:32 4/17/2020 20:26 4/18/2020 12:00 4/18/2020 17:43   Troponin-I, Qt. Latest Ref Range: <0.05 ng/mL 0.35 (H) 0.33 (H) 0.31 (H) 0.29 (H)   Results for Gib Mcgraw (MRN [id number]) as of 4/19/2020 22:30   Ref.  Range 4/17/2020 16:32 4/18/2020 12:05   Lactic acid Latest Ref Range: 0.4 - 2.0 MMOL/L 4.2 (HH) 1.8

## 2020-04-20 NOTE — PROGRESS NOTES
Problem: Falls - Risk of  Goal: *Absence of Falls  Description: Document Kye Rodriguez Fall Risk and appropriate interventions in the flowsheet.   Outcome: Progressing Towards Goal  Note: Fall Risk Interventions:  Mobility Interventions: Bed/chair exit alarm         Medication Interventions: Bed/chair exit alarm    Elimination Interventions: Call light in reach    History of Falls Interventions: Bed/chair exit alarm

## 2020-04-21 ENCOUNTER — APPOINTMENT (OUTPATIENT)
Dept: CT IMAGING | Age: 60
DRG: 343 | End: 2020-04-21
Attending: PSYCHIATRY & NEUROLOGY
Payer: MEDICAID

## 2020-04-21 ENCOUNTER — APPOINTMENT (OUTPATIENT)
Dept: MRI IMAGING | Age: 60
DRG: 343 | End: 2020-04-21
Attending: PHYSICIAN ASSISTANT
Payer: MEDICAID

## 2020-04-21 LAB
ALBUMIN SERPL ELPH-MCNC: 2.8 G/DL (ref 2.9–4.4)
ALBUMIN/GLOB SERPL: 1 {RATIO} (ref 0.7–1.7)
ALPHA1 GLOB SERPL ELPH-MCNC: 0.4 G/DL (ref 0–0.4)
ALPHA2 GLOB SERPL ELPH-MCNC: 0.7 G/DL (ref 0.4–1)
ANION GAP SERPL CALC-SCNC: 10 MMOL/L (ref 5–15)
B-GLOBULIN SERPL ELPH-MCNC: 0.7 G/DL (ref 0.7–1.3)
BUN SERPL-MCNC: 85 MG/DL (ref 6–20)
BUN/CREAT SERPL: 31 (ref 12–20)
CALCIUM SERPL-MCNC: 7.9 MG/DL (ref 8.5–10.1)
CHLORIDE SERPL-SCNC: 103 MMOL/L (ref 97–108)
CO2 SERPL-SCNC: 24 MMOL/L (ref 21–32)
CREAT SERPL-MCNC: 2.76 MG/DL (ref 0.7–1.3)
ERYTHROCYTE [DISTWIDTH] IN BLOOD BY AUTOMATED COUNT: 15 % (ref 11.5–14.5)
GAMMA GLOB SERPL ELPH-MCNC: 1 G/DL (ref 0.4–1.8)
GLOBULIN SER CALC-MCNC: 2.8 G/DL (ref 2.2–3.9)
GLUCOSE SERPL-MCNC: 123 MG/DL (ref 65–100)
HCT VFR BLD AUTO: 39.7 % (ref 36.6–50.3)
HGB BLD-MCNC: 13.3 G/DL (ref 12.1–17)
LIPASE SERPL-CCNC: 2184 U/L (ref 73–393)
M PROTEIN SERPL ELPH-MCNC: 0.5 G/DL
MCH RBC QN AUTO: 34.1 PG (ref 26–34)
MCHC RBC AUTO-ENTMCNC: 33.5 G/DL (ref 30–36.5)
MCV RBC AUTO: 101.8 FL (ref 80–99)
NRBC # BLD: 0 K/UL (ref 0–0.01)
NRBC BLD-RTO: 0 PER 100 WBC
PLATELET # BLD AUTO: 156 K/UL (ref 150–400)
PMV BLD AUTO: 10.4 FL (ref 8.9–12.9)
POTASSIUM SERPL-SCNC: 5.4 MMOL/L (ref 3.5–5.1)
PROT SERPL-MCNC: 5.6 G/DL (ref 6–8.5)
RBC # BLD AUTO: 3.9 M/UL (ref 4.1–5.7)
SODIUM SERPL-SCNC: 137 MMOL/L (ref 136–145)
WBC # BLD AUTO: 7.5 K/UL (ref 4.1–11.1)

## 2020-04-21 PROCEDURE — 74011250637 HC RX REV CODE- 250/637: Performed by: GENERAL ACUTE CARE HOSPITAL

## 2020-04-21 PROCEDURE — 74011000258 HC RX REV CODE- 258: Performed by: INTERNAL MEDICINE

## 2020-04-21 PROCEDURE — 74011250637 HC RX REV CODE- 250/637: Performed by: INTERNAL MEDICINE

## 2020-04-21 PROCEDURE — 74011000250 HC RX REV CODE- 250: Performed by: PSYCHIATRY & NEUROLOGY

## 2020-04-21 PROCEDURE — 70450 CT HEAD/BRAIN W/O DYE: CPT

## 2020-04-21 PROCEDURE — 65660000000 HC RM CCU STEPDOWN

## 2020-04-21 PROCEDURE — 74011250636 HC RX REV CODE- 250/636: Performed by: INTERNAL MEDICINE

## 2020-04-21 PROCEDURE — 74011000258 HC RX REV CODE- 258: Performed by: PSYCHIATRY & NEUROLOGY

## 2020-04-21 PROCEDURE — 94760 N-INVAS EAR/PLS OXIMETRY 1: CPT

## 2020-04-21 PROCEDURE — 36415 COLL VENOUS BLD VENIPUNCTURE: CPT

## 2020-04-21 PROCEDURE — 85027 COMPLETE CBC AUTOMATED: CPT

## 2020-04-21 PROCEDURE — 74011000250 HC RX REV CODE- 250: Performed by: INTERNAL MEDICINE

## 2020-04-21 PROCEDURE — C9113 INJ PANTOPRAZOLE SODIUM, VIA: HCPCS | Performed by: PSYCHIATRY & NEUROLOGY

## 2020-04-21 PROCEDURE — 80048 BASIC METABOLIC PNL TOTAL CA: CPT

## 2020-04-21 PROCEDURE — 77010033678 HC OXYGEN DAILY

## 2020-04-21 PROCEDURE — 74011250637 HC RX REV CODE- 250/637: Performed by: NURSE PRACTITIONER

## 2020-04-21 PROCEDURE — 74011250636 HC RX REV CODE- 250/636: Performed by: GENERAL ACUTE CARE HOSPITAL

## 2020-04-21 PROCEDURE — 83690 ASSAY OF LIPASE: CPT

## 2020-04-21 PROCEDURE — 74011000258 HC RX REV CODE- 258: Performed by: GENERAL ACUTE CARE HOSPITAL

## 2020-04-21 PROCEDURE — 74011250636 HC RX REV CODE- 250/636: Performed by: PSYCHIATRY & NEUROLOGY

## 2020-04-21 RX ORDER — FUROSEMIDE 40 MG/1
40 TABLET ORAL ONCE
Status: COMPLETED | OUTPATIENT
Start: 2020-04-21 | End: 2020-04-21

## 2020-04-21 RX ORDER — SODIUM CHLORIDE 9 MG/ML
75 INJECTION, SOLUTION INTRAVENOUS CONTINUOUS
Status: DISCONTINUED | OUTPATIENT
Start: 2020-04-21 | End: 2020-04-24 | Stop reason: HOSPADM

## 2020-04-21 RX ORDER — PANTOPRAZOLE SODIUM 40 MG/1
40 TABLET, DELAYED RELEASE ORAL
Status: DISCONTINUED | OUTPATIENT
Start: 2020-04-22 | End: 2020-04-24 | Stop reason: HOSPADM

## 2020-04-21 RX ADMIN — HEPARIN SODIUM 5000 UNITS: 5000 INJECTION INTRAVENOUS; SUBCUTANEOUS at 21:20

## 2020-04-21 RX ADMIN — METOPROLOL TARTRATE 50 MG: 50 TABLET, FILM COATED ORAL at 17:29

## 2020-04-21 RX ADMIN — METOPROLOL TARTRATE 50 MG: 50 TABLET, FILM COATED ORAL at 09:50

## 2020-04-21 RX ADMIN — MORPHINE SULFATE 1 MG: 2 INJECTION, SOLUTION INTRAMUSCULAR; INTRAVENOUS at 20:10

## 2020-04-21 RX ADMIN — CEFTRIAXONE 1 G: 1 INJECTION, POWDER, FOR SOLUTION INTRAMUSCULAR; INTRAVENOUS at 21:22

## 2020-04-21 RX ADMIN — Medication 1 CAPSULE: at 09:50

## 2020-04-21 RX ADMIN — TAMSULOSIN HYDROCHLORIDE 0.4 MG: 0.4 CAPSULE ORAL at 09:50

## 2020-04-21 RX ADMIN — DEXAMETHASONE SODIUM PHOSPHATE 20 MG: 4 INJECTION, SOLUTION INTRA-ARTICULAR; INTRALESIONAL; INTRAMUSCULAR; INTRAVENOUS; SOFT TISSUE at 02:17

## 2020-04-21 RX ADMIN — MORPHINE SULFATE 1 MG: 2 INJECTION, SOLUTION INTRAMUSCULAR; INTRAVENOUS at 00:09

## 2020-04-21 RX ADMIN — Medication 10 ML: at 21:22

## 2020-04-21 RX ADMIN — Medication 10 ML: at 05:25

## 2020-04-21 RX ADMIN — DOCUSATE SODIUM 100 MG: 100 CAPSULE, LIQUID FILLED ORAL at 09:50

## 2020-04-21 RX ADMIN — SODIUM CHLORIDE 40 MG: 9 INJECTION, SOLUTION INTRAMUSCULAR; INTRAVENOUS; SUBCUTANEOUS at 10:00

## 2020-04-21 RX ADMIN — DOXYCYCLINE HYCLATE 100 MG: 100 TABLET, COATED ORAL at 21:17

## 2020-04-21 RX ADMIN — THERA TABS 1 TABLET: TAB at 09:50

## 2020-04-21 RX ADMIN — HYDROCODONE BITARTRATE AND ACETAMINOPHEN 1 TABLET: 5; 325 TABLET ORAL at 02:17

## 2020-04-21 RX ADMIN — SODIUM BICARBONATE: 84 INJECTION, SOLUTION INTRAVENOUS at 10:00

## 2020-04-21 RX ADMIN — HEPARIN SODIUM 5000 UNITS: 5000 INJECTION INTRAVENOUS; SUBCUTANEOUS at 13:46

## 2020-04-21 RX ADMIN — Medication 10 ML: at 13:35

## 2020-04-21 RX ADMIN — FUROSEMIDE 40 MG: 40 TABLET ORAL at 14:00

## 2020-04-21 RX ADMIN — DOCUSATE SODIUM 100 MG: 100 CAPSULE, LIQUID FILLED ORAL at 17:28

## 2020-04-21 RX ADMIN — SODIUM CHLORIDE 100 ML/HR: 900 INJECTION, SOLUTION INTRAVENOUS at 14:00

## 2020-04-21 RX ADMIN — MORPHINE SULFATE 1 MG: 2 INJECTION, SOLUTION INTRAMUSCULAR; INTRAVENOUS at 13:36

## 2020-04-21 RX ADMIN — THIAMINE HCL TAB 100 MG 100 MG: 100 TAB at 09:50

## 2020-04-21 RX ADMIN — DEXAMETHASONE SODIUM PHOSPHATE 20 MG: 4 INJECTION, SOLUTION INTRA-ARTICULAR; INTRALESIONAL; INTRAMUSCULAR; INTRAVENOUS; SOFT TISSUE at 10:00

## 2020-04-21 RX ADMIN — DOXYCYCLINE HYCLATE 100 MG: 100 TABLET, COATED ORAL at 09:50

## 2020-04-21 RX ADMIN — DEXAMETHASONE SODIUM PHOSPHATE 20 MG: 4 INJECTION, SOLUTION INTRA-ARTICULAR; INTRALESIONAL; INTRAMUSCULAR; INTRAVENOUS; SOFT TISSUE at 20:13

## 2020-04-21 RX ADMIN — DEXAMETHASONE SODIUM PHOSPHATE 20 MG: 4 INJECTION, SOLUTION INTRA-ARTICULAR; INTRALESIONAL; INTRAMUSCULAR; INTRAVENOUS; SOFT TISSUE at 13:47

## 2020-04-21 NOTE — ACP (ADVANCE CARE PLANNING)
Advance Care Planning Note      NAME: Ling Shane   :  1960   MRN:  [id number]     Date/Time:  2020 8:46 AM    Active Diagnoses:  Hospital Problems  Date Reviewed: 2020          Codes Class Noted POA    Paraplegia, complete ([facility] [address] ICD-10-CM: G82.21  ICD-9-CM: 344.1  2020 Yes        Spinal cord compression due to malignant neoplasm metastatic to spine [facility]) ICD-10-CM: G95.29, C79.51  ICD-9-CM: 336.9, 198.5  2020 Yes        Acute respiratory failure with hypoxia [facility]) ICD-10-CM: J96.01  ICD-9-CM: 518.81  2020 Unknown              These active diagnoses are of sufficient risk that focused discussion on advance care planning is indicated in order to allow the patient to thoughtfully consider personal goals of care, and if situations arise that prevent the ability to personally give input, to ensure appropriate representation of their personal desires for different levels and aggressiveness of care. Discussion:   Code status addressed and wants to be a Full Code. Patient wants central line and vasopressors if needed. Patient would also want a feeding tube, if needed, for nutritional support. Patient  would like to assign Neil Valdes, son  as the surrogate decision maker. Persons present and participating in discussion: Dillon Fleming NP,       Time Spent:   Total time spent face-to-face in education and discussion:   16  minutes.          Dillon Brown NP   Hospitalist

## 2020-04-21 NOTE — ROUTINE PROCESS
RAPID RESPONSE TEAM- Follow Up Rounded on patient due to recent rapid response. Patient is in bed, alert, NAD. Assessment at time of call revealed sudden bilateral leg weakness, which now appears to be worsening. No RRT interventions indicated at this time. Please call back if needed. Tish Alcaraz, CAM Yates Vitals w/ MEWS Score (last day) Date/Time MEWS Score Pulse Resp Temp BP Level of Consciousness SpO2  
 04/21/20 0744    (!) 106  16  98 °F (36.7 °C)  109/61    94 % 04/21/20 0300  2  (!) 110  16  98.1 °F (36.7 °C)  107/61  Alert  99 % 04/21/20 0006    98      97/64      
 04/20/20 2239  2  (!) 103  16  98.1 °F (36.7 °C)  105/66  Alert  95 % 04/20/20 2005  3  (!) 108  18  97.7 °F (36.5 °C)  94/64  Alert  97 % 04/20/20 2000    (!) 103            
 04/20/20 1749          104/59      
 04/20/20 1622          102/56      
 04/20/20 1548    92      100/56  Alert    
 04/20/20 1418  2  90  18  97.6 °F (36.4 °C)  92/60  Alert  97 % 04/20/20 1320          (!) 81/57      
 04/20/20 1248    99      (!) 87/57  Alert    
 04/20/20 1042  2  (!) 105  18  98.1 °F (36.7 °C)  106/67  Alert  98 % 04/20/20 0942    (!) 106      105/66  Alert    
 04/20/20 08:00:28  2  (!) 103  16  97.9 °F (36.6 °C)  108/64  Alert  96 % 04/20/20 08:00:26      16  97.9 °F (36.6 °C)    Alert  96 % 04/20/20 0751  2  (!) 108  20  97.2 °F (36.2 °C)  108/64  Alert  99 % 04/20/20 0428          99/68      
 04/20/20 0356  2  99  20  96.4 °F (35.8 °C)  (!) 82/68  Alert  98 % Results for Cyrilla Alu (MRN [id number]) as of 4/21/2020 08:26 Ref. Range 4/17/2020 16:32 4/18/2020 04:15 4/19/2020 03:29 4/20/2020 04:22 4/21/2020 02:36 Glucose Latest Ref Range: 65 - 100 mg/dL 129 (H) 121 (H) 94 93 123 (H) Procedure: MRI [facility] SPINE WO CONT Epifanio Speed Procedure Date: 04/20/2020 12:13 PM  
Accession Number: [id number] Order Number: [id number]  
  
Ordering Diagnosis:    
Reason for Exam: metastatic cord compression Performing Department: [facility] MRI Patient Class: INPATIENT  
  
   
Study Result   
  INDICATION:  metastatic cord compression  
  
COMPARISON: CT April 17, 2020 
  
TECHNIQUE: Multiplanar multisequence acquisition of the thoracic spine.  
  
CONTRAST:  None. 
  
FINDINGS: 
  
Significant motion artifact. Normal alignment of the thoracic spine. Multiple 
areas of abnormal marrow signal involving the T1, T5, T7, T8, T9 vertebral 
bodies, as well as left T2 transverse process/second rib, probable T3 and T4 
spinous processes and likely bilateral ribs. No evidence of acute or subacute 
compression fracture however. Heterogeneous appearance of the thoracic spinal 
cord, beginning at the level of approximately T3 and extending to the 
thoracolumbar junction, with probable cord expansion and edema. IV contrast was 
not administered. Enlarged thoracic lymph nodes, right hilar/lower lobe mass, 
numerous hepatic metastases in left adrenal mass better seen on recent CT 
examination. 
  
IMPRESSION IMPRESSION: 
  
1. Severe motion artifact. 2. Heterogeneous appearance of the thoracic spinal cord from T3 to the 
thoracolumbar junction as above, concerning for metastatic involvement which may 
be leptomeningeal or intramedullary, particularly given the abnormal appearance 
on lumbar spine MRI. 3. Osseous metastatic disease without compression fracture. Epidural tumor 
extension cannot be completely excluded. 4. See recent CT chest abdomen pelvis findings regarding lung mass, adenopathy, 
hepatic and adrenal metastases. 
  
Ordering physician notified via [employer] system upon interpretation. 
  
23X

## 2020-04-21 NOTE — PROGRESS NOTES
Per chart review and discussion with nursing the patient is to have a Palliative care consult. Will defer PT intervention pending results of palliative care consult.      Carlos Hays, PT

## 2020-04-21 NOTE — PROGRESS NOTES
Consult dictated: Patient has a sensory level around T7-T8 just below the nipples bilaterally, this looks like cord compression from metastatic disease, ordering stat MRI and have talked MRI scan, started on high-dose Decadron and consulting neurosurgery, may need radiation therapy but we do not really have a tissue diagnosis yet, difficult case. Consult  REFERRED BY:  None    CHIEF COMPLAINT: 1 month history of weakness and numbness in his legs      Subjective: Madalyn Giang is a 64 y.o. right-handed -American male we are asked to evaluate at the request of Dr. Toño Landeros of sudden leg weakness bilaterally and became complete early morning April 20, 2020., and reported by the nurses and we are asked to evaluate for evaluation. Patient MRI shows diffuse cord enlargement and meningeal enhancement throughout large portions of the thoracic spine, and diffuse bony metastatic disease throughout the spine, but no clear evidence of cord compression of the spinal canal.  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 Neurosurgery says they have nothing to offer. Patient on high-dose Decadron 20 mg every 6 hours and no improvement at all. I have asked radiation therapy to see the patient to consider if there is any therapy they can give, and they offered empiric palliative radiation but the patient refused. I have asked hematology to see the patient to see if there is any medicinal therapy available, and they will see the patient later today. I told the patient intrathecal therapy would be an option if he wanted it, but that would require either inserting a port into the CNS, repeated spinal taps, and he refused that. He refuses to get an MRI of the brain just to check to make sure there is no brain mets 2. We will get a CAT scan he will will do that.   We cannot give him contrast.  Patient has made no improvement in his paraplegia and is unchanged. The patient has lesions in his lung described as mediastinal, right hilar, and retroperitoneal lymphadenopathy. Right lower lobe mass is seen, interstitial infiltrate. Throughout the right upper lobe, pleural-based masses are also noted in the right upper lobe, he has hepatic metastases left adrenal masses marked distention of the bladder with soft tissue nodule involving the bladder and possible omental metastases. Patient is not a smoker. Patient has been complaining of back pain and gradual progressive weakness and numbness in his legs for the last month, having difficulty walking and having a drinks of around the last month. He denies any recent fall but did have an infection on admission. He is COVID negative. Patient is an ex-smoker. He denies any difficulty speaking swallowing or neck pain or headache or cranial nerve problems. He has difficulty with bowel or bladder control and movements. He has a markedly distended bladder on his CT scans. Patient has a sensory level around T7-T8 on neurologic examination complete flaccid paraplegia from the lower levels down. He has no difficulty with weakness or numbness in his arms. Again this morning was the first time this paraplegia was discovered. Past Medical History:   Diagnosis Date    Hypotension     Vertigo       History reviewed. No pertinent surgical history. Family History   Problem Relation Age of Onset    Heart Disease Mother     Other Father         Unknown    Other Child         Gunshot wounds      Social History     Tobacco Use    Smoking status: Former Smoker    Smokeless tobacco: Never Used   Substance Use Topics    Alcohol use: Yes     Comment: 2-3 cans of beer a day.           Current Facility-Administered Medications:     [START ON 4/22/2020] pantoprazole (PROTONIX) tablet 40 mg, 40 mg, Oral, ACB, HardiGisell MD    0.9% sodium chloride infusion, 100 mL/hr, IntraVENous, CONTINUOUS, Teri BAEZA MD  [address] [place] [address] furosemide (LASIX) tablet 40 mg, 40 mg, Oral, ONCE, Darvin BAEZA MD    dexamethasone (DECADRON) 20 mg in 0.9% sodium chloride 50 mL IVPB, 20 mg, IntraVENous, Q6H, Lolly Bernard MD, 20 mg at 04/21/20 1347    tamsulosin (FLOMAX) capsule 0.4 mg, 0.4 mg, Oral, DAILY, Brenda Lou, Kiki Chi MD, 0.4 mg at 04/20/20 5187    thiamine mononitrate (B-1) tablet 100 mg, 100 mg, Oral, DAILY, Antonio Angel MD, 100 mg at 04/20/20 0947    therapeutic multivitamin (THERAGRAN) tablet 1 Tab, 1 Tab, Oral, DAILY, Antonio Angel MD, 1 Tab at 04/20/20 0947    lactobac ac& pc-s.therm-b.anim (PATRIZIA Q/RISAQUAD), 1 Cap, Oral, DAILY, Antonio Angel MD, 1 Cap at 04/20/20 2485    morphine injection 1 mg, 1 mg, IntraVENous, Q2H PRN, Arturo Hayes MD, 1 mg at 04/21/20 1336    metoprolol tartrate (LOPRESSOR) tablet 50 mg, 50 mg, Oral, BID, Tere Murillo NP, 50 mg at 04/20/20 0947    doxycycline (VIBRA-TABS) tablet 100 mg, 100 mg, Oral, Q12H, Arturo Hayes MD, 100 mg at 04/20/20 2104    metoprolol (LOPRESSOR) injection 2.5 mg, 2.5 mg, IntraVENous, Q6H PRN, Arturo Hayes MD    sodium chloride (NS) flush 5-40 mL, 5-40 mL, IntraVENous, Q8H, Vaishnavi Sainz MD, 10 mL at 04/21/20 1335    sodium chloride (NS) flush 5-40 mL, 5-40 mL, IntraVENous, PRN, Loida Gagnon MD    cefTRIAXone (ROCEPHIN) 1 g in 0.9% sodium chloride (MBP/ADV) 50 mL, 1 g, IntraVENous, Q24H, Wetzel Koyanagi S, MD, Last Rate: 100 mL/hr at 04/20/20 2104, 1 g at 04/20/20 2104    HYDROcodone-acetaminophen (NORCO) 5-325 mg per tablet 1 Tab, 1 Tab, Oral, Q4H PRN, Loida Gagnon MD, 1 Tab at 04/21/20 0217    docusate sodium (COLACE) capsule 100 mg, 100 mg, Oral, BID, Loida Gagnon MD, 100 mg at 04/20/20 1750    heparin (porcine) injection 5,000 Units, 5,000 Units, SubCUTAneous, Q8H, Loida Gagnon MD, 5,000 Units at 04/21/20 1346    prochlorperazine (COMPAZINE) with saline injection 5 mg, 5 mg, IntraVENous, Q8H PRN, Loida Gagnon MD        No Known Allergies   MRI Results (most recent):  Results from [place] encounter on 04/17/20   MRI [facility] SPINE WO CONT    Narrative INDICATION:  metastatic cord compression     COMPARISON: CT April 17, 2020    TECHNIQUE: Multiplanar multisequence acquisition of the thoracic spine. CONTRAST:  None. FINDINGS:    Significant motion artifact. Normal alignment of the thoracic spine. Multiple  areas of abnormal marrow signal involving the T1, T5, T7, T8, T9 vertebral  bodies, as well as left T2 transverse process/second rib, probable T3 and T4  spinous processes and likely bilateral ribs. No evidence of acute or subacute  compression fracture however. Heterogeneous appearance of the thoracic spinal  cord, beginning at the level of approximately T3 and extending to the  thoracolumbar junction, with probable cord expansion and edema. IV contrast was  not administered. Enlarged thoracic lymph nodes, right hilar/lower lobe mass,  numerous hepatic metastases in left adrenal mass better seen on recent CT  examination. Impression IMPRESSION:    1. Severe motion artifact. 2. Heterogeneous appearance of the thoracic spinal cord from T3 to the  thoracolumbar junction as above, concerning for metastatic involvement which may  be leptomeningeal or intramedullary, particularly given the abnormal appearance  on lumbar spine MRI. 3. Osseous metastatic disease without compression fracture. Epidural tumor  extension cannot be completely excluded. 4. See recent CT chest abdomen pelvis findings regarding lung mass, adenopathy,  hepatic and adrenal metastases. Ordering physician notified via [employer] system upon interpretation. 23X         Results from [place] encounter on 04/17/20   MRI [facility] SPINE WO CONT    Narrative INDICATION:  metastatic cord compression     COMPARISON: CT April 17, 2020    TECHNIQUE: Multiplanar multisequence acquisition of the thoracic spine.      CONTRAST: None.    FINDINGS:    Significant motion artifact. Normal alignment of the thoracic spine. Multiple  areas of abnormal marrow signal involving the T1, T5, T7, T8, T9 vertebral  bodies, as well as left T2 transverse process/second rib, probable T3 and T4  spinous processes and likely bilateral ribs. No evidence of acute or subacute  compression fracture however. Heterogeneous appearance of the thoracic spinal  cord, beginning at the level of approximately T3 and extending to the  thoracolumbar junction, with probable cord expansion and edema. IV contrast was  not administered. Enlarged thoracic lymph nodes, right hilar/lower lobe mass,  numerous hepatic metastases in left adrenal mass better seen on recent CT  examination. Impression IMPRESSION:    1. Severe motion artifact. 2. Heterogeneous appearance of the thoracic spinal cord from T3 to the  thoracolumbar junction as above, concerning for metastatic involvement which may  be leptomeningeal or intramedullary, particularly given the abnormal appearance  on lumbar spine MRI. 3. Osseous metastatic disease without compression fracture. Epidural tumor  extension cannot be completely excluded. 4. See recent CT chest abdomen pelvis findings regarding lung mass, adenopathy,  hepatic and adrenal metastases. Ordering physician notified via [employer] system upon interpretation.     23X       Review of Systems:  A comprehensive review of systems was negative except for: Constitutional: positive for fatigue and malaise  Respiratory: positive for pleurisy/chest pain, dyspnea on exertion, emphysema or chronic bronchitis  Gastrointestinal: positive for reflux symptoms, nausea, vomiting, change in bowel habits, constipation and abdominal pain  Musculoskeletal: positive for myalgias, arthralgias, stiff joints, back pain and muscle weakness  Neurological: positive for paresthesia, coordination problems, gait problems and weakness   Vitals:    04/21/20 0006 04/21/20 0300 04/21/20 0744 04/21/20 1134   BP: 97/64 107/61 109/61 100/66   Pulse: 98 (!) 110 (!) 106 (!) 109   Resp:  16 16 16   Temp:  98.1 °F (36.7 °C) 98 °F (36.7 °C) 97.8 °F (36.6 °C)   SpO2:  99% 94% 93%   Weight:         Objective:     I      NEUROLOGICAL EXAM:    Appearance: The patient is poorly developed and nourished, provides a poor history and is in no acute distress. Mental Status: Oriented to time, place and person, and the president, cognitive function is slow but probably normal and speech is fluent and no aphasia or dysarthria. Mood and affect appropriate. Cranial Nerves:   Intact visual fields. Fundi are benign, disc are flat, no lesions seen on funduscopy. MARY, EOM's full, no nystagmus, no ptosis. Facial sensation is normal. Corneal reflexes are not tested. Facial movement is symmetric. Hearing is abnormal bilaterally. Palate is midline with normal sternocleidomastoid and trapezius muscles are normal. Tongue is midline. Neck without meningismus or bruits  Temporal arteries are not tender or enlarged  TMJ areas are not tender on palpation   Motor:  4/5 strength in upper proximal and distal muscles. Patient has a flaccid paraplegia below his nipple line. Normal bulk and decreased muscle tone. No fasciculations. Rapid alternating movement is symmetric and intact in the upper extremities, but no movement in lower extremities   Reflexes:   Deep tendon reflexes 1+/4 in the upper extremities, and absent in both lower extremities. No babinski or clonus present   Sensory:   Abnormal to touch, pinprick and vibration and temperature from mid to lower chest down. DSS is intact in the upper extremities   Gait:  Not testable. Tremor:   No tremor noted. Cerebellar:  Not testable cerebellar signs present on Romberg and tandem testing and finger-nose-finger exam is unremarkable. Neurovascular:  Normal heart sounds and regular rhythm, peripheral pulses decreased, and no carotid bruits. 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 Neurosurgery says they can do nothing for the patient. Palliative care would be the best treatment for this unfortunate gentleman. We will check a CT scan of the head just to see if there is any lesions there, because he refuses MRI scan. Have started the patient on high-dose Decadron 20 mg every 6 hours, and consulted neurosurgery, but there is nothing they can do. Difficult case and have discussed with the attendings, MRI scan, and other physicians. Patient high risk serious and progressive and permanent neurologic disability, dysfunction and possibly even death.   Prognosis very poor, we do not have tissue diagnosis yet,  Probably going to need palliative care or hospice  Signed By: Brook Salas MD     April 21, 2020       CC: None  FAX: None

## 2020-04-21 NOTE — INTERDISCIPLINARY ROUNDS
Oncology Interdisciplinary rounds were held today to discuss patient plan of care and outcomes. The following members were present: Nursing, Physician, Case Management, Pharmacy, and PT/OT Actual Length of Stay: 4 DRG GLOS: 4.2 Expected Length of Stay: 4d 4h 
 
 
 
               Plan            Discharge PT/OT Gastroenterology following, Nephrology consulted. Palliative care. Patient is homeless . ... DATE: TBA 
WHERE: TBA

## 2020-04-21 NOTE — CONSULTS
[address] [employer] [employer]     Inpatient Consultation    4/21/2020    Nancy Granados    Subjective:       History of Present Illness: Nancy Granados is a 64 y.o., male presenting with sudden onset of lower extremity weakness yesterday. He presented to the ED here and found on imaging to have metastatic disease in the lung, mediastinum, retroperinteum and bone, probably c/w disseminated lung cancer. MRI of the spine shows no epidural compression but diffuse metastatic disease. Neurologic exam finds an approximate T7-8 sensory level with flaccid paraplegia below this level. Neurosurgery consultation finds no identifiable extrinsic compression but feels the patient probably has intrathecal disease causing the neurologic deficit. He has started IV dexamethasone. COVID-19 has been ruled out. On questioning, the patient denies any change in his neurological status. He continues to have profound motor weakness in the lower extremities as well as sensory loss to the level of the mid thorax. He denies any other sites of bone pain. He denies dyspnea on exertion, or cough. He does note some right frontal headaches which have been present for the past few weeks and unchanged in character. 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 No pertinent surgical history.    No Known Allergies   Social History  Social History     Tobacco Use    Smoking status: Former Smoker    Smokeless tobacco: Never Used   Substance Use Topics  Alcohol use: Yes     Comment: 2-3 cans of beer a day. Family History   Problem Relation Age of Onset    Heart Disease Mother     Other Father         Unknown    Other Child         Gunshot wounds      Medications  Current Facility-Administered Medications   Medication    [START ON 4/22/2020] pantoprazole (PROTONIX) tablet 40 mg    dexamethasone (DECADRON) 20 mg in 0.9% sodium chloride 50 mL IVPB    tamsulosin (FLOMAX) capsule 0.4 mg    sodium bicarbonate (8.4%) 100 mEq in 0.45% sodium chloride 1,000 mL infusion    thiamine mononitrate (B-1) tablet 100 mg    therapeutic multivitamin (THERAGRAN) tablet 1 Tab    lactobac ac& pc-s.therm-b.anim (PATRIZIA Q/RISAQUAD)    morphine injection 1 mg    metoprolol tartrate (LOPRESSOR) tablet 50 mg    doxycycline (VIBRA-TABS) tablet 100 mg    metoprolol (LOPRESSOR) injection 2.5 mg    sodium chloride (NS) flush 5-40 mL    sodium chloride (NS) flush 5-40 mL    cefTRIAXone (ROCEPHIN) 1 g in 0.9% sodium chloride (MBP/ADV) 50 mL    HYDROcodone-acetaminophen (NORCO) 5-325 mg per tablet 1 Tab    docusate sodium (COLACE) capsule 100 mg    heparin (porcine) injection 5,000 Units    prochlorperazine (COMPAZINE) with saline injection 5 mg     Review of Systems:  Pertinent items are noted in the History of Present Illness. Objective:     Visit Vitals  /66   Pulse (!) 109   Temp 97.8 °F (36.6 °C)   Resp 16   Wt 62.1 kg (137 lb)   SpO2 93%   BMI 20.23 kg/m²         Physical Exam:   General appearance: alert, cooperative, no distress, appears stated age, somnolent but arousable  Neck: supple, symmetrical, trachea midline, no adenopathy, thyroid: not enlarged, symmetric, no tenderness/mass/nodules, no carotid bruit and no JVD  Lungs: clear to auscultation bilaterally  Abdomen: soft, non-tender.  Bowel sounds normal. No masses,  no organomegaly  Neurologic: Sensory:  complete sensory loss from the mid sternum distally,  Corresponding to approximately T7 level.  Motor: Flaccid paresis of the lower extremities. Assessment: This is a 46-year-old male presenting with a brief history of lower extremity motor weakness with the duration of at least 24 hr with flaccid paresis of the lower extremities as well as a T7 -T8 sensory level. Imaging demonstrates diffuse metastatic disease probably of pulmonary origin. MRI of the thoracic spine shows no extrinsic epidural compression corresponding to the sensory level and suspicion is that the patient has intrathecal metastasis at this level responsible for the neurological deficits. The patient has shown no improvement with dexamethasone. There is no histologic confirmation at this time    Plan:    Unfortunately, intervention at this point is unlikely to provide much neurologic benefit for the patient. I can target a specific area of the spine probably centering on T7 with several vertebral bodies above and below this level to treat the suspected lesion although this is, at best, and educated guess given the lack of radiographic guidance. I have offered delivering 8 Gy in a single fraction to this area and have given him a realistic expectation of outcome. At this time, he does not want to be treated with radiation. I fully support palliative care and hospice options. I will be happy to reevaluate him if he re-considers.       Signed By: Mishel Petersen MD    Radiation Oncology Service   [phone]    April 21, 2020

## 2020-04-21 NOTE — PROGRESS NOTES
I reviewed pertinent labs and imaging, and discussed /agreed on the plan of care with Dr. Yony Jimenez. Hospitalist Progress Note    NAME: Rolando Bruno   :  1960   MRN:  [id number]     Assessment / Plan:  New onset paraplegia around T7-T8  Metastatic disease to liver, lungs, bone adrenals, and retroperitoneal LNs  · Poor prognosis   · Widespread metastatic diease found during this admission   · Multiple spinal metastasis. New onset bilateral lower extremity paraplegia. Neurologic exam sensory level T7-T8  · MRI lumbar spine:  Imaging findings consistent with widespread osseous metastatic disease. Largest lesion at L4 and in the left iliac bone with large lesions at S1 and S2 as well. No associated acute/chronic compression deformity. No canal compromise. Atypical cord signal intensity at the L2 level. Metastatic focus not excluded. Possible nodular densities associated with cauda equina nerve roots. Intrathecal metastatic disease is not excluded. The patient was not administered IV contrast. Retroperitoneal adenopathy and hepatic masses. · MRI thorac spine: 1. Severe motion artifact. 2. Heterogeneous appearance of the thoracic spinal cord from T3 to the thoracolumbar junction as above, concerning for metastatic involvement which may be leptomeningeal or intramedullary, particularly given the abnormal appearance on lumbar spine MRI. 3. Osseous metastatic disease without compression fracture. Epidural tumor extension cannot be completely excluded. 4. See recent CT chest abdomen pelvis findings regarding lung mass, adenopathy, hepatic and adrenal metastases. · MRI cervical spine:  1. Significant technical artifact due to motion as above. 2. Osseous metastasis T1 vertebral body. No acute or subacute fracture. No abnormality involving the cervical spinal cord allowing for motion. See separate thoracic MRI report regarding cord abnormality in the upper thoracic region.   3. Partly visualized upper mediastinal and supraclavicular lymphadenopathy. · Continue high dose Decadron  · Neurosurgery states there is nothing they are able to do for patient   · Palliative consulted   · Appreciate oncology input   · Appreciate neurology input   · CT head negative  · Patient refused MRI of brain and LP  · Patient is not interested in radiation     Acute hypoxic respiratory failure secondary to   Pneumonia   Possible COPD  Tobacco abuse disorder   · COVID-19 ruled out   · Wean O2 for sats >93%  · CXR:  Stable right infrahilar opacity is concerning for underlying neoplasm. Potential right paratracheal lymphadenopathy. Correlation with chest CT is recommended following resolution of the COVID 19 status. · CT chest:  Mediastinal, right hilar, and retroperitoneal lymphadenopathy. Right lower lobe mass lesion as well as postobstructive consolidation. Interstitial infiltrate is noted throughout the right upper lobe. Pleural-based mass lesions throughout the right upper lobe. Hepatic metastases. Left adrenal masses concerning for metastatic disease. Marked distention of the bladder with soft tissue nodule involving the left bladder wall may represent an omental metastasis. · BC NGTD  · Procalcitonin 33.67  · Continue IV Rocephin and PO doxycycline   · Appreciate pulmonary input     GUANAKO on CKD IV  · Cr 2.76; improved from 3.80 on admission   · Maintain rodriguez d/t urinary retention  · Continue Flomax  · Continue IVF   · Avoid nephrotoxins  · Appreciate nephrology input     Acute pancreatitis  Elevated LFTs  Hepatomegaly  · Lipase 2,184; improved   · , , ; all increasing   · CT abd/pel:  Mediastinal, right hilar, and retroperitoneal lymphadenopathy. Right lower lobe mass lesion as well as postobstructive consolidation. Interstitial infiltrate is noted throughout the right upper lobe. Pleural-based mass lesions throughout the right upper lobe. Hepatic metastases. Left adrenal masses concerning for metastatic disease.  Marked distention of the bladder with soft tissue nodule involving the left bladder wall may represent an omental metastasis. · MRCP ordered  · Appreciate GI input   · NPO  · Abd US:  1. Diffuse hepatic metastases. 2.  No intrahepatic biliary dilation. 3.  Dilated common bile duct measuring 9 mm. 4.  Normal gallbladder    Chronic systolic heart failure, diagnosed 02/2020  Secondary hyperaldosteronism d/t HF  Elevated troponin   Hx of SVT  · Troponin 0.35->0.33->0.31->0.29  · ECHO with EF 25-30% (02/2020)  · Continue BB     18.5 - 24.9 Normal weight / Body mass index is 20.23 kg/m². Code status: Full  Prophylaxis: Hep SQ  Recommended Disposition: TBD- recommend transitioning to hospice care      Subjective:     Chief Complaint / Reason for Physician Visit  Follow-up paraplegia. Discussed with RN events overnight. Review of Systems:  Symptom Y/N Comments  Symptom Y/N Comments   Fever/Chills n   Chest Pain n    Poor Appetite n   Edema     Cough    Abdominal Pain     Sputum    Joint Pain     SOB/GRIFFIN n   Pruritis/Rash     Nausea/vomit    Tolerating PT/OT     Diarrhea    Tolerating Diet y    Constipation    Other       Could NOT obtain due to:      Objective:     VITALS:   Last 24hrs VS reviewed since prior progress note.  Most recent are:  Patient Vitals for the past 24 hrs:   Temp Pulse Resp BP SpO2   04/21/20 0744 98 °F (36.7 °C) (!) 106 16 109/61 94 %   04/21/20 0300 98.1 °F (36.7 °C) (!) 110 16 107/61 99 %   04/21/20 0006  98  97/64    04/20/20 2239 98.1 °F (36.7 °C) (!) 103 16 105/66 95 %   04/20/20 2005 97.7 °F (36.5 °C) (!) 108 18 94/64 97 %   04/20/20 2000  (!) 103      04/20/20 1749    104/59    04/20/20 1622    102/56    04/20/20 1548  92  100/56    04/20/20 1418 97.6 °F (36.4 °C) 90 18 92/60 97 %   04/20/20 1320    (!) 81/57    04/20/20 1248  99  (!) 87/57        Intake/Output Summary (Last 24 hours) at 4/21/2020 1133  Last data filed at 4/21/2020 0635  Gross per 24 hour   Intake  Output 1025 ml   Net -1025 ml        PHYSICAL EXAM:  General: Frail, chronically ill-appearing AA male. NAd     EENT:  EOMI. Anicteric sclerae. MMM  Resp:  CTA bilaterally, no wheezing or rales. No accessory muscle use  CV:  Tachycardic rhythm,  No edema  GI:  Firm,  +Bowel sounds, hepatomegaly   Neurologic:  A&O, paraplegia to BLE  Psych:   Fair insight. Not anxious nor agitated  Skin:  No rashes. No jaundice    Reviewed most current lab test results and cultures  YES  Reviewed most current radiology test results   YES  Review and summation of old records today    NO  Reviewed patient's current orders and MAR    YES  PMH/SH reviewed - no change compared to H&P  ________________________________________________________________________  Care Plan discussed with:    Comments   Patient x    Family      RN x    Care Manager     Consultant                        Multidiciplinary team rounds were held today with , nursing, pharmacist and clinical coordinator. Patient's plan of care was discussed; medications were reviewed and discharge planning was addressed. ________________________________________________________________________  Total NON critical care TIME:  25   Minutes    Total CRITICAL CARE TIME Spent:   Minutes non procedure based      Comments   >50% of visit spent in counseling and coordination of care     ________________________________________________________________________  Algis Prime, NP     Procedures: see electronic medical records for all procedures/Xrays and details which were not copied into this note but were reviewed prior to creation of Plan. LABS:  I reviewed today's most current labs and imaging studies.   Pertinent labs include:  Recent Labs     04/21/20  0236 04/20/20  0422 04/19/20  0329   WBC 7.5 9.8 9.4   HGB 13.3 14.1 14.1   HCT 39.7 42.1 42.2    178 214     Recent Labs     04/21/20 0236 04/20/20  0422 04/19/20  0329    134* 135*   K 5.4* 5.3* 5.3*    103 107   CO2 24 21 18*   * 93 94   BUN 85* 81* 85*   CREA 2.76* 2.84* 3.19*   CA 7.9* 8.4* 8.6   MG  --  2.6* 2.9*   PHOS  --  3.5 3.3   ALB  --  2.6* 2.6*   TBILI  --  1.9* 1.1*   SGOT  --  489* 404*   ALT  --  270* 257*       Signed: Summer Houston, NP

## 2020-04-21 NOTE — PROGRESS NOTES
Aware of progression of patient's deficits to patient not being paraplegic. Per chart review and discussion with nursing the patient is to have a Palliative care consult. Will defer OT intervention pending results of palliative care consult.

## 2020-04-21 NOTE — PROGRESS NOTES
Bedside shift change report given to Dimple Lazcano (oncoming nurse) by Lazaro Burton (offgoing nurse). Report included the following information SBAR, Kardex and MAR.

## 2020-04-21 NOTE — CONSULTS
[facility]  [address] [place], [address] [facility] [place], [address] [facility]  [phone]      Hematology/ Oncology Consult Note      Reason for consult: Efrem Leone is a 64 y.o. male who we have been asked to see by Dr. Lazarus Kennedy  for metastatic cancer. Subjective: Efrem Leone is a 64year old gentleman who presented to the ED on 4/17 with shortness of breath, Imaging revealed wide spread metastatic disease in the lung, mediastinum, retroperitoneal LN, adrenals and bone. Yesterday, he had sudden onset of lower extremity weakness. Imaging shows no epidural compression but diffuse metastatic disease. Neurosurgery consulted and feels his symptoms are not from cord compression but from intrathecal disease. CT head was negative. He is currently receiving IV dexamethasone, but sensory deficits are currently at mid thorax and he has burning pain of the hands. He is COVID negative. He was also discharged from [facility] in February for pneumonia and cardiomyopathy and refused a cardiac cath. Currently his is homeless and is unsure of the location of his nearest relatives. Review of Systems:  A comprehensive review of systems was negative except for that written in the History of Present Illness. Past Medical History:   Diagnosis Date    Hypotension     Vertigo      History reviewed. No pertinent surgical history. Family History   Problem Relation Age of Onset    Heart Disease Mother     Other Father         Unknown    Other Child         Gunshot wounds     Social History     Tobacco Use    Smoking status: Former Smoker    Smokeless tobacco: Never Used   Substance Use Topics    Alcohol use: Yes     Comment: 2-3 cans of beer a day.        Current Facility-Administered Medications   Medication Dose Route Frequency Provider Last Rate Last Dose    pantoprazole (PROTONIX) 40 mg in 0.9% sodium chloride 10 mL injection 40 mg IntraVENous DAILY Enmanuel Menard MD   40 mg at 04/21/20 1000    dexamethasone (DECADRON) 20 mg in 0.9% sodium chloride 50 mL IVPB  20 mg IntraVENous Q6H Enmanuel Menard MD   20 mg at 04/21/20 1000    tamsulosin (FLOMAX) capsule 0.4 mg  0.4 mg Oral DAILY Darrick Mattson MD   0.4 mg at 04/20/20 0947    sodium bicarbonate (8.4%) 100 mEq in 0.45% sodium chloride 1,000 mL infusion   IntraVENous CONTINUOUS Jacinta Pa MD 84 mL/hr at 04/21/20 1000      thiamine mononitrate (B-1) tablet 100 mg  100 mg Oral DAILY Yadira Brown MD   100 mg at 04/20/20 8904    therapeutic multivitamin [facility]) tablet 1 Tab  1 Tab Oral DAILY Yadira Brown MD   1 Tab at 04/20/20 8524    lactobac ac& pc-s.therm-b.anim (PATRIZIA Q/RISAQUAD)  1 Cap Oral DAILY Yadira Brown MD   1 Cap at 04/20/20 4089    morphine injection 1 mg  1 mg IntraVENous Q2H PRN Boyd Sin MD   1 mg at 04/21/20 0009    metoprolol tartrate (LOPRESSOR) tablet 50 mg  50 mg Oral BID Tere Murillo NP   50 mg at 04/20/20 0947    doxycycline (VIBRA-TABS) tablet 100 mg  100 mg Oral Q12H Boyd Sin MD   100 mg at 04/20/20 2104    metoprolol (LOPRESSOR) injection 2.5 mg  2.5 mg IntraVENous Q6H PRN Boyd Sin MD        sodium chloride (NS) flush 5-40 mL  5-40 mL IntraVENous Q8H Katelyn Pace MD   10 mL at 04/21/20 0525    sodium chloride (NS) flush 5-40 mL  5-40 mL IntraVENous PRN Katelyn Pace MD        cefTRIAXone (ROCEPHIN) 1 g in 0.9% sodium chloride (MBP/ADV) 50 mL  1 g IntraVENous Q24H Katelyn Pace  mL/hr at 04/20/20 2104 1 g at 04/20/20 2104    HYDROcodone-acetaminophen (NORCO) 5-325 mg per tablet 1 Tab  1 Tab Oral Q4H PRN Katelyn Pace MD   1 Tab at 04/21/20 0217    docusate sodium (COLACE) capsule 100 mg  100 mg Oral BID Katelyn Pace MD   100 mg at 04/20/20 1750    heparin (porcine) injection 5,000 Units  5,000 Units SubCUTAneous Q8H Katelyn Pace MD   5,000 Units at 04/20/20 0403    prochlorperazine (COMPAZINE) with saline injection 5 mg  5 mg IntraVENous Q8H PRN Katelyn Pace MD            No Known Allergies       Objective:     Patient Vitals for the past 8 hrs:   BP Temp Pulse Resp SpO2   04/21/20 0744 109/61 98 °F (36.7 °C) (!) 106 16 94 %   04/21/20 0300 107/61 98.1 °F (36.7 °C) (!) 110 16 99 %       Lab Results   Component Value Date/Time    WBC 7.5 04/21/2020 02:36 AM    HGB 13.3 04/21/2020 02:36 AM    HCT 39.7 04/21/2020 02:36 AM    PLATELET 893 63/12/1888 02:36 AM    .8 (H) 04/21/2020 02:36 AM       Physical Exam:   General appearance: alert, cooperative, no distress, appears older than stated age  Head: Normocephalic, without obvious abnormality, atraumatic  Lungs: diminished breath sounds R base  Heart: regular rate and rhythm  Abdomen: soft, non-tender. Bowel sounds normal. No masses,  no organomegaly  Extremities: no edema  Skin: Skin color, texture, turgor normal. No rashes or lesions  Lymph nodes: Cervical, supraclavicular, and axillary nodes normal.  Neurologic: Flaccid lower extremities,     CT results    CT chest/abd/pelvis (4/17/2020)  IMPRESSION:  Mediastinal, right hilar, and retroperitoneal lymphadenopathy. Right lower lobe mass lesion as well as postobstructive consolidation. Interstitial infiltrate is noted throughout the right upper lobe. Pleural-based mass lesions throughout the right upper lobe. Hepatic metastases. Left adrenal masses concerning for  metastatic disease. Marked distention of the bladder with soft tissue nodule involving the left bladder wall may represent an omental metastasis    MRI Results (most recent):  Results from Hospital Encounter encounter on 04/17/20   MRI [facility] SPINE WO CONT    Narrative INDICATION:  metastatic cord compression     COMPARISON: CT April 17, 2020    TECHNIQUE: Multiplanar multisequence acquisition of the thoracic spine. CONTRAST:  None. FINDINGS:    Significant motion artifact. Normal alignment of the thoracic spine. Multiple  areas of abnormal marrow signal involving the T1, T5, T7, T8, T9 vertebral  bodies, as well as left T2 transverse process/second rib, probable T3 and T4  spinous processes and likely bilateral ribs. No evidence of acute or subacute  compression fracture however. Heterogeneous appearance of the thoracic spinal  cord, beginning at the level of approximately T3 and extending to the  thoracolumbar junction, with probable cord expansion and edema. IV contrast was  not administered. Enlarged thoracic lymph nodes, right hilar/lower lobe mass,  numerous hepatic metastases in left adrenal mass better seen on recent CT  examination. Impression IMPRESSION:    1. Severe motion artifact. 2. Heterogeneous appearance of the thoracic spinal cord from T3 to the  thoracolumbar junction as above, concerning for metastatic involvement which may  be leptomeningeal or intramedullary, particularly given the abnormal appearance  on lumbar spine MRI. 3. Osseous metastatic disease without compression fracture. Epidural tumor  extension cannot be completely excluded. 4. See recent CT chest abdomen pelvis findings regarding lung mass, adenopathy,  hepatic and adrenal metastases. Ordering physician notified via [employer] system upon interpretation. 23X               Assessment:     1. Metastatic disease to lungs, liver, bone adrenals and retroperitoneal LNs    Unknown primary, likely lung    Poor Prognosis  Wide spread metastatic disease found on this admission. Multiple spinal metastases. New onset of bilateral lower extremity paraplegia. Neurologic exam sensory level T7-T8. Currently on IV dexamethasone. CT head without contrast - negative. 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 Dr. Manuel Oakley requested for patient to get MRI of the brain however patient has refused. He also refused a LP.     Would need a liver biopsy for confirmation of primary, however given the wide spread disease and his unfortunate living situation would recommend transitioning to hospice. 2. Flaccid lower extremities    New onset this morning -   Neurology following -  - patient has declined all interventions except CT of the head  Neurosurgery following - no surgical intervention at this time  Radiation Oncology consulted - patient is not interested in radiation      3. Pancreatitis -     Lipase 2184 today  Managed by GI. S. Raina Halsted, [place] and Dr. Yennifer Lemon:     1. Palliative medicine consult for care decisions  2. Consider palliative radiation - patient is not interested at this time. Dr. Raine Bailey  discussed realistic outcomes which may not be beneficial  3. For definitive diagnosis would need biopsy of the liver  4. This is a very complex situation because the patient is homeless and would need somewhere to live to receive palliative treatment. He also is unable to care for himself due to his limited mobility from paraplegia. 5. Discussed transitioning to hospice. He will consider his options and then make a decision.        Akilah Martinez, BERHANE

## 2020-04-21 NOTE — PROGRESS NOTES
F/U for acute pancreatitis  Complex patient    S: Mr. Sumeet Genao was seen by me today during rounds. At this time, he is resting + un comfortably. He cannot move legs. He cannot feel anything below his mid chest.  No abdominal pain. Tolerating clear liquids. Wants to eat. The patient has no other new complaints today. Please see admission consult for details of ROS; there are no changes today. O: Blood pressure 107/61, pulse (!) 110, temperature 98.1 °F (36.7 °C), resp. rate 16, weight 62.1 kg (137 lb), SpO2 99 %. Gen: Patient is in no acute distress. There is no jaundice. Lungs: Clear to auscultation bilaterally . Heart:+RRR. Abd: Soft, non tender, +distended, bowel sounds present. Extremities: Warm. Lab Results   Component Value Date/Time    Lipase 2,184 (H) 04/21/2020 02:36 AM     Lab Results   Component Value Date/Time    WBC 7.5 04/21/2020 02:36 AM    HGB 13.3 04/21/2020 02:36 AM    HCT 39.7 04/21/2020 02:36 AM    PLATELET 435 61/60/2680 02:36 AM    .8 (H) 04/21/2020 02:36 AM     Lab Results   Component Value Date/Time    Sodium 137 04/21/2020 02:36 AM    Potassium 5.4 (H) 04/21/2020 02:36 AM    Chloride 103 04/21/2020 02:36 AM    CO2 24 04/21/2020 02:36 AM    Anion gap 10 04/21/2020 02:36 AM    Glucose 123 (H) 04/21/2020 02:36 AM    BUN 85 (H) 04/21/2020 02:36 AM    Creatinine 2.76 (H) 04/21/2020 02:36 AM    BUN/Creatinine ratio 31 (H) 04/21/2020 02:36 AM    GFR est AA 29 (L) 04/21/2020 02:36 AM    GFR est non-AA 24 (L) 04/21/2020 02:36 AM    Calcium 7.9 (L) 04/21/2020 02:36 AM    Bilirubin, total 1.9 (H) 04/20/2020 04:22 AM    AST (SGOT) 489 (H) 04/20/2020 04:22 AM    Alk.  phosphatase 728 (H) 04/20/2020 04:22 AM    Protein, total 6.6 04/20/2020 04:22 AM    Albumin 2.6 (L) 04/20/2020 04:22 AM    Globulin 4.0 04/20/2020 04:22 AM    A-G Ratio 0.7 (L) 04/20/2020 04:22 AM    ALT (SGPT) 270 (H) 04/20/2020 04:22 AM      Cross sectional imaging:  See mri cervical, lumbar and thoracic spine reports    A: Active Problems:    Acute respiratory failure with hypoxia ([address] (4/17/2020)      Paraplegia, complete ([address] (4/20/2020)      Spinal cord compression due to malignant neoplasm metastatic to spine [facility]) (4/20/2020)        Comment:  Pancreatitis is improving; cause not clear  Liver tests likely due to metastatic disease.     P:  Supportive care  Await diagnosis and plan per primary team.  Palliative care to see    Advance to gi lite diet    Zainab Oakley MD8:14 AM

## 2020-04-21 NOTE — PROGRESS NOTES
NAME: Gloria Carr        :  1960        MRN:  [id number]        Assessment :    Plan:  --GUANAKO on CKD stage 4  Markedly distended bladder  Hyperkalemia  Mild Metabolic acidosis     New lung cancer w/ suspected mets to liver, pleura, left adrenal and mediastinal/hilar and retroperitoneal LN's     Pancreatitis-improving. Taking PO    Hypotension-mild Creatinine better (peaked at 3.8, now 3.2 to 2.84 to 2.76); marked urine retention (keep rodriguez); good uop; looks like obstructive uropathy    But persistent high K; mild  Switch HCO3 drip to IV NS and increase rate to 100 ml/hr  Ct K restriction  Lasix 40 mg PO x 1 for Kaliuresis    Started on flomax    Sars-cov-2 neg    D/W pt        Subjective:     Chief Complaint:  More alert awake. Taking po  Unable to move legs    Review of Systems: as above    Could not obtain due to:      Objective:     VITALS:   Last 24hrs VS reviewed since prior progress note.  Most recent are:  Visit Vitals  /66   Pulse (!) 109   Temp 97.8 °F (36.6 °C)   Resp 16   Wt 62.1 kg (137 lb)   SpO2 93%   BMI 20.23 kg/m²       Intake/Output Summary (Last 24 hours) at 2020 1341  Last data filed at 2020 2979  Gross per 24 hour   Intake    Output 1025 ml   Net -1025 ml      Telemetry Reviewed:     PHYSICAL EXAM:  General: cachectic, chronic ill appearing in NAD  Bitemporal wasting  No icterus, mmm  No resp distress  Mild tachy   No edema  AOx3    Lab Data Reviewed: (see below)    Medications Reviewed: (see below)    PMH/SH reviewed - no change compared to H&P  ________________________________________________________________________  Care Plan discussed with:  Patient y    [facility]:          Comments   >50% of visit spent in counseling and coordination of care       ________________________________________________________________________  [facility] MD     Procedures: see electronic medical records for all procedures/Xrays and details which  were not copied into this note but were reviewed prior to creation of Plan. LABS:  Recent Labs     04/21/20 0236 04/20/20 0422   WBC 7.5 9.8   HGB 13.3 14.1   HCT 39.7 42.1    178     Recent Labs     04/21/20 0236 04/20/20 0422 04/19/20 0329    134* 135*   K 5.4* 5.3* 5.3*    103 107   CO2 24 21 18*   BUN 85* 81* 85*   CREA 2.76* 2.84* 3.19*   * 93 94   CA 7.9* 8.4* 8.6   MG  --  2.6* 2.9*   PHOS  --  3.5 3.3     Recent Labs     04/21/20 0236 04/20/20 0422 04/19/20 0329   SGOT  --  489* 404*   AP  --  728* 684*   TP  --  6.6 5.6*  6.4   ALB  --  2.6* 2.6*   GLOB  --  4.0 3.8   LPSE 2,184* >3,000*  --      No results for input(s): INR, PTP, APTT, INREXT, INREXT in the last 72 hours. Recent Labs     04/19/20 0329   TIBC 183*   PSAT 34   FERR 1,043*      No results found for: FOL, RBCF   No results for input(s): PH, PCO2, PO2 in the last 72 hours. No results for input(s): CPK, CKMB in the last 72 hours.     No lab exists for component: TROPONINI  MEDICATIONS:  Current Facility-Administered Medications   Medication Dose Route Frequency    [START ON 4/22/2020] pantoprazole (PROTONIX) tablet 40 mg  40 mg Oral ACB    0.9% sodium chloride infusion  100 mL/hr IntraVENous CONTINUOUS    furosemide (LASIX) tablet 40 mg  40 mg Oral ONCE    dexamethasone (DECADRON) 20 mg in 0.9% sodium chloride 50 mL IVPB  20 mg IntraVENous Q6H    tamsulosin (FLOMAX) capsule 0.4 mg  0.4 mg Oral DAILY    thiamine mononitrate (B-1) tablet 100 mg  100 mg Oral DAILY    therapeutic multivitamin (THERAGRAN) tablet 1 Tab  1 Tab Oral DAILY    lactobac ac& pc-s.therm-b.anim (PATRIZIA Q/RISAQUAD)  1 Cap Oral DAILY    morphine injection 1 mg  1 mg IntraVENous Q2H PRN    metoprolol tartrate (LOPRESSOR) tablet 50 mg  50 mg Oral BID    doxycycline (VIBRA-TABS) tablet 100 mg  100 mg Oral Q12H    metoprolol (LOPRESSOR) injection 2.5 mg  2.5 mg IntraVENous Q6H PRN    sodium chloride (NS) flush 5-40 mL  5-40 mL IntraVENous Q8H    sodium chloride (NS) flush 5-40 mL  5-40 mL IntraVENous PRN    cefTRIAXone (ROCEPHIN) 1 g in 0.9% sodium chloride (MBP/ADV) 50 mL  1 g IntraVENous Q24H    HYDROcodone-acetaminophen (NORCO) 5-325 mg per tablet 1 Tab  1 Tab Oral Q4H PRN    docusate sodium (COLACE) capsule 100 mg  100 mg Oral BID    heparin (porcine) injection 5,000 Units  5,000 Units SubCUTAneous Q8H    prochlorperazine (COMPAZINE) with saline injection 5 mg  5 mg IntraVENous Q8H PRN

## 2020-04-22 PROBLEM — G89.3 PAIN DUE TO NEOPLASM: Status: ACTIVE | Noted: 2020-04-22

## 2020-04-22 PROBLEM — C79.51 METASTATIC CARCINOMA INVOLVING BONE WITH UNKNOWN PRIMARY SITE (HCC): Status: ACTIVE | Noted: 2020-04-22

## 2020-04-22 PROBLEM — C80.1 METASTATIC CARCINOMA INVOLVING BONE WITH UNKNOWN PRIMARY SITE (HCC): Status: ACTIVE | Noted: 2020-04-22

## 2020-04-22 PROBLEM — R53.0 NEOPLASTIC MALIGNANT RELATED FATIGUE: Status: ACTIVE | Noted: 2020-04-22

## 2020-04-22 LAB
ALBUMIN SERPL-MCNC: 2.3 G/DL (ref 3.5–5)
ALBUMIN/GLOB SERPL: 0.6 {RATIO} (ref 1.1–2.2)
ALP SERPL-CCNC: 686 U/L (ref 45–117)
ALT SERPL-CCNC: 295 U/L (ref 12–78)
ANION GAP SERPL CALC-SCNC: 10 MMOL/L (ref 5–15)
AST SERPL-CCNC: 535 U/L (ref 15–37)
BACTERIA SPEC CULT: NORMAL
BACTERIA SPEC CULT: NORMAL
BILIRUB DIRECT SERPL-MCNC: 0.5 MG/DL (ref 0–0.2)
BILIRUB SERPL-MCNC: 0.9 MG/DL (ref 0.2–1)
BUN SERPL-MCNC: 96 MG/DL (ref 6–20)
BUN/CREAT SERPL: 37 (ref 12–20)
CALCIUM SERPL-MCNC: 7.4 MG/DL (ref 8.5–10.1)
CHLORIDE SERPL-SCNC: 104 MMOL/L (ref 97–108)
CO2 SERPL-SCNC: 25 MMOL/L (ref 21–32)
CREAT SERPL-MCNC: 2.59 MG/DL (ref 0.7–1.3)
ERYTHROCYTE [DISTWIDTH] IN BLOOD BY AUTOMATED COUNT: 15 % (ref 11.5–14.5)
GLOBULIN SER CALC-MCNC: 3.7 G/DL (ref 2–4)
GLUCOSE BLD STRIP.AUTO-MCNC: 160 MG/DL (ref 65–100)
GLUCOSE SERPL-MCNC: 133 MG/DL (ref 65–100)
HCT VFR BLD AUTO: 36.8 % (ref 36.6–50.3)
HGB BLD-MCNC: 12.6 G/DL (ref 12.1–17)
LIPASE SERPL-CCNC: 1711 U/L (ref 73–393)
MAGNESIUM SERPL-MCNC: 2.6 MG/DL (ref 1.6–2.4)
MCH RBC QN AUTO: 34.7 PG (ref 26–34)
MCHC RBC AUTO-ENTMCNC: 34.2 G/DL (ref 30–36.5)
MCV RBC AUTO: 101.4 FL (ref 80–99)
NRBC # BLD: 0 K/UL (ref 0–0.01)
NRBC BLD-RTO: 0 PER 100 WBC
PLATELET # BLD AUTO: 170 K/UL (ref 150–400)
PMV BLD AUTO: 10.4 FL (ref 8.9–12.9)
POTASSIUM SERPL-SCNC: 4.9 MMOL/L (ref 3.5–5.1)
POTASSIUM SERPL-SCNC: 5.1 MMOL/L (ref 3.5–5.1)
PROT SERPL-MCNC: 6 G/DL (ref 6.4–8.2)
RBC # BLD AUTO: 3.63 M/UL (ref 4.1–5.7)
SERVICE CMNT-IMP: ABNORMAL
SERVICE CMNT-IMP: NORMAL
SERVICE CMNT-IMP: NORMAL
SODIUM SERPL-SCNC: 139 MMOL/L (ref 136–145)
WBC # BLD AUTO: 13.7 K/UL (ref 4.1–11.1)

## 2020-04-22 PROCEDURE — 74011250636 HC RX REV CODE- 250/636: Performed by: PSYCHIATRY & NEUROLOGY

## 2020-04-22 PROCEDURE — 65660000000 HC RM CCU STEPDOWN

## 2020-04-22 PROCEDURE — 74011250637 HC RX REV CODE- 250/637: Performed by: INTERNAL MEDICINE

## 2020-04-22 PROCEDURE — 80048 BASIC METABOLIC PNL TOTAL CA: CPT

## 2020-04-22 PROCEDURE — 74011000258 HC RX REV CODE- 258: Performed by: PSYCHIATRY & NEUROLOGY

## 2020-04-22 PROCEDURE — 74011000258 HC RX REV CODE- 258: Performed by: GENERAL ACUTE CARE HOSPITAL

## 2020-04-22 PROCEDURE — 94760 N-INVAS EAR/PLS OXIMETRY 1: CPT

## 2020-04-22 PROCEDURE — 83690 ASSAY OF LIPASE: CPT

## 2020-04-22 PROCEDURE — 85027 COMPLETE CBC AUTOMATED: CPT

## 2020-04-22 PROCEDURE — 84132 ASSAY OF SERUM POTASSIUM: CPT

## 2020-04-22 PROCEDURE — 82962 GLUCOSE BLOOD TEST: CPT

## 2020-04-22 PROCEDURE — 74011250636 HC RX REV CODE- 250/636: Performed by: INTERNAL MEDICINE

## 2020-04-22 PROCEDURE — 74011250637 HC RX REV CODE- 250/637: Performed by: GENERAL ACUTE CARE HOSPITAL

## 2020-04-22 PROCEDURE — 36415 COLL VENOUS BLD VENIPUNCTURE: CPT

## 2020-04-22 PROCEDURE — 80076 HEPATIC FUNCTION PANEL: CPT

## 2020-04-22 PROCEDURE — 93005 ELECTROCARDIOGRAM TRACING: CPT

## 2020-04-22 PROCEDURE — 83735 ASSAY OF MAGNESIUM: CPT

## 2020-04-22 PROCEDURE — 74011250636 HC RX REV CODE- 250/636: Performed by: GENERAL ACUTE CARE HOSPITAL

## 2020-04-22 PROCEDURE — 74011250637 HC RX REV CODE- 250/637: Performed by: NURSE PRACTITIONER

## 2020-04-22 RX ORDER — GABAPENTIN 300 MG/1
300 CAPSULE ORAL 3 TIMES DAILY
Status: DISCONTINUED | OUTPATIENT
Start: 2020-04-22 | End: 2020-04-22

## 2020-04-22 RX ORDER — NALOXONE HYDROCHLORIDE 0.4 MG/ML
0.4 INJECTION, SOLUTION INTRAMUSCULAR; INTRAVENOUS; SUBCUTANEOUS
Status: DISCONTINUED | OUTPATIENT
Start: 2020-04-22 | End: 2020-04-24 | Stop reason: HOSPADM

## 2020-04-22 RX ORDER — DEXAMETHASONE SODIUM PHOSPHATE 4 MG/ML
4 INJECTION, SOLUTION INTRA-ARTICULAR; INTRALESIONAL; INTRAMUSCULAR; INTRAVENOUS; SOFT TISSUE EVERY 6 HOURS
Status: DISCONTINUED | OUTPATIENT
Start: 2020-04-22 | End: 2020-04-24 | Stop reason: HOSPADM

## 2020-04-22 RX ORDER — MORPHINE SULFATE 2 MG/ML
1.5 INJECTION, SOLUTION INTRAMUSCULAR; INTRAVENOUS
Status: DISCONTINUED | OUTPATIENT
Start: 2020-04-22 | End: 2020-04-24 | Stop reason: HOSPADM

## 2020-04-22 RX ORDER — GABAPENTIN 300 MG/1
300 CAPSULE ORAL 3 TIMES DAILY
Status: DISCONTINUED | OUTPATIENT
Start: 2020-04-22 | End: 2020-04-23

## 2020-04-22 RX ADMIN — DOXYCYCLINE HYCLATE 100 MG: 100 TABLET, COATED ORAL at 21:25

## 2020-04-22 RX ADMIN — DOCUSATE SODIUM 100 MG: 100 CAPSULE, LIQUID FILLED ORAL at 08:12

## 2020-04-22 RX ADMIN — PANTOPRAZOLE SODIUM 40 MG: 40 TABLET, DELAYED RELEASE ORAL at 08:12

## 2020-04-22 RX ADMIN — TAMSULOSIN HYDROCHLORIDE 0.4 MG: 0.4 CAPSULE ORAL at 08:12

## 2020-04-22 RX ADMIN — DOCUSATE SODIUM 100 MG: 100 CAPSULE, LIQUID FILLED ORAL at 18:29

## 2020-04-22 RX ADMIN — DEXAMETHASONE SODIUM PHOSPHATE 4 MG: 4 INJECTION, SOLUTION INTRAMUSCULAR; INTRAVENOUS at 18:29

## 2020-04-22 RX ADMIN — Medication 10 ML: at 13:36

## 2020-04-22 RX ADMIN — GABAPENTIN 300 MG: 300 CAPSULE ORAL at 21:25

## 2020-04-22 RX ADMIN — DEXAMETHASONE SODIUM PHOSPHATE 20 MG: 4 INJECTION, SOLUTION INTRA-ARTICULAR; INTRALESIONAL; INTRAMUSCULAR; INTRAVENOUS; SOFT TISSUE at 08:14

## 2020-04-22 RX ADMIN — Medication 1 CAPSULE: at 08:12

## 2020-04-22 RX ADMIN — SODIUM CHLORIDE 75 ML/HR: 900 INJECTION, SOLUTION INTRAVENOUS at 19:43

## 2020-04-22 RX ADMIN — DEXAMETHASONE SODIUM PHOSPHATE 4 MG: 4 INJECTION, SOLUTION INTRAMUSCULAR; INTRAVENOUS at 12:00

## 2020-04-22 RX ADMIN — THIAMINE HCL TAB 100 MG 100 MG: 100 TAB at 08:12

## 2020-04-22 RX ADMIN — GABAPENTIN 300 MG: 300 CAPSULE ORAL at 14:43

## 2020-04-22 RX ADMIN — SODIUM CHLORIDE 100 ML/HR: 900 INJECTION, SOLUTION INTRAVENOUS at 07:34

## 2020-04-22 RX ADMIN — CEFTRIAXONE 1 G: 1 INJECTION, POWDER, FOR SOLUTION INTRAMUSCULAR; INTRAVENOUS at 20:43

## 2020-04-22 RX ADMIN — DEXAMETHASONE SODIUM PHOSPHATE 20 MG: 4 INJECTION, SOLUTION INTRA-ARTICULAR; INTRALESIONAL; INTRAMUSCULAR; INTRAVENOUS; SOFT TISSUE at 02:28

## 2020-04-22 RX ADMIN — MORPHINE SULFATE 1 MG: 2 INJECTION, SOLUTION INTRAMUSCULAR; INTRAVENOUS at 14:43

## 2020-04-22 RX ADMIN — THERA TABS 1 TABLET: TAB at 08:12

## 2020-04-22 RX ADMIN — HEPARIN SODIUM 5000 UNITS: 5000 INJECTION INTRAVENOUS; SUBCUTANEOUS at 04:37

## 2020-04-22 RX ADMIN — HEPARIN SODIUM 5000 UNITS: 5000 INJECTION INTRAVENOUS; SUBCUTANEOUS at 13:36

## 2020-04-22 RX ADMIN — DOXYCYCLINE HYCLATE 100 MG: 100 TABLET, COATED ORAL at 08:12

## 2020-04-22 RX ADMIN — HYDROCODONE BITARTRATE AND ACETAMINOPHEN 1 TABLET: 5; 325 TABLET ORAL at 19:43

## 2020-04-22 RX ADMIN — HYDROCODONE BITARTRATE AND ACETAMINOPHEN 1 TABLET: 5; 325 TABLET ORAL at 04:35

## 2020-04-22 RX ADMIN — HEPARIN SODIUM 5000 UNITS: 5000 INJECTION INTRAVENOUS; SUBCUTANEOUS at 20:47

## 2020-04-22 RX ADMIN — Medication 10 ML: at 21:25

## 2020-04-22 NOTE — CONSULTS
Palliative Medicine Consult    Patient Name: Ananya Styles  YOB: 1960    Date of Initial Consult: 4/21/2020  Reason for Consult: Care Decisions  Requesting Provider: Samantha Ruiz NP   Primary Care Physician: None      SUMMARY:   Ananya Styles is a 64 y.o. with newly-diagnosed widely metastatic disease, new onset paraplegia, CKD, systolic CHF who was admitted on 4/17/2020 with a diagnosis of AHFR, acute pancreatitis, elevated LFTs, elevated troponin, GUANAKO on CKD. On further evaluation he was found to have newly-diagnosed widely metastatic disease to the lungs, liver, adrenals, retroperitoneal LNs, and diffuse bony and intrathecal mets for which acute neurosurgical intervention was not indicated. He has also been evaluated by Neurology for new-onset paraplegic around T7-T8, for which he is currently receiving decadron for possible cord compression from metastatic disease. He has also been evaluated by Radiation Oncology, who indicated that radiation intervention is unlikely to provide neurologic benefit. He has been evaluated by GI for pancreatitis. Given the extent of widespread disease and overall poor prognosis, Oncology has recommended transitioning to hospice. Current medical issues leading to Palliative Medicine involvement include: Care decisions. PALLIATIVE DIAGNOSES:   1. Newly-diagnosed widely metastatic disease, likely lung primary per Oncology   2. New-onset paraplegia around T7-T8  3. CKD  4. Systolic CHF  5. Pancreatitis  6. Pain related to metastasis  7. Urinary retention  8. Fatigue/lethargy  9. Decreased appetite     PLAN:     1. I met with the patient. We discussed the role of Palliative Care. 2. Goals of care: patient says he cannot remember everything he has been told during this hospitalization but that he knows he is not doing well. We did discuss that his cancer is not curable. \"Just keep me alive,\" he says at one point.  I introduced the philosopy of hospice as specialized care for those with serious illness who wish to focus on comfort and quality of life at the end of life (rather than pursuing tests, procedures, hospitalizations, etc.). After I introduced hospice he asked, Noah Acosta we talk about his later? \" We were not able to discuss code status today as patient appears to still be processing his cancer diagnosis and did not seem ready to discuss everything in depth. It may take some time for him to process and make decisions. Will continue to follow up for goc discussions. 3. ACP: there is no ACP on file. Patient states he has one son Candice Garcia and two grandchildren. He indicates that it would be okay to talk to his son about care decisions but that he would have to look for his son's phone number. Also he does not know where his son is at any given moment since he is a . 4. Symptom management:   A. Pain related to metastasis: 1 dose prn norco since midnight, 2 doses prn IV morphine since midnight, receiving dexamethasone (also receiving ppi)   B. Constipation: receiving colace bid   C. Urinary retention: rodriguez in place, receiving flomax   D. Fatigue/lethargy: pt declined in depth discussion and exam today   E. Decreased appetite: ate a few bites of sandwich from lunch tray today   F. Weakness/numbess related to paraplegia  5. Psychosocial: patient is reportedly homeless and told bedside nurse he was previously living under a bridge. 6. Patient's care discussed with bedside nurse, Palliative SW, hospice liason for coordination of care.      GOALS OF CARE / TREATMENT PREFERENCES:   [====Goals of Care====]  GOALS OF CARE:         TREATMENT PREFERENCES:   Code Status: Full Code    Advance Care Planning:  Advance Care Planning 4/17/2020   Patient's Healthcare Decision Maker is: Verbal statement (Legal Next of Kin remains as decision maker)   Confirm Advance Directive None   Patient Would Like to Complete Advance Directive No                 The palliative care team has discussed with patient / health care proxy about goals of care / treatment preferences for patient.  [====Goals of Care====]         HISTORY:     History obtained from: chart review, palliative IDT, bedside nurse, patient    CHIEF COMPLAINT: tired    HPI/SUBJECTIVE:    The patient is:   [x] Verbal and participatory  [] Non-participatory due to:     Patient says he cannot remember everything he has been told during this hospitalization but that he knows he is not doing well. \"Just keep me alive,\" he says at one point. We did discuss that his cancer is not curable. I introduced the concept of hospice as specialized care for those with serious illness who wish to focus on comfort and quality of life at the end of life (rather than pursuing tests, procedures, hospitalizations, etc.). After I introduced hospice he asked, April Roca we talk about his later? \" He asked to not continue further discussion, and so I was unable to complete full ROS due to this. Patient states he has one son Tereza Santana and two grandchildren. He indicates that it would be okay to talk to his son about care decisions but that he would have to look for his son's number and does not know where his son is at any given moment since he is a . Clinical Pain Assessment (nonverbal scale for severity on nonverbal patients): Adult Nonverbal Pain Scale  Face: No particular expression or smile  Activity (Movement): Lying quietly, normal position  Guarding: Lying quietly, no positioning of hands over areas of body  Physiology (Vital Signs):  Stable vital signs  Respiratory: Baseline RR/SpO2 compliant with ventilator  Total Score: 0       FUNCTIONAL ASSESSMENT:     Palliative Performance Scale (PPS):  PPS: 30       PSYCHOSOCIAL/SPIRITUAL SCREENING:     Advance Care Planning:  Advance Care Planning 4/17/2020   Patient's Healthcare Decision Maker is: Verbal statement (Legal Next of Kin remains as decision maker)   Confirm Advance Directive None Patient Would Like to Complete Advance Directive No        Any spiritual / Pentec[employer]al concerns:  [] Yes /  [] No    Caregiver Burnout:  [] Yes /  [] No /  [x] No Caregiver Present      Anticipatory grief assessment:   [] Normal  / [] Maladaptive       ESAS Anxiety:      ESAS Depression:          REVIEW OF SYSTEMS:     Positive and pertinent negative findings in ROS are noted above in HPI. The following systems were [] reviewed / [x] unable to be reviewed as noted in HPI  Other findings are noted below. Systems: constitutional, ears/nose/mouth/throat, respiratory, gastrointestinal, genitourinary, musculoskeletal, integumentary, neurologic, psychiatric, endocrine. Positive findings noted below. Modified ESAS Completed by: provider           Pain: (patient did not give)           Dyspnea: 0                    PHYSICAL EXAM:     From RN flowsheet:  Wt Readings from Last 3 Encounters:   04/19/20 137 lb (62.1 kg)   04/20/20 136 lb 14.5 oz (62.1 kg)   04/17/20 150 lb (68 kg)     Blood pressure 94/60, pulse 84, temperature 98.3 °F (36.8 °C), resp. rate 18, weight 137 lb (62.1 kg), SpO2 91 %.     Pain Scale 1: Numeric (0 - 10)  Pain Intensity 1: 9  Pain Onset 1: weeks  Pain Location 1: Back  Pain Orientation 1: Upper  Pain Description 1: Aching  Pain Intervention(s) 1: Medication (see MAR), Relaxation technique, Repositioned, Rest  Last bowel movement, if known:     Constitutional: lying abed with eyes closed resting, wakes to voice, appears fatigued  Eyes: pupils equal, lids normal, no drainage  ENMT: no nasal discharge, moist mucous membranes  Cardiovascular: deferred d/t pt request  Respiratory: breathing not labored, normal rate, no audible wheezing  Gastrointestinal: deferred d/t pt request  Musculoskeletal: +generalized weakness  Skin: appears dry without apparent rash of face or arms  Neurologic: alert, answers direct questions  Psychiatric: blunted affect, answers questions appropriately     HISTORY:     Active Problems:    Acute respiratory failure with hypoxia ([facility] [address] (4/17/2020)      Paraplegia, complete ([facility] [address] (4/20/2020)      Spinal cord compression due to malignant neoplasm metastatic to spine [facility]) (4/20/2020)      Past Medical History:   Diagnosis Date    Hypotension     Vertigo       History reviewed. No pertinent surgical history. Family History   Problem Relation Age of Onset    Heart Disease Mother     Other Father         Unknown    Other Child         Gunshot wounds      History reviewed, no pertinent family history. Social History     Tobacco Use    Smoking status: Former Smoker    Smokeless tobacco: Never Used   Substance Use Topics    Alcohol use: Yes     Comment: 2-3 cans of beer a day.       No Known Allergies   Current Facility-Administered Medications   Medication Dose Route Frequency    [START ON 4/22/2020] pantoprazole (PROTONIX) tablet 40 mg  40 mg Oral ACB    0.9% sodium chloride infusion  100 mL/hr IntraVENous CONTINUOUS    dexamethasone (DECADRON) 20 mg in 0.9% sodium chloride 50 mL IVPB  20 mg IntraVENous Q6H    tamsulosin (FLOMAX) capsule 0.4 mg  0.4 mg Oral DAILY    thiamine mononitrate (B-1) tablet 100 mg  100 mg Oral DAILY    therapeutic multivitamin (THERAGRAN) tablet 1 Tab  1 Tab Oral DAILY    lactobac ac& pc-s.therm-b.anim (PATRIZIA Q/RISAQUAD)  1 Cap Oral DAILY    morphine injection 1 mg  1 mg IntraVENous Q2H PRN    metoprolol tartrate (LOPRESSOR) tablet 50 mg  50 mg Oral BID    doxycycline (VIBRA-TABS) tablet 100 mg  100 mg Oral Q12H    metoprolol (LOPRESSOR) injection 2.5 mg  2.5 mg IntraVENous Q6H PRN    sodium chloride (NS) flush 5-40 mL  5-40 mL IntraVENous Q8H    sodium chloride (NS) flush 5-40 mL  5-40 mL IntraVENous PRN    cefTRIAXone (ROCEPHIN) 1 g in 0.9% sodium chloride (MBP/ADV) 50 mL  1 g IntraVENous Q24H    HYDROcodone-acetaminophen (NORCO) 5-325 mg per tablet 1 Tab  1 Tab Oral Q4H PRN    docusate sodium (COLACE) capsule 100 mg  100 mg Oral BID    heparin (porcine) injection 5,000 Units  5,000 Units SubCUTAneous Q8H    prochlorperazine (COMPAZINE) with saline injection 5 mg  5 mg IntraVENous Q8H PRN          LAB AND IMAGING FINDINGS:     Lab Results   Component Value Date/Time    WBC 7.5 04/21/2020 02:36 AM    HGB 13.3 04/21/2020 02:36 AM    PLATELET 935 77/60/5198 02:36 AM     Lab Results   Component Value Date/Time    Sodium 137 04/21/2020 02:36 AM    Potassium 5.4 (H) 04/21/2020 02:36 AM    Chloride 103 04/21/2020 02:36 AM    CO2 24 04/21/2020 02:36 AM    BUN 85 (H) 04/21/2020 02:36 AM    Creatinine 2.76 (H) 04/21/2020 02:36 AM    Calcium 7.9 (L) 04/21/2020 02:36 AM    Magnesium 2.6 (H) 04/20/2020 04:22 AM    Phosphorus 3.5 04/20/2020 04:22 AM      Lab Results   Component Value Date/Time    AST (SGOT) 489 (H) 04/20/2020 04:22 AM    Alk. phosphatase 728 (H) 04/20/2020 04:22 AM    Protein, total 6.6 04/20/2020 04:22 AM    Albumin 2.6 (L) 04/20/2020 04:22 AM    Globulin 4.0 04/20/2020 04:22 AM     No results found for: INR, PTMR, PTP, PT1, PT2, APTT, INREXT, INREXT   Lab Results   Component Value Date/Time    Iron 62 04/19/2020 03:29 AM    TIBC 183 (L) 04/19/2020 03:29 AM    Iron % saturation 34 04/19/2020 03:29 AM    Ferritin 1,043 (H) 04/19/2020 03:29 AM      No results found for: PH, PCO2, PO2  No components found for: [place]   Lab Results   Component Value Date/Time     (H) 04/17/2020 04:32 PM    CK - MB 4.2 (H) 04/17/2020 04:32 PM                Total time:   Counseling / coordination time, spent as noted above:   > 50% counseling / coordination?:     Prolonged service was provided for  []30 min   []75 min in face to face time in the presence of the patient, spent as noted above. Time Start:   Time End:   Note: this can only be billed with 47864 (initial) or 49534 (follow up). If multiple start / stop times, list each separately.

## 2020-04-22 NOTE — PROGRESS NOTES
NAME: Geovanny Hernandez        :  1960        MRN:  [id number]        Assessment :    Plan:  --GUANAKO on CKD stage 4  Markedly distended bladder  Hyperkalemia  Mild Metabolic acidosis     New lung cancer w/ suspected mets to liver, pleura, left adrenal and mediastinal/hilar and retroperitoneal LN's     Pancreatitis-improving. Taking PO    Hypotension-mild    Paraplegia with new mets to spine   Creatinine better (peaked at 3.8, now 3.2 to 2.84 to 2.76 to 2.59 ); marked urine retention (keep rodriguez); 2nd to obstructive uropathy    Serum K improved with a dose of lasix. Ct IV NS- reduce rate to 75 ml/hr    Ct K restriction in diet  Started on flomax    D/W pt        Subjective:     Chief Complaint: lethargic. Minimally conversant  Taking some po    Review of Systems: unable    Could not obtain due to:      Objective:     VITALS:   Last 24hrs VS reviewed since prior progress note. Most recent are:  Visit Vitals  /68 (BP 1 Location: Right arm, BP Patient Position: At rest)   Pulse 96   Temp 97.9 °F (36.6 °C)   Resp 16   Wt 62.1 kg (137 lb)   SpO2 90%   BMI 20.23 kg/m²       Intake/Output Summary (Last 24 hours) at 2020 1449  Last data filed at 2020 0430  Gross per 24 hour   Intake 60 ml   Output 1100 ml   Net -1040 ml      Telemetry Reviewed:     PHYSICAL EXAM:  General: cachectic, chronic ill appearing in NAD  Bitemporal wasting  No icterus, mmm  No resp distress  No edema  Resting.  lethargic    Lab Data Reviewed: (see below)    Medications Reviewed: (see below)    PMH/SH reviewed - no change compared to H&P  ________________________________________________________________________  Care Plan discussed with:  Patient y    [facility]:          Comments   >50% of visit spent in counseling and coordination of care ________________________________________________________________________  Marija Elam MD     Procedures: see electronic medical records for all procedures/Xrays and details which  were not copied into this note but were reviewed prior to creation of Plan.       LABS:  Recent Labs     04/22/20 0239 04/21/20 0236   WBC 13.7* 7.5   HGB 12.6 13.3   HCT 36.8 39.7    156     Recent Labs     04/22/20 0239 04/21/20 0236 04/20/20 0422    137 134*   K 4.9 5.4* 5.3*    103 103   CO2 25 24 21   BUN 96* 85* 81*   CREA 2.59* 2.76* 2.84*   * 123* 93   CA 7.4* 7.9* 8.4*   MG  --   --  2.6*   PHOS  --   --  3.5     Recent Labs     04/22/20 0239 04/21/20 0236 04/20/20 0422   SGOT 535*  --  489*   *  --  728*   TP 6.0*  --  6.6   ALB 2.3*  --  2.6*   GLOB 3.7  --  4.0   LPSE 1,711* 2,184* >3,000*     MEDICATIONS:  Current Facility-Administered Medications   Medication Dose Route Frequency    dexamethasone (DECADRON) 4 mg/mL injection 4 mg  4 mg IntraVENous Q6H    gabapentin (NEURONTIN) capsule 300 mg  300 mg Oral TID    pantoprazole (PROTONIX) tablet 40 mg  40 mg Oral ACB    0.9% sodium chloride infusion  75 mL/hr IntraVENous CONTINUOUS    tamsulosin (FLOMAX) capsule 0.4 mg  0.4 mg Oral DAILY    thiamine mononitrate (B-1) tablet 100 mg  100 mg Oral DAILY    therapeutic multivitamin (THERAGRAN) tablet 1 Tab  1 Tab Oral DAILY    lactobac ac& pc-s.therm-b.anim (PATRIZIA Q/RISAQUAD)  1 Cap Oral DAILY    morphine injection 1 mg  1 mg IntraVENous Q2H PRN    metoprolol tartrate (LOPRESSOR) tablet 50 mg  50 mg Oral BID    doxycycline (VIBRA-TABS) tablet 100 mg  100 mg Oral Q12H    metoprolol (LOPRESSOR) injection 2.5 mg  2.5 mg IntraVENous Q6H PRN    sodium chloride (NS) flush 5-40 mL  5-40 mL IntraVENous Q8H    sodium chloride (NS) flush 5-40 mL  5-40 mL IntraVENous PRN    cefTRIAXone (ROCEPHIN) 1 g in 0.9% sodium chloride (MBP/ADV) 50 mL  1 g IntraVENous Q24H    HYDROcodone-acetaminophen (NORCO) 5-325 mg per tablet 1 Tab  1 Tab Oral Q4H PRN    docusate sodium (COLACE) capsule 100 mg  100 mg Oral BID    heparin (porcine) injection 5,000 Units  5,000 Units SubCUTAneous Q8H    prochlorperazine (COMPAZINE) with saline injection 5 mg  5 mg IntraVENous Q8H PRN

## 2020-04-22 NOTE — PROGRESS NOTES
Gastroenterology Daily Progress Note (Dr. Jose Guadalupe Ahumada)   [address] [place] [address] Dr Douglas Date: 4/17/2020       Subjective:       Patient denies any pain in his abdomen. He is c/o not being able to feel his lower abdomen or legs, can't walk. Had a BM yesterday. He is still interested in maximal therapeutic options after meeting with palliative care. I discussed his Liver masses and underlying diffuse cancer openly.       Current Facility-Administered Medications   Medication Dose Route Frequency    pantoprazole (PROTONIX) tablet 40 mg  40 mg Oral ACB    0.9% sodium chloride infusion  100 mL/hr IntraVENous CONTINUOUS    dexamethasone (DECADRON) 20 mg in 0.9% sodium chloride 50 mL IVPB  20 mg IntraVENous Q6H    tamsulosin (FLOMAX) capsule 0.4 mg  0.4 mg Oral DAILY    thiamine mononitrate (B-1) tablet 100 mg  100 mg Oral DAILY    therapeutic multivitamin (THERAGRAN) tablet 1 Tab  1 Tab Oral DAILY    lactobac ac& pc-s.therm-b.anim (PATRIZIA Q/RISAQUAD)  1 Cap Oral DAILY    morphine injection 1 mg  1 mg IntraVENous Q2H PRN    metoprolol tartrate (LOPRESSOR) tablet 50 mg  50 mg Oral BID    doxycycline (VIBRA-TABS) tablet 100 mg  100 mg Oral Q12H    metoprolol (LOPRESSOR) injection 2.5 mg  2.5 mg IntraVENous Q6H PRN    sodium chloride (NS) flush 5-40 mL  5-40 mL IntraVENous Q8H    sodium chloride (NS) flush 5-40 mL  5-40 mL IntraVENous PRN    cefTRIAXone (ROCEPHIN) 1 g in 0.9% sodium chloride (MBP/ADV) 50 mL  1 g IntraVENous Q24H    HYDROcodone-acetaminophen (NORCO) 5-325 mg per tablet 1 Tab  1 Tab Oral Q4H PRN    docusate sodium (COLACE) capsule 100 mg  100 mg Oral BID    heparin (porcine) injection 5,000 Units  5,000 Units SubCUTAneous Q8H    prochlorperazine (COMPAZINE) with saline injection 5 mg  5 mg IntraVENous Q8H PRN        Objective:     Visit Vitals  /58   Pulse (!) 106   Temp 98.5 °F (36.9 °C)   Resp 18   Wt 62.1 kg (137 lb)   SpO2 91%   BMI 20.23 kg/m²   Blood pressure 112/58, pulse (!) 106, temperature 98.5 °F (36.9 °C), resp. rate 18, weight 62.1 kg (137 lb), SpO2 91 %. No intake/output data recorded. 04/20 1901 - 04/22 0700  In: 60 [P.O.:60]  Out: 2125 [Urine:2125]      Intake/Output Summary (Last 24 hours) at 4/22/2020 0758  Last data filed at 4/22/2020 0430  Gross per 24 hour   Intake 60 ml   Output 1100 ml   Net -1040 ml     Physical Exam:     General: Thin BM in NAD  Chest:  CTA, No rhonchi, rales or rubs.   Heart: S1, S2, RRR  GI: distended, mild tenderness in RUQ, no rebound or guarding, + BS    Labs:     Recent Results (from the past 24 hour(s))   CBC W/O DIFF    Collection Time: 04/22/20  2:39 AM   Result Value Ref Range    WBC 13.7 (H) 4.1 - 11.1 K/uL    RBC 3.63 (L) 4.10 - 5.70 M/uL    HGB 12.6 12.1 - 17.0 g/dL    HCT 36.8 36.6 - 50.3 %    .4 (H) 80.0 - 99.0 FL    MCH 34.7 (H) 26.0 - 34.0 PG    MCHC 34.2 30.0 - 36.5 g/dL    RDW 15.0 (H) 11.5 - 14.5 %    PLATELET 131 063 - 003 K/uL    MPV 10.4 8.9 - 12.9 FL    NRBC 0.0 0  WBC    ABSOLUTE NRBC 0.00 0.00 - 0.65 K/uL   METABOLIC PANEL, BASIC    Collection Time: 04/22/20  2:39 AM   Result Value Ref Range    Sodium 139 136 - 145 mmol/L    Potassium 4.9 3.5 - 5.1 mmol/L    Chloride 104 97 - 108 mmol/L    CO2 25 21 - 32 mmol/L    Anion gap 10 5 - 15 mmol/L    Glucose 133 (H) 65 - 100 mg/dL    BUN 96 (H) 6 - 20 MG/DL    Creatinine 2.59 (H) 0.70 - 1.30 MG/DL    BUN/Creatinine ratio 37 (H) 12 - 20      GFR est AA 31 (L) >60 ml/min/1.73m2    GFR est non-AA 26 (L) >60 ml/min/1.73m2    Calcium 7.4 (L) 8.5 - 10.1 MG/DL   LABRCNT(wbc:2,hgb:2,hct:2,plt:2,)  Recent Labs     04/22/20 0239 04/21/20 0236 04/20/20 0422    137 134*   K 4.9 5.4* 5.3*    103 103   CO2 25 24 21   BUN 96* 85* 81*   CREA 2.59* 2.76* 2.84*   * 123* 93   CA 7.4* 7.9* 8.4*   MG  --   --  2.6*   PHOS  --   --  3.5     Recent Labs     04/21/20 0236 04/20/20 0422   SGOT  --  489*   AP  --  728*   TP  --  6.6   ALB  --  2.6* GLOB  --  4.0   LPSE 2,184* >3,000*             Impression:    Widely metastatic CA to lungs, liver and bone    Acute pancreatitis    Elevated liver transaminases secondary to bulky liver mets    Acute respiratory failure with hypoxia    Paraplegia, complete     Spinal cord compression due to malignant neoplasm metastatic to spine        Plan:  Patient with acute pancreatitis that is clinically resolving and could be due to diffuse malignancy. He unfortunately has developed paraplegia from spinal mets and not yet accepting the severity of his diagnosis.   -from the standpoint of his liver he may progress to hepatic failure and develop hepatic encephalopathy that may not be reversible given the bulky liver mets  -can advance diet with lipase decreasing   -agree with the focus of care being moving in the direction of palliation and hospice; will sign off for now, call with questions       Jinny Groves MD    4/22/2020 [address] [facility], [address] [place] [address]  [address] [place] [address] Leslie: [phone]

## 2020-04-22 NOTE — PROGRESS NOTES
Bedside shift change report given to [facility] (oncoming nurse) by [place] (offgoing nurse). Report included the following information SBAR, Kardex and MAR.

## 2020-04-22 NOTE — PROGRESS NOTES
RAPID RESPONSE TEAM- Follow Up     Rounded on patient due to recent Code Stroke. Spoke with patient; no complaints at this time. No sensation or movement in BLE. Has BUE sensation and good strength. VSS. Patient Vitals for the past 12 hrs:   Temp Pulse Resp BP SpO2   04/21/20 1958 98.3 °F (36.8 °C) 84 18 94/60 91 %   04/21/20 1509 97.4 °F (36.3 °C) (!) 107 16 107/64 92 %   04/21/20 1134 97.8 °F (36.6 °C) (!) 109 16 100/66 93 %     Patient is in bed, alert, NAD. No RRT interventions indicated at this time. Please call back if needed.      Shannan Tapia RN  Ext. [phone]

## 2020-04-22 NOTE — INTERDISCIPLINARY ROUNDS
Oncology Interdisciplinary rounds were held today to discuss patient plan of care and outcomes. The following members were present: Nursing, Physician, Case Management, Pharmacy, and PT/OT Actual Length of Stay: 5 DRG GLOS: 4.2 Expected Length of Stay: 4d 4h 
 
 
 
               Plan            Discharge Palliative care Oncology following Radiation Oncology Patient is homeless. DATE:TBA

## 2020-04-22 NOTE — PROGRESS NOTES
Palliative Medicine Consult    Patient Name: Sánchez Kidd  YOB: 1960    Date of Initial Consult: 4/21/2020  Reason for Consult: Care Decisions  Requesting Provider: Hellen Rowe NP   Primary Care Physician: None      SUMMARY:   Sánchez Kidd is a 64 y.o. with newly-diagnosed widely metastatic disease, new onset paraplegia, CKD, systolic CHF who was admitted on 4/17/2020 with a diagnosis of AHFR, acute pancreatitis, elevated LFTs, elevated troponin, GUANAKO on CKD. On further evaluation he was found to have newly-diagnosed widely metastatic disease to the lungs, liver, adrenals, retroperitoneal LNs, and diffuse bony and intrathecal mets for which acute neurosurgical intervention was not indicated. He has also been evaluated by Neurology for new-onset paraplegic around T7-T8, for which he is currently receiving decadron for possible cord compression from metastatic disease. He has also been evaluated by Radiation Oncology, who indicated that radiation intervention is unlikely to provide neurologic benefit. He has been evaluated by GI for pancreatitis. Given the extent of widespread disease and overall poor prognosis, Oncology has recommended transitioning to hospice. Current medical issues leading to Palliative Medicine involvement include: Care decisions. PALLIATIVE DIAGNOSES:   1. Newly-diagnosed widely metastatic disease, likely lung primary per Oncology   2. New-onset paraplegia around T7-T8  3. CKD  4. Systolic CHF  5. Pancreatitis  6. Pain related to metastasis  7. Urinary retention  8. Fatigue/lethargy  9. Decreased appetite     PLAN:     1. I met with the patient, given he had already met with our SW to discuss goals of care, I focused on symptom management. He reports a mix of visceral and neuropathic pain in his back, abdomen and chest and rates it all 8/10. No feeling in his legs. He has only received only one 5mg  Norco at 0400 today.   He reports that he has asked for medication and only been given Tylenol (which is not on his order). He has not received any IV morphine. The gabapentin order first dose is not scheduled until 4pm.   -give 1mg IV morphine now (2pm). -(2:30PM) I changed the first gabapentin dose to start at 2pm (the earliest the computer would allow). -(4:30PM) morphine at 2pm helped a little but pain is still 8/10. Will change morphine order to 1.5mg IV q. 3  Hours prn.   -Narcan 0.4mg IV q. 2 min prn RR<8 and/or per protocol. 3. Patient's care discussed with bedside nurse, Palliative SW, hospice liason for coordination of care. GOALS OF CARE / TREATMENT PREFERENCES:   [====Goals of Care====]  GOALS OF CARE:  Patient/Health Care Proxy Stated Goals: Prolong life      TREATMENT PREFERENCES:   Code Status: Full Code    Advance Care Planning:  Advance Care Planning 4/17/2020   Patient's Healthcare Decision Maker is: Verbal statement (Legal Next of Kin remains as decision maker)   Confirm Advance Directive None   Patient Would Like to Complete Advance Directive No                 The palliative care team has discussed with patient / health care proxy about goals of care / treatment preferences for patient.  [====Goals of Care====]         HISTORY:     History obtained from: chart review, palliative IDT, bedside nurse, patient    CHIEF COMPLAINT: tired    HPI/SUBJECTIVE:    The patient is:   [x] Verbal and participatory  [] Non-participatory due to:     Patient says he cannot remember everything he has been told during this hospitalization but that he knows he is not doing well. \"Just keep me alive,\" he says at one point. We did discuss that his cancer is not curable. I introduced the concept of hospice as specialized care for those with serious illness who wish to focus on comfort and quality of life at the end of life (rather than pursuing tests, procedures, hospitalizations, etc.). After I introduced hospice he asked, Sherri Render we talk about his later? \" He asked to not continue further discussion, and so I was unable to complete full ROS due to this. Patient states he has one son Romario Hernandez and two grandchildren. He indicates that it would be okay to talk to his son about care decisions but that he would have to look for his son's number and does not know where his son is at any given moment since he is a . Clinical Pain Assessment (nonverbal scale for severity on nonverbal patients):   Clinical Pain Assessment  Severity: 8  Location: back radiating down leg, neck radiating down both arms, across chest and abdomen  Character: shooting, burning, deep and dull  Duration: month  Effect: cannot sit up  Factors: movement  Frequency: constant    Adult Nonverbal Pain Scale  Face: Occasional grimace, tearing, frowning, wrinkled forehead  Activity (Movement): Restless, excessive activity and/or withdrawal reflexes  Guarding: Rigid, stiff  Physiology (Vital Signs): Stable vital signs  Respiratory: Baseline RR/SpO2 compliant with ventilator  Total Score: 5       FUNCTIONAL ASSESSMENT:     Palliative Performance Scale (PPS):  PPS: 20       PSYCHOSOCIAL/SPIRITUAL SCREENING:     Advance Care Planning:  Advance Care Planning 4/17/2020   Patient's Healthcare Decision Maker is: Verbal statement (Legal Next of Kin remains as decision maker)   Confirm Advance Directive None   Patient Would Like to Complete Advance Directive No        Any spiritual / Evangelical concerns:  [] Yes /  [] No    Caregiver Burnout:  [] Yes /  [] No /  [x] No Caregiver Present      Anticipatory grief assessment:   [] Normal  / [] Maladaptive       ESAS Anxiety: Anxiety: 0    ESAS Depression: Depression: 5        REVIEW OF SYSTEMS:     Positive and pertinent negative findings in ROS are noted above in HPI. The following systems were [x] reviewed / [] unable to be reviewed as noted in HPI  Other findings are noted below.   Systems: constitutional, ears/nose/mouth/throat, respiratory, gastrointestinal, genitourinary, musculoskeletal, integumentary, neurologic, psychiatric, endocrine. Positive findings noted below. Modified ESAS Completed by: provider   Fatigue: 7 Drowsiness: 2   Depression: 5 Pain: 8   Anxiety: 0 Nausea: 0   Anorexia: 8 Dyspnea: 0     Constipation: No              PHYSICAL EXAM:     From RN flowsheet:  Wt Readings from Last 3 Encounters:   04/19/20 137 lb (62.1 kg)   04/20/20 136 lb 14.5 oz (62.1 kg)   04/17/20 150 lb (68 kg)     Blood pressure 92/66, pulse (!) 113, temperature 97.9 °F (36.6 °C), resp. rate 18, weight 137 lb (62.1 kg), SpO2 98 %. Pain Scale 1: Adult Nonverbal Pain Scale  Pain Intensity 1: 9  Pain Onset 1: weeks  Pain Location 1: Head  Pain Orientation 1: Upper  Pain Description 1: Aching  Pain Intervention(s) 1: Medication (see MAR), Relaxation technique, Repositioned, Rest  Last bowel movement, if known:     Constitutional: thin, chroically ill appearing, lying abed with eyes closed resting, wakes to voice, fatigued  Eyes: pupils equal, lids normal, no drainage  ENMT: no nasal discharge, moist mucous membranes  Cardiovascular: tachy, irreg  Respiratory: breathing not labored, normal rate, no audible wheezing  Gastrointestinal: distended, tender to deep palpation, +BS  Musculoskeletal: +generalized weakness, unable to move legs or feet  Skin: appears dry without apparent rash of face or arms  Neurologic: alert, answers direct questions  Psychiatric: blunted affect, answers questions appropriately     HISTORY:     Active Problems:    Acute respiratory failure with hypoxia ([address] (4/17/2020)      Paraplegia, complete ([address] (4/20/2020)      Spinal cord compression due to malignant neoplasm metastatic to spine ([address] (4/20/2020)      Metastatic carcinoma involving bone with unknown primary site [facility]) (4/22/2020)      Past Medical History:   Diagnosis Date    Hypotension     Vertigo       History reviewed. No pertinent surgical history.    Family History   Problem Relation Age of Onset    Heart Disease Mother     Other Father         Unknown    Other Child         Gunshot wounds      History reviewed, no pertinent family history. Social History     Tobacco Use    Smoking status: Former Smoker    Smokeless tobacco: Never Used   Substance Use Topics    Alcohol use: Yes     Comment: 2-3 cans of beer a day.       No Known Allergies   Current Facility-Administered Medications   Medication Dose Route Frequency    dexamethasone (DECADRON) 4 mg/mL injection 4 mg  4 mg IntraVENous Q6H    gabapentin (NEURONTIN) capsule 300 mg  300 mg Oral TID    morphine injection 1.5 mg  1.5 mg IntraVENous Q3H PRN    naloxone (NARCAN) injection 0.4 mg  0.4 mg IntraVENous EVERY 2 MINUTES AS NEEDED    pantoprazole (PROTONIX) tablet 40 mg  40 mg Oral ACB    0.9% sodium chloride infusion  75 mL/hr IntraVENous CONTINUOUS    tamsulosin (FLOMAX) capsule 0.4 mg  0.4 mg Oral DAILY    thiamine mononitrate (B-1) tablet 100 mg  100 mg Oral DAILY    therapeutic multivitamin (THERAGRAN) tablet 1 Tab  1 Tab Oral DAILY    lactobac ac& pc-s.therm-b.anim (PATRIZIA Q/RISAQUAD)  1 Cap Oral DAILY    metoprolol tartrate (LOPRESSOR) tablet 50 mg  50 mg Oral BID    doxycycline (VIBRA-TABS) tablet 100 mg  100 mg Oral Q12H    metoprolol (LOPRESSOR) injection 2.5 mg  2.5 mg IntraVENous Q6H PRN    sodium chloride (NS) flush 5-40 mL  5-40 mL IntraVENous Q8H    sodium chloride (NS) flush 5-40 mL  5-40 mL IntraVENous PRN    cefTRIAXone (ROCEPHIN) 1 g in 0.9% sodium chloride (MBP/ADV) 50 mL  1 g IntraVENous Q24H    HYDROcodone-acetaminophen (NORCO) 5-325 mg per tablet 1 Tab  1 Tab Oral Q4H PRN    docusate sodium (COLACE) capsule 100 mg  100 mg Oral BID    heparin (porcine) injection 5,000 Units  5,000 Units SubCUTAneous Q8H    prochlorperazine (COMPAZINE) with saline injection 5 mg  5 mg IntraVENous Q8H PRN          LAB AND IMAGING FINDINGS:     Lab Results   Component Value Date/Time    WBC 13.7 (H) 04/22/2020 02:39 AM HGB 12.6 04/22/2020 02:39 AM    PLATELET 976 17/51/7895 02:39 AM     Lab Results   Component Value Date/Time    Sodium 139 04/22/2020 02:39 AM    Potassium 4.9 04/22/2020 02:39 AM    Chloride 104 04/22/2020 02:39 AM    CO2 25 04/22/2020 02:39 AM    BUN 96 (H) 04/22/2020 02:39 AM    Creatinine 2.59 (H) 04/22/2020 02:39 AM    Calcium 7.4 (L) 04/22/2020 02:39 AM    Magnesium 2.6 (H) 04/20/2020 04:22 AM    Phosphorus 3.5 04/20/2020 04:22 AM      Lab Results   Component Value Date/Time    AST (SGOT) 535 (H) 04/22/2020 02:39 AM    Alk. phosphatase 686 (H) 04/22/2020 02:39 AM    Protein, total 6.0 (L) 04/22/2020 02:39 AM    Albumin 2.3 (L) 04/22/2020 02:39 AM    Globulin 3.7 04/22/2020 02:39 AM     No results found for: INR, PTMR, PTP, PT1, PT2, APTT, INREXT, INREXT   Lab Results   Component Value Date/Time    Iron 62 04/19/2020 03:29 AM    TIBC 183 (L) 04/19/2020 03:29 AM    Iron % saturation 34 04/19/2020 03:29 AM    Ferritin 1,043 (H) 04/19/2020 03:29 AM      No results found for: PH, PCO2, PO2  No components found for: [place]   Lab Results   Component Value Date/Time     (H) 04/17/2020 04:32 PM    CK - MB 4.2 (H) 04/17/2020 04:32 PM                Total time:   Counseling / coordination time, spent as noted above:   > 50% counseling / coordination?:     Prolonged service was provided for  []30 min   []75 min in face to face time in the presence of the patient, spent as noted above. Time Start:   Time End:   Note: this can only be billed with 22562 (initial) or 29548 (follow up). If multiple start / stop times, list each separately.

## 2020-04-22 NOTE — PROGRESS NOTES
I reviewed pertinent labs and imaging, and discussed /agreed on the plan of care with Dr. Yuliet Hannon. Hospitalist Progress Note    NAME: Melany Cody   :  1960   MRN:  [id number]     Assessment / Plan:  New onset paraplegia around T7-T8  Metastatic disease to liver, lungs, bone adrenals, and retroperitoneal LNs  · Poor prognosis   · Widespread metastatic diease found during this admission   · Multiple spinal metastasis. New onset bilateral lower extremity paraplegia. Neurologic exam sensory level T7-T8  · MRI lumbar spine:  Imaging findings consistent with widespread osseous metastatic disease. Largest lesion at L4 and in the left iliac bone with large lesions at S1 and S2 as well. No associated acute/chronic compression deformity. No canal compromise. Atypical cord signal intensity at the L2 level. Metastatic focus not excluded. Possible nodular densities associated with cauda equina nerve roots. Intrathecal metastatic disease is not excluded. The patient was not administered IV contrast. Retroperitoneal adenopathy and hepatic masses. · MRI thorac spine: 1. Severe motion artifact. 2. Heterogeneous appearance of the thoracic spinal cord from T3 to the thoracolumbar junction as above, concerning for metastatic involvement which may be leptomeningeal or intramedullary, particularly given the abnormal appearance on lumbar spine MRI. 3. Osseous metastatic disease without compression fracture. Epidural tumor extension cannot be completely excluded. 4. See recent CT chest abdomen pelvis findings regarding lung mass, adenopathy, hepatic and adrenal metastases. · MRI cervical spine:  1. Significant technical artifact due to motion as above. 2. Osseous metastasis T1 vertebral body. No acute or subacute fracture. No abnormality involving the cervical spinal cord allowing for motion. See separate thoracic MRI report regarding cord abnormality in the upper thoracic region.   3. Partly visualized upper mediastinal and supraclavicular lymphadenopathy. · Continue high dose Decadron  · Neurosurgery states there is nothing they are able to do for patient   · Appreciate palliative/hospice input   · Appreciate oncology input   · Appreciate neurology input   · CT head negative  · Patient refused MRI of brain and LP  · Patient is not interested in radiation     Acute hypoxic respiratory failure secondary to   Pneumonia   Possible COPD  Tobacco abuse disorder   · COVID-19 ruled out   · Wean O2 for sats >93%  · CXR:  Stable right infrahilar opacity is concerning for underlying neoplasm. Potential right paratracheal lymphadenopathy. Correlation with chest CT is recommended following resolution of the COVID 19 status. · CT chest:  Mediastinal, right hilar, and retroperitoneal lymphadenopathy. Right lower lobe mass lesion as well as postobstructive consolidation. Interstitial infiltrate is noted throughout the right upper lobe. Pleural-based mass lesions throughout the right upper lobe. Hepatic metastases. Left adrenal masses concerning for metastatic disease. Marked distention of the bladder with soft tissue nodule involving the left bladder wall may represent an omental metastasis. · BC NGTD  · Procalcitonin 33.67  · Continue IV Rocephin and PO doxycycline   · Appreciate pulmonary input     GUANAKO on CKD IV  · Cr 2.59; improved from 3.80 on admission   · Maintain rodriguez d/t urinary retention  · Continue Flomax  · Continue IVF   · Avoid nephrotoxins  · Appreciate nephrology input     Acute pancreatitis  Elevated LFTs  Hepatomegaly  · Lipase 1,711; improved   · , , ; all increasing   · CT abd/pel:  Mediastinal, right hilar, and retroperitoneal lymphadenopathy. Right lower lobe mass lesion as well as postobstructive consolidation. Interstitial infiltrate is noted throughout the right upper lobe. Pleural-based mass lesions throughout the right upper lobe. Hepatic metastases.  Left adrenal masses concerning for metastatic disease. Marked distention of the bladder with soft tissue nodule involving the left bladder wall may represent an omental metastasis. · Appreciate GI input   · Abd US:  1. Diffuse hepatic metastases. 2.  No intrahepatic biliary dilation. 3.  Dilated common bile duct measuring 9 mm. 4.  Normal gallbladder    Chronic systolic heart failure, diagnosed 02/2020  Secondary hyperaldosteronism d/t HF  Elevated troponin   Hx of SVT  · Troponin 0.35->0.33->0.31->0.29  · ECHO with EF 25-30% (02/2020)  · Continue BB     18.5 - 24.9 Normal weight / Body mass index is 20.23 kg/m². Code status: Full  Prophylaxis: Hep SQ  Recommended Disposition: TBD- recommend transitioning to hospice care      Subjective:     Chief Complaint / Reason for Physician Visit  Follow-up paraplegia. Discussed with RN events overnight. Review of Systems:  Symptom Y/N Comments  Symptom Y/N Comments   Fever/Chills n   Chest Pain n    Poor Appetite n   Edema     Cough    Abdominal Pain     Sputum    Joint Pain     SOB/GRIFFIN n   Pruritis/Rash     Nausea/vomit    Tolerating PT/OT     Diarrhea    Tolerating Diet y    Constipation    Other       Could NOT obtain due to:      Objective:     VITALS:   Last 24hrs VS reviewed since prior progress note. Most recent are:  Patient Vitals for the past 24 hrs:   Temp Pulse Resp BP SpO2   04/22/20 1145 97.9 °F (36.6 °C) 96 16 108/68 90 %   04/22/20 0800 97.5 °F (36.4 °C) 97 18 99/63 95 %   04/22/20 0344 98.5 °F (36.9 °C) (!) 106 18 112/58 91 %   04/22/20 0004 97.9 °F (36.6 °C) 94 18 110/66 95 %   04/21/20 1958 98.3 °F (36.8 °C) 84 18 94/60 91 %   04/21/20 1509 97.4 °F (36.3 °C) (!) 107 16 107/64 92 %       Intake/Output Summary (Last 24 hours) at 4/22/2020 1259  Last data filed at 4/22/2020 0430  Gross per 24 hour   Intake 60 ml   Output 1100 ml   Net -1040 ml        PHYSICAL EXAM:  General: NAD. Chronically ill-appearing frail AA male. EENT:  EOMI. Anicteric sclerae.  MMM  Resp:  CTA bilaterally, no wheezing or rales. No accessory muscle use  CV:  RRR, No edema  GI:  Firm,  +Bowel sounds, hepatomegaly   Neurologic:  A&O, paraplegia to BLE  Psych:   Fair insight. Not anxious nor agitated  Skin:  No rashes. No jaundice    Reviewed most current lab test results and cultures  YES  Reviewed most current radiology test results   YES  Review and summation of old records today    NO  Reviewed patient's current orders and MAR    YES  PMH/SH reviewed - no change compared to H&P  ________________________________________________________________________  Care Plan discussed with:    Comments   Patient x    Family      RN x    Care Manager x    Consultant  x                      Multidiciplinary team rounds were held today with , nursing, pharmacist and clinical coordinator. Patient's plan of care was discussed; medications were reviewed and discharge planning was addressed. ________________________________________________________________________  Total NON critical care TIME:  25   Minutes    Total CRITICAL CARE TIME Spent:   Minutes non procedure based      Comments   >50% of visit spent in counseling and coordination of care     ________________________________________________________________________  Megha Fragoso NP     Procedures: see electronic medical records for all procedures/Xrays and details which were not copied into this note but were reviewed prior to creation of Plan. LABS:  I reviewed today's most current labs and imaging studies.   Pertinent labs include:  Recent Labs     04/22/20  0239 04/21/20  0236 04/20/20  0422   WBC 13.7* 7.5 9.8   HGB 12.6 13.3 14.1   HCT 36.8 39.7 42.1    156 178     Recent Labs     04/22/20  0239 04/21/20  0236 04/20/20  0422    137 134*   K 4.9 5.4* 5.3*    103 103   CO2 25 24 21   * 123* 93   BUN 96* 85* 81*   CREA 2.59* 2.76* 2.84*   CA 7.4* 7.9* 8.4*   MG  --   --  2.6*   PHOS  --   --  3.5   ALB 2.3*  --  2.6*   TBILI 0.9  -- 1.9*   SGOT 535*  --  489*   *  --  270*       Signed: Ivy Farah NP

## 2020-04-22 NOTE — PROGRESS NOTES
Bedside and Verbal shift change report given to [address] [place] [address] (oncoming nurse) by Mai Mejia (offgoing nurse). Report included the following information SBAR, Kardex, Intake/Output, MAR, Recent Results and Cardiac Rhythm NSR.  Patient rested well this night sleeping at long intervals, complained of back pain X1 with IV morhine given and compained of headache with norco given, rodriguez in place and draining well, IV infusing without difficulty

## 2020-04-22 NOTE — PROGRESS NOTES
Occupational Therapy    Spoke with PT who reports patient declined working with therapy today. Will defer OT per patient request, but continue to follow.

## 2020-04-22 NOTE — PROGRESS NOTES
[facility]  Pulmonary, Critical Care, and Sleep Medicine    Name: Geovanny Hernandez MRN: [id number]   : 1960 Hospital: [facility]   Date: 2020        Pulmonary Note        : On RA. Denies shortness of breath. Has cough productive of white sputum. COVID negative. ; hard to get comfortable. Seen earlier. RRT assessment by  noted. Hard to get comfortable. RLL chest hurts. No SOB on room air     hard to get comfortable. No chest pain. No congestion    IMPRESSION:   · RLL lung mass with mediastinal and hilar adenopathy- Suspected Lung Cancer, liver mets? Adrenal Masses, LAD. · Paraplegia- new from metastatic disease  · COVID negative  · No overt Pneumonia. Does have a lung mass. · Acute on Chronic Renal failure  · Smoker most likely has COPD. · Bladder lesion  · Systolic CHF: based on echo from 20: LVEF of 70%, Diastolic Dysfunction, Mild Pulmonary Htn, Moderate Mitral Regurg. · SVT, may need cards eval. Currently in SR  · Increased LFTs  · Etoh use, at lease 2-3 beers per day. · Lipase over 3K, suggest pancreatitis, liver mets, Reviewed note of  Dr. Sylvie Claude. · Homeless, but has been living in a hotel. RECOMMENDATIONS:   · Nothing to contribute at this time  · Palliative care following, if tissye dx needed agree with liver bx, as first step. This would give pathologic dx and staging information  · Will see again upon request     Subjective/History: This patient has been seen and evaluated at the request of Dr. Mika Pastor for above. Patient is a 64 y.o. male   Who presents for above. Pt seems to be a marginal hx. He seems to give different reports to different providers. He was hospitalized at [facility]. Has a chronic cough and vertigo. Has been with weakness of generalized abdominal pain. Has been ongoing for above 6 weeks. Past Medical History:   Diagnosis Date    Hypotension     Vertigo       History reviewed.  No pertinent surgical history. Prior to Admission medications    Medication Sig Start Date End Date Taking? Authorizing Provider   amoxicillin-clavulanate (AUGMENTIN) 875-125 mg per tablet Take 1 Tab by mouth two (2) times a day. 2/15/20   Lydia Aragon MD     Current Facility-Administered Medications   Medication Dose Route Frequency    gabapentin (NEURONTIN) capsule 300 mg  300 mg Oral TID    dexamethasone (DECADRON) 4 mg/mL injection 4 mg  4 mg IntraVENous Q6H    pantoprazole (PROTONIX) tablet 40 mg  40 mg Oral ACB    0.9% sodium chloride infusion  100 mL/hr IntraVENous CONTINUOUS    tamsulosin (FLOMAX) capsule 0.4 mg  0.4 mg Oral DAILY    thiamine mononitrate (B-1) tablet 100 mg  100 mg Oral DAILY    therapeutic multivitamin (THERAGRAN) tablet 1 Tab  1 Tab Oral DAILY    lactobac ac& pc-s.therm-b.anim (PATRIZIA Q/RISAQUAD)  1 Cap Oral DAILY    metoprolol tartrate (LOPRESSOR) tablet 50 mg  50 mg Oral BID    doxycycline (VIBRA-TABS) tablet 100 mg  100 mg Oral Q12H    sodium chloride (NS) flush 5-40 mL  5-40 mL IntraVENous Q8H    cefTRIAXone (ROCEPHIN) 1 g in 0.9% sodium chloride (MBP/ADV) 50 mL  1 g IntraVENous Q24H    docusate sodium (COLACE) capsule 100 mg  100 mg Oral BID    heparin (porcine) injection 5,000 Units  5,000 Units SubCUTAneous Q8H     No Known Allergies   Social History     Tobacco Use    Smoking status: Former Smoker    Smokeless tobacco: Never Used   Substance Use Topics    Alcohol use: Yes     Comment: 2-3 cans of beer a day. Family History   Problem Relation Age of Onset    Heart Disease Mother     Other Father         Unknown    Other Child         Gunshot wounds        Review of Systems:  A comprehensive review of systems was negative.     Objective:   Vital Signs:    Visit Vitals  BP 99/63 (BP 1 Location: Right arm, BP Patient Position: At rest)   Pulse 97   Temp 97.5 °F (36.4 °C)   Resp 18   Wt 62.1 kg (137 lb)   SpO2 95%   BMI 20.23 kg/m²       O2 Device: Room air O2 Flow Rate (L/min): 2 l/min   Temp (24hrs), Av.9 °F (36.6 °C), Min:97.4 °F (36.3 °C), Max:98.5 °F (36.9 °C)       Intake/Output:   Last shift:      No intake/output data recorded.   Last 3 shifts:  1901 -  0700  In: 61 [P.O.:60]  Out: 2125 [Urine:2125]    Intake/Output Summary (Last 24 hours) at 2020 1134  Last data filed at 2020 0430  Gross per 24 hour   Intake 60 ml   Output 1100 ml   Net -1040 ml     Hemodynamics:   PAP:   CO:     Wedge:   CI:     CVP:    SVR:       PVR:       Ventilator Settings:  Mode Rate Tidal Volume Pressure FiO2 PEEP                    Peak airway pressure:      Minute ventilation:        Physical Exam:    General:  Alert, cooperative   Head:    Eyes:     Nose:    Throat:    Neck:    Back:      Lungs:   Diminished breath sounds in R base o/w CTA   Chest wall:     Heart:  RRR   Abdomen:   Soft, normal bowel sounds   Extremities: No edema   Pulses:    Skin:    Lymph nodes:    Neurologic: Oriented x 3, no gross deficits       Data:     Recent Results (from the past 24 hour(s))   CBC W/O DIFF    Collection Time: 20  2:39 AM   Result Value Ref Range    WBC 13.7 (H) 4.1 - 11.1 K/uL    RBC 3.63 (L) 4.10 - 5.70 M/uL    HGB 12.6 12.1 - 17.0 g/dL    HCT 36.8 36.6 - 50.3 %    .4 (H) 80.0 - 99.0 FL    MCH 34.7 (H) 26.0 - 34.0 PG    MCHC 34.2 30.0 - 36.5 g/dL    RDW 15.0 (H) 11.5 - 14.5 %    PLATELET 361 546 - 182 K/uL    MPV 10.4 8.9 - 12.9 FL    NRBC 0.0 0  WBC    ABSOLUTE NRBC 0.00 0.00 - 7.46 K/uL   METABOLIC PANEL, BASIC    Collection Time: 20  2:39 AM   Result Value Ref Range    Sodium 139 136 - 145 mmol/L    Potassium 4.9 3.5 - 5.1 mmol/L    Chloride 104 97 - 108 mmol/L    CO2 25 21 - 32 mmol/L    Anion gap 10 5 - 15 mmol/L    Glucose 133 (H) 65 - 100 mg/dL    BUN 96 (H) 6 - 20 MG/DL    Creatinine 2.59 (H) 0.70 - 1.30 MG/DL    BUN/Creatinine ratio 37 (H) 12 - 20      GFR est AA 31 (L) >60 ml/min/1.73m2    GFR est non-AA 26 (L) >60 ml/min/1.73m2 Calcium 7.4 (L) 8.5 - 10.1 MG/DL   LIPASE    Collection Time: 04/22/20  2:39 AM   Result Value Ref Range    Lipase 1,711 (H) 73 - 393 U/L   HEPATIC FUNCTION PANEL    Collection Time: 04/22/20  2:39 AM   Result Value Ref Range    Protein, total 6.0 (L) 6.4 - 8.2 g/dL    Albumin 2.3 (L) 3.5 - 5.0 g/dL    Globulin 3.7 2.0 - 4.0 g/dL    A-G Ratio 0.6 (L) 1.1 - 2.2      Bilirubin, total 0.9 0.2 - 1.0 MG/DL    Bilirubin, direct 0.5 (H) 0.0 - 0.2 MG/DL    Alk. phosphatase 686 (H) 45 - 117 U/L    AST (SGOT) 535 (H) 15 - 37 U/L    ALT (SGPT) 295 (H) 12 - 78 U/L             Telemetry:Sinus tachycardia with premature atrial complexes in a pattern of bigeminy   Left anterior fascicular block   Inferior infarct (cited on or before 10-FEB-2020)   Cannot rule out Anterior infarct , age undetermined   Abnormal ECG   When compared with ECG of 10-FEB-2020 23:05,   premature ventricular complexes are no longer present   premature atrial complexes are now present   Left anterior fascicular block is now present   Nonspecific T wave abnormality now evident in Inferior leads     Imaging:  I have personally reviewed the patients radiographs and have reviewed the reports:  4-17-20: IMPRESSION:  Mediastinal, right hilar, and retroperitoneal lymphadenopathy. Right lower lobe  mass lesion as well as postobstructive consolidation. Interstitial infiltrate is  noted throughout the right upper lobe. Pleural-based mass lesions throughout the  right upper lobe. Hepatic metastases. Left adrenal masses concerning for  metastatic disease. Marked distention of the bladder with soft tissue nodule  involving the left bladder wall may represent an omental metastasis. 4-17-20: CXR: is consistent with prior lung mass. NO acute or new infiltrates.       Renea Aaron MD

## 2020-04-22 NOTE — PROGRESS NOTES
Physical Therapy    Note in chart pt still not open to hospice but is refusing procedures. He declines PT for today. Will follow if and when appropriate.     Moisés Chambers, PT

## 2020-04-22 NOTE — PROGRESS NOTES
Palliative Medicine  [facility]: [phone]  [facility]: [phone] ([phone])      GOALS OF CARE: Full Restorative care at this time, Full Code. However patient is giving conflicting messages regarding care as he often refuses recommended services such as central line, PT/OT etc Patient has lived \"on the streets\" for the past 5-6 years, likely has not followed up with much medical care. He did voice yesterday \"just keep me alive\" to Dr Mickey Albert. Hospice was mentioned at that time but he was not ready to talk about that in much detail. TREATMENT PREFERENCES:   Code Status: Full Code    Advance Care Planning: Not interested at this time, does not have any family contact phone numbers. He has one biological son. Patient does not have any contact numbers for relatives or friends. He did share that he has \"one son\" whose name is Madhav. Patient noted that Cathie Donaldson is a  and he does not consistently hear from his son as he is \"driving most of the time\". Patient did not seem to have any desire to let his son know that he is hospitalized, his relationship with others people appears to be distant, he voiced that people have not been there for him. He does not know his son's phone number, he indicates that he does not have his cell phone (\"it is in storage\" per patient), there are no other friends or relatives that he knows of to get in touch with. It is presumed that patient has not had much contact with family based on his life-style/ being homeless. Patient / Family Encounter Documentation    Participants (names): Patient and LCSW    Narrative: Francois Chowdhury is an unfortunate 63 yo AA man who came to the ER complaining of lethargy and abdominal pain. At the time of this hospital stay, imaging revealed wide spread metastatic disease in the lung, mediastinum, retroperitoneal LN, adrenals and bone. Yesterday, he had sudden onset of lower extremity weakness.  Imaging shows no epidural compression but diffuse metastatic disease. Neurosurgery consulted and feels his symptoms are not from cord compression but from intrathecal disease. Patient is now bedbound/ paralyzed from waist down. Chart review indicates that he reported being hospitalized about 5 years ago at [facility]. He was hospitalized in 2/10/20 at [facility], admitted for PNA, found to have SVT and EF 25%, declined Cardiac Cath at that time, also diagnosed with AKD at that time. At the time of this visit, patient is welcoming, asked LCSW to sit down in his room, noted, \"I think you may be able to help me\". He discussed the pain in his back, wanting relief from it and noted that he felt \"bloated\", pointed to his IV and indicated, \"maybe this should be stopped for a while? \". He was agreeable to turning on a light in his room and shared some information, not much about his personal life or relationships. Alert and oriented, able to engage. Psychosocial Issues Identified: 1. Patient has lived \"on the streets\" for the past 5- 6 years, per his report ([address] [place] [address]) . 2. Risk Factors:   Past notes indicate a 35-40 yr history of smoking; he reportedly stopped 3 years ago, some history of ETOH abuse noted in chart as well. 3. Few to no social supports. No family contacts or close friends. He has a son, Tammy Shetty, does not know his phone number, notes son is a , does not appear to have a close relationship with son. He cannot or will not share about any other supports. 4. Poor follow through of medical needs in the past and at present. Patient is declining certain medical recommendations, he did not follow through in the past with recommendations, yet noted that he \"wants to live\". 5. Patient still processing medical information: When asked what his understanding is of his medical issues, he mainly talked about his heart issues (which were diagnosed at time of hospital stay at [facility] in 2/20).  He did not mention his cancer diagnosis at all, LCSW shared with him that the cancer is what is causing a lot of his current problems. He did not have any questions, he seemed to hear this, but likely will need continued education about his disease and options (not many). 6. Patient's goals: \"To walk and get out of here\". Explored with patient why he refused PT/OT today, if that is his goal. He does not remember refusing services. Explained to him that it is uncertain how much he can recover if he does not try and based on his diagnosis his goals may or may not be met. LCSW explained that it would be important for patient to go to a setting where he can be cared for and talked about a NH setting. Patient seemed to understand this. LCSW also briefly mentioned that if there is no treatment for his medical condition, that focusing on his comfort may be what is important to try to do at this time. Patient will need to have continued conversations about this. It would be helpful for patient to have a POST form, to note his wishes, once he has a good understanding of his condition and prognosis. 7. Work History: Patient worked as a , noted he stopped working because [employer] back gave out\". 8. Emotionally patient is not exhibiting any overt grief at this time, denies worries or fears. He noted that if there is nothing that can be done about his situation, he can't worry about it or do anything about it. He notes he is spiritual and has pablo in God. He appears slightly depressed, although he did not talk about it. He asked for a fresh cup of ice and something for his dry lips. LCSW gave him fresh ice and asked tech to bring him some vaseline for his lips. Interventions: Relationship building, assessment of psychosocial needs, education regarding medical condition and emotional support, validation of his unfortunate situation.      Goals of Care / Plan: Follow patient while here, assist with goals of care discussions along with palliative team.

## 2020-04-23 LAB
ANION GAP SERPL CALC-SCNC: 11 MMOL/L (ref 5–15)
ATRIAL RATE: 116 BPM
BUN SERPL-MCNC: 111 MG/DL (ref 6–20)
BUN/CREAT SERPL: 37 (ref 12–20)
CALCIUM SERPL-MCNC: 7.2 MG/DL (ref 8.5–10.1)
CALCULATED P AXIS, ECG09: 85 DEGREES
CALCULATED R AXIS, ECG10: -57 DEGREES
CALCULATED T AXIS, ECG11: 81 DEGREES
CHLORIDE SERPL-SCNC: 107 MMOL/L (ref 97–108)
CO2 SERPL-SCNC: 22 MMOL/L (ref 21–32)
CREAT SERPL-MCNC: 2.97 MG/DL (ref 0.7–1.3)
DIAGNOSIS, 93000: NORMAL
ERYTHROCYTE [DISTWIDTH] IN BLOOD BY AUTOMATED COUNT: 15.4 % (ref 11.5–14.5)
GLUCOSE SERPL-MCNC: 117 MG/DL (ref 65–100)
HCT VFR BLD AUTO: 39.5 % (ref 36.6–50.3)
HGB BLD-MCNC: 13.1 G/DL (ref 12.1–17)
MAGNESIUM SERPL-MCNC: 2.5 MG/DL (ref 1.6–2.4)
MCH RBC QN AUTO: 34.4 PG (ref 26–34)
MCHC RBC AUTO-ENTMCNC: 33.2 G/DL (ref 30–36.5)
MCV RBC AUTO: 103.7 FL (ref 80–99)
NRBC # BLD: 0 K/UL (ref 0–0.01)
NRBC BLD-RTO: 0 PER 100 WBC
P-R INTERVAL, ECG05: 116 MS
PLATELET # BLD AUTO: 163 K/UL (ref 150–400)
PMV BLD AUTO: 10.8 FL (ref 8.9–12.9)
POTASSIUM SERPL-SCNC: 5.7 MMOL/L (ref 3.5–5.1)
PROCALCITONIN SERPL-MCNC: 40.19 NG/ML
Q-T INTERVAL, ECG07: 330 MS
QRS DURATION, ECG06: 78 MS
QTC CALCULATION (BEZET), ECG08: 458 MS
RBC # BLD AUTO: 3.81 M/UL (ref 4.1–5.7)
SODIUM SERPL-SCNC: 140 MMOL/L (ref 136–145)
VENTRICULAR RATE, ECG03: 116 BPM
WBC # BLD AUTO: 14.2 K/UL (ref 4.1–11.1)

## 2020-04-23 PROCEDURE — 85027 COMPLETE CBC AUTOMATED: CPT

## 2020-04-23 PROCEDURE — 74011250637 HC RX REV CODE- 250/637: Performed by: INTERNAL MEDICINE

## 2020-04-23 PROCEDURE — 74011250636 HC RX REV CODE- 250/636: Performed by: GENERAL ACUTE CARE HOSPITAL

## 2020-04-23 PROCEDURE — 94760 N-INVAS EAR/PLS OXIMETRY 1: CPT

## 2020-04-23 PROCEDURE — 74011250637 HC RX REV CODE- 250/637: Performed by: NURSE PRACTITIONER

## 2020-04-23 PROCEDURE — 74011250636 HC RX REV CODE- 250/636: Performed by: NURSE PRACTITIONER

## 2020-04-23 PROCEDURE — 65660000000 HC RM CCU STEPDOWN

## 2020-04-23 PROCEDURE — 74011250637 HC RX REV CODE- 250/637: Performed by: GENERAL ACUTE CARE HOSPITAL

## 2020-04-23 PROCEDURE — 36415 COLL VENOUS BLD VENIPUNCTURE: CPT

## 2020-04-23 PROCEDURE — 74011000258 HC RX REV CODE- 258: Performed by: GENERAL ACUTE CARE HOSPITAL

## 2020-04-23 PROCEDURE — 74011250636 HC RX REV CODE- 250/636: Performed by: PSYCHIATRY & NEUROLOGY

## 2020-04-23 PROCEDURE — 83735 ASSAY OF MAGNESIUM: CPT

## 2020-04-23 PROCEDURE — 84145 PROCALCITONIN (PCT): CPT

## 2020-04-23 PROCEDURE — 77010033678 HC OXYGEN DAILY

## 2020-04-23 PROCEDURE — 80048 BASIC METABOLIC PNL TOTAL CA: CPT

## 2020-04-23 PROCEDURE — 74011250637 HC RX REV CODE- 250/637: Performed by: PSYCHIATRY & NEUROLOGY

## 2020-04-23 RX ORDER — GABAPENTIN 300 MG/1
300 CAPSULE ORAL 2 TIMES DAILY
Status: DISCONTINUED | OUTPATIENT
Start: 2020-04-23 | End: 2020-04-23

## 2020-04-23 RX ORDER — FUROSEMIDE 20 MG/1
20 TABLET ORAL DAILY
Status: DISCONTINUED | OUTPATIENT
Start: 2020-04-23 | End: 2020-04-24 | Stop reason: HOSPADM

## 2020-04-23 RX ORDER — GABAPENTIN 300 MG/1
600 CAPSULE ORAL 2 TIMES DAILY
Status: DISCONTINUED | OUTPATIENT
Start: 2020-04-23 | End: 2020-04-24 | Stop reason: HOSPADM

## 2020-04-23 RX ADMIN — DEXAMETHASONE SODIUM PHOSPHATE 4 MG: 4 INJECTION, SOLUTION INTRAMUSCULAR; INTRAVENOUS at 13:50

## 2020-04-23 RX ADMIN — DEXAMETHASONE SODIUM PHOSPHATE 4 MG: 4 INJECTION, SOLUTION INTRAMUSCULAR; INTRAVENOUS at 06:28

## 2020-04-23 RX ADMIN — TAMSULOSIN HYDROCHLORIDE 0.4 MG: 0.4 CAPSULE ORAL at 08:58

## 2020-04-23 RX ADMIN — HYDROCODONE BITARTRATE AND ACETAMINOPHEN 1 TABLET: 5; 325 TABLET ORAL at 04:31

## 2020-04-23 RX ADMIN — HEPARIN SODIUM 5000 UNITS: 5000 INJECTION INTRAVENOUS; SUBCUTANEOUS at 06:28

## 2020-04-23 RX ADMIN — Medication 10 ML: at 14:00

## 2020-04-23 RX ADMIN — Medication 10 ML: at 06:28

## 2020-04-23 RX ADMIN — HYDROCODONE BITARTRATE AND ACETAMINOPHEN 1 TABLET: 5; 325 TABLET ORAL at 08:57

## 2020-04-23 RX ADMIN — HYDROCODONE BITARTRATE AND ACETAMINOPHEN 1 TABLET: 5; 325 TABLET ORAL at 17:22

## 2020-04-23 RX ADMIN — FUROSEMIDE 20 MG: 20 TABLET ORAL at 11:02

## 2020-04-23 RX ADMIN — GABAPENTIN 600 MG: 300 CAPSULE ORAL at 17:41

## 2020-04-23 RX ADMIN — DEXAMETHASONE SODIUM PHOSPHATE 4 MG: 4 INJECTION, SOLUTION INTRAMUSCULAR; INTRAVENOUS at 17:22

## 2020-04-23 RX ADMIN — PANTOPRAZOLE SODIUM 40 MG: 40 TABLET, DELAYED RELEASE ORAL at 07:30

## 2020-04-23 RX ADMIN — CEFTRIAXONE 1 G: 1 INJECTION, POWDER, FOR SOLUTION INTRAMUSCULAR; INTRAVENOUS at 22:17

## 2020-04-23 RX ADMIN — DEXAMETHASONE SODIUM PHOSPHATE 4 MG: 4 INJECTION, SOLUTION INTRAMUSCULAR; INTRAVENOUS at 23:54

## 2020-04-23 RX ADMIN — Medication 1 CAPSULE: at 08:57

## 2020-04-23 RX ADMIN — MORPHINE SULFATE 1.5 MG: 2 INJECTION, SOLUTION INTRAMUSCULAR; INTRAVENOUS at 23:53

## 2020-04-23 RX ADMIN — GABAPENTIN 300 MG: 300 CAPSULE ORAL at 08:58

## 2020-04-23 RX ADMIN — HEPARIN SODIUM 5000 UNITS: 5000 INJECTION INTRAVENOUS; SUBCUTANEOUS at 22:20

## 2020-04-23 RX ADMIN — DEXAMETHASONE SODIUM PHOSPHATE 4 MG: 4 INJECTION, SOLUTION INTRAMUSCULAR; INTRAVENOUS at 00:29

## 2020-04-23 RX ADMIN — THERA TABS 1 TABLET: TAB at 08:58

## 2020-04-23 RX ADMIN — HEPARIN SODIUM 5000 UNITS: 5000 INJECTION INTRAVENOUS; SUBCUTANEOUS at 13:50

## 2020-04-23 RX ADMIN — THIAMINE HCL TAB 100 MG 100 MG: 100 TAB at 08:57

## 2020-04-23 RX ADMIN — Medication 10 ML: at 22:19

## 2020-04-23 NOTE — PROGRESS NOTES
Consult dictated: Patient has a sensory level around T7-T8 just below the nipples bilaterally, this looks like cord compression from metastatic disease, ordering stat MRI and have talked MRI scan, started on high-dose Decadron and consulting neurosurgery, may need radiation therapy but we do not really have a tissue diagnosis yet, difficult case. Consult  REFERRED BY:  None    CHIEF COMPLAINT: 1 month history of weakness and numbness in his legs      Subjective: Jayden Mix is a 64 y.o. right-handed -American male we are asked to evaluate at the request of Dr. Marya Arriola of sudden leg weakness bilaterally and became complete early morning April 20, 2020.,  And patient MRI shows diffuse cord enlargement and meningeal enhancement throughout large portions of the thoracic spine, and diffuse bony metastatic disease throughout the spine, but no clear evidence of cord compression of the spinal canal.  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 Neurosurgery says they have nothing to offer. Patient was on high-dose Decadron 20 mg every 6 hours and no improvement at all. I have asked radiation therapy to see the patient to consider if there is any therapy they can give, and they offered empiric palliative radiation but the patient refused. I have asked hematology to see the patient to see if there is any medicinal therapy available, and they will see the patient later today. I told the patient intrathecal therapy would be an option if he wanted it, but that would require either inserting a port into the CNS, repeated spinal taps, and he refused that. He refuses to get an MRI of the brain just to check to make sure there is no brain mets 2. We will get a CAT scan he will will do that. We cannot give him contrast.  Patient has made no improvement in his paraplegia and is unchanged.   The patient has lesions in his lung described as mediastinal, right hilar, and retroperitoneal lymphadenopathy. Right lower lobe mass is seen, interstitial infiltrate. Throughout the right upper lobe, pleural-based masses are also noted in the right upper lobe, he has hepatic metastases left adrenal masses marked distention of the bladder with soft tissue nodule involving the bladder and possible omental metastases. Patient is not a smoker. Patient has been complaining of back pain and gradual progressive weakness and numbness in his legs for the last month, having difficulty walking and having a drinks of around the last month. He denies any recent fall but did have an infection on admission. He is COVID negative. Patient is an ex-smoker. He denies any difficulty speaking swallowing or neck pain or headache or cranial nerve problems. He has difficulty with bowel or bladder control and movements. He has a markedly distended bladder on his CT scans. Patient has a sensory level around T7-T8 on neurologic examination complete flaccid paraplegia from the lower levels down. He has no difficulty with weakness or numbness in his arms. We will reduce his steroids because of no benefit to prevent the metabolic complications  Patient on gabapentin 300 mg 3 times a day and still complains of severe pain with normal drowsiness or sedation. Says he needs more pain relief. Patient going to palliative care for well. Past Medical History:   Diagnosis Date    Hypotension     Vertigo       History reviewed. No pertinent surgical history. Family History   Problem Relation Age of Onset    Heart Disease Mother     Other Father         Unknown    Other Child         Gunshot wounds      Social History     Tobacco Use    Smoking status: Former Smoker    Smokeless tobacco: Never Used   Substance Use Topics    Alcohol use: Yes     Comment: 2-3 cans of beer a day.           Current Facility-Administered Medications:     gabapentin (NEURONTIN) capsule 300 mg, 300 mg, Oral, BID, Lola Vega MD    furosemide (LASIX) tablet 20 mg, 20 mg, Oral, DAILY, Lola Vega MD, 20 mg at 04/23/20 1102    dexamethasone (DECADRON) 4 mg/mL injection 4 mg, 4 mg, IntraVENous, Q6H, Sharath Ward MD, 4 mg at 04/23/20 1350    morphine injection 1.5 mg, 1.5 mg, IntraVENous, Q3H PRN, Mabel Nelson NP    naloxone (NARCAN) injection 0.4 mg, 0.4 mg, IntraVENous, EVERY 2 MINUTES AS NEEDED, Mabel Nelson NP    pantoprazole (PROTONIX) tablet 40 mg, 40 mg, Oral, ACB, Gisell Melton MD, 40 mg at 04/23/20 0730    0.9% sodium chloride infusion, 75 mL/hr, IntraVENous, CONTINUOUS, Lola Vega MD, Last Rate: 75 mL/hr at 04/22/20 1943, 75 mL/hr at 04/22/20 1943    tamsulosin (FLOMAX) capsule 0.4 mg, 0.4 mg, Oral, DAILY, Melodie Fagan MD, 0.4 mg at 04/23/20 9182    thiamine mononitrate (B-1) tablet 100 mg, 100 mg, Oral, DAILY, Pedro Alcantar MD, 100 mg at 04/23/20 0857    therapeutic multivitamin (THERAGRAN) tablet 1 Tab, 1 Tab, Oral, DAILY, Pedro Alcantar MD, 1 Tab at 04/23/20 0858    lactobac ac& pc-s.therm-b.anim (PATRIZIA Q/RISAQUAD), 1 Cap, Oral, DAILY, Pedro Alcantar MD, 1 Cap at 04/23/20 0857    metoprolol tartrate (LOPRESSOR) tablet 50 mg, 50 mg, Oral, BID, Tere Murillo NP, Stopped at 04/22/20 0812    metoprolol (LOPRESSOR) injection 2.5 mg, 2.5 mg, IntraVENous, Q6H PRN, Page Osborn MD    sodium chloride (NS) flush 5-40 mL, 5-40 mL, IntraVENous, Q8H, Vaishnavi Sainz MD, 10 mL at 04/23/20 1400    sodium chloride (NS) flush 5-40 mL, 5-40 mL, IntraVENous, PRN, Martin Elliott MD    cefTRIAXone (ROCEPHIN) 1 g in 0.9% sodium chloride (MBP/ADV) 50 mL, 1 g, IntraVENous, Q24H, Martin Elliott MD, Last Rate: 100 mL/hr at 04/22/20 2043, 1 g at 04/22/20 2043    HYDROcodone-acetaminophen (NORCO) 5-325 mg per tablet 1 Tab, 1 Tab, Oral, Q4H PRN, Martin Elliott MD, 1 Tab at 04/23/20 0857    docusate sodium (COLACE) capsule 100 mg, 100 mg, Oral, BID, Vaishnavi Sainz, MD, Stopped at 04/23/20 0900    heparin (porcine) injection 5,000 Units, 5,000 Units, SubCUTAneous, Q8H, Maricarmen Pate MD, 5,000 Units at 04/23/20 1350    prochlorperazine (COMPAZINE) with saline injection 5 mg, 5 mg, IntraVENous, Q8H PRN, Maricarmen Pate MD        No Known Allergies   MRI Results (most recent):  Results from [place] encounter on 04/17/20   MRI [facility] SPINE WO CONT    Narrative INDICATION:  metastatic cord compression     COMPARISON: CT April 17, 2020    TECHNIQUE: Multiplanar multisequence acquisition of the thoracic spine. CONTRAST:  None. FINDINGS:    Significant motion artifact. Normal alignment of the thoracic spine. Multiple  areas of abnormal marrow signal involving the T1, T5, T7, T8, T9 vertebral  bodies, as well as left T2 transverse process/second rib, probable T3 and T4  spinous processes and likely bilateral ribs. No evidence of acute or subacute  compression fracture however. Heterogeneous appearance of the thoracic spinal  cord, beginning at the level of approximately T3 and extending to the  thoracolumbar junction, with probable cord expansion and edema. IV contrast was  not administered. Enlarged thoracic lymph nodes, right hilar/lower lobe mass,  numerous hepatic metastases in left adrenal mass better seen on recent CT  examination. Impression IMPRESSION:    1. Severe motion artifact. 2. Heterogeneous appearance of the thoracic spinal cord from T3 to the  thoracolumbar junction as above, concerning for metastatic involvement which may  be leptomeningeal or intramedullary, particularly given the abnormal appearance  on lumbar spine MRI. 3. Osseous metastatic disease without compression fracture. Epidural tumor  extension cannot be completely excluded. 4. See recent CT chest abdomen pelvis findings regarding lung mass, adenopathy,  hepatic and adrenal metastases. Ordering physician notified via [employer] system upon interpretation.     23X Results from [facility] encounter on 04/17/20   MRI [facility] SPINE WO CONT    Narrative INDICATION:  metastatic cord compression     COMPARISON: CT April 17, 2020    TECHNIQUE: Multiplanar multisequence acquisition of the thoracic spine. CONTRAST:  None. FINDINGS:    Significant motion artifact. Normal alignment of the thoracic spine. Multiple  areas of abnormal marrow signal involving the T1, T5, T7, T8, T9 vertebral  bodies, as well as left T2 transverse process/second rib, probable T3 and T4  spinous processes and likely bilateral ribs. No evidence of acute or subacute  compression fracture however. Heterogeneous appearance of the thoracic spinal  cord, beginning at the level of approximately T3 and extending to the  thoracolumbar junction, with probable cord expansion and edema. IV contrast was  not administered. Enlarged thoracic lymph nodes, right hilar/lower lobe mass,  numerous hepatic metastases in left adrenal mass better seen on recent CT  examination. Impression IMPRESSION:    1. Severe motion artifact. 2. Heterogeneous appearance of the thoracic spinal cord from T3 to the  thoracolumbar junction as above, concerning for metastatic involvement which may  be leptomeningeal or intramedullary, particularly given the abnormal appearance  on lumbar spine MRI. 3. Osseous metastatic disease without compression fracture. Epidural tumor  extension cannot be completely excluded. 4. See recent CT chest abdomen pelvis findings regarding lung mass, adenopathy,  hepatic and adrenal metastases. Ordering physician notified via [employer] system upon interpretation.     23X       Review of Systems:  A comprehensive review of systems was negative except for: Constitutional: positive for fatigue and malaise  Respiratory: positive for pleurisy/chest pain, dyspnea on exertion, emphysema or chronic bronchitis  Gastrointestinal: positive for reflux symptoms, nausea, vomiting, change in bowel habits, constipation and abdominal pain  Musculoskeletal: positive for myalgias, arthralgias, stiff joints, back pain and muscle weakness  Neurological: positive for paresthesia, coordination problems, gait problems and weakness   Vitals:    04/23/20 0303 04/23/20 0426 04/23/20 0800 04/23/20 1537   BP: 98/67 106/70 99/61 101/78   Pulse: (!) 115  73 68   Resp: 17  18 18   Temp: 97.6 °F (36.4 °C)  98 °F (36.7 °C) 98.3 °F (36.8 °C)   SpO2: 98%  96% 99%   Weight:         Objective:     I      NEUROLOGICAL EXAM:    Appearance: The patient is poorly developed and nourished, provides a poor history and is in no acute distress. Mental Status: Oriented to time, place and person, and the president, cognitive function is slow but probably normal and speech is fluent and no aphasia or dysarthria. Mood and affect appropriate. Cranial Nerves:   Intact visual fields. Fundi are benign, disc are flat, no lesions seen on funduscopy. MAYR, EOM's full, no nystagmus, no ptosis. Facial sensation is normal. Corneal reflexes are not tested. Facial movement is symmetric. Hearing is abnormal bilaterally. Palate is midline with normal sternocleidomastoid and trapezius muscles are normal. Tongue is midline. Neck without meningismus or bruits  Temporal arteries are not tender or enlarged  TMJ areas are not tender on palpation   Motor:  4/5 strength in upper proximal and distal muscles. Patient has a flaccid paraplegia below his nipple line. Normal bulk and decreased muscle tone. No fasciculations. Rapid alternating movement is symmetric and intact in the upper extremities, but no movement in lower extremities   Reflexes:   Deep tendon reflexes 1+/4 in the upper extremities, and absent in both lower extremities. No babinski or clonus present   Sensory:   Abnormal to touch, pinprick and vibration and temperature from mid to lower chest down. DSS is intact in the upper extremities   Gait:  Not testable.    Tremor:   No tremor noted.   Cerebellar:  Not testable cerebellar signs present on Romberg and tandem testing and finger-nose-finger exam is unremarkable. Neurovascular:  Normal heart sounds and regular rhythm, peripheral pulses decreased, and no carotid bruits. 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 [facility]) (4/20/2020)      Metastatic carcinoma involving bone with unknown primary site [facility]) (4/22/2020)      Pain due to neoplasm (4/22/2020)      Neoplastic malignant related fatigue (4/22/2020)        Plan:     Patient has probably intramedullary metastatic disease throughout the thoracic spine with cord enlargement swelling, and diffuse meningeal enhancement 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 Neurosurgery says they can do nothing for the patient. Palliative care would be the best treatment for this unfortunate gentleman. Patient CT of the head was negative, he refused the MRI. We will reduce his steroids because of lack of efficacy showing no improvement at all,  Patient complaining of increasing pain will try gabapentin 600 mg 3 times a day  Difficult case and have discussed with the attendings, MRI scan, and other physicians. Patient high risk serious and progressive and permanent neurologic disability, dysfunction and possibly even death.   Prognosis very poor, we do not have tissue diagnosis yet,  Probably going to need palliative care or hospice  Will follow as needed for now, not much else to offer    Signed By: Hedy Daniel MD     April 23, 2020       CC: None  FAX: None

## 2020-04-23 NOTE — PROGRESS NOTES
Oncology End of Shift Note      Bedside shift change report given to CAM Londono (incoming nurse) by Isaura Cross (outgoing nurse) on Jani Grad. Report included the following information SBAR, Kardex and MAR. Shift Summary: Assumed patient care at 11pm. At [phone] patient had 28 beats of Vtach MD notified ordered mag lab. Patient drowsy but will wake up when staff is interacting with him, gave PRN pain meds. Issues for Physician to Address:       Patient on Cardiac Monitoring?  Yes  [x] Yes  [] No    Rhythm: Sinus tachy          Shift Events        Caterina Sainz

## 2020-04-23 NOTE — PROGRESS NOTES
OT note:  Approached pt for possible OT session. Pt refusing any activity today.  Will attempt tomorrow, but if pt still refusing and given medical issues, will sign off

## 2020-04-23 NOTE — PROGRESS NOTES
Consult dictated: Patient has a sensory level around T7-T8 just below the nipples bilaterally, this looks like cord compression from metastatic disease, ordering stat MRI and have talked MRI scan, started on high-dose Decadron and consulting neurosurgery, may need radiation therapy but we do not really have a tissue diagnosis yet, difficult case. Consult  REFERRED BY:  None    CHIEF COMPLAINT: 1 month history of weakness and numbness in his legs      Subjective: Efrem Leone is a 64 y.o. right-handed -American male we are asked to evaluate at the request of Dr. Jhon Chadwick of sudden leg weakness bilaterally and became complete early morning April 20, 2020.,  And patient MRI shows diffuse cord enlargement and meningeal enhancement throughout large portions of the thoracic spine, and diffuse bony metastatic disease throughout the spine, but no clear evidence of cord compression of the spinal canal.  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 Neurosurgery says they have nothing to offer. Patient on high-dose Decadron 20 mg every 6 hours and no improvement at all. I have asked radiation therapy to see the patient to consider if there is any therapy they can give, and they offered empiric palliative radiation but the patient refused. I have asked hematology to see the patient to see if there is any medicinal therapy available, and they will see the patient later today. I told the patient intrathecal therapy would be an option if he wanted it, but that would require either inserting a port into the CNS, repeated spinal taps, and he refused that. He refuses to get an MRI of the brain just to check to make sure there is no brain mets 2. We will get a CAT scan he will will do that. We cannot give him contrast.  Patient has made no improvement in his paraplegia and is unchanged.   The patient has lesions in his lung described as mediastinal, right hilar, and retroperitoneal lymphadenopathy. Right lower lobe mass is seen, interstitial infiltrate. Throughout the right upper lobe, pleural-based masses are also noted in the right upper lobe, he has hepatic metastases left adrenal masses marked distention of the bladder with soft tissue nodule involving the bladder and possible omental metastases. Patient is not a smoker. Patient has been complaining of back pain and gradual progressive weakness and numbness in his legs for the last month, having difficulty walking and having a drinks of around the last month. He denies any recent fall but did have an infection on admission. He is COVID negative. Patient is an ex-smoker. He denies any difficulty speaking swallowing or neck pain or headache or cranial nerve problems. He has difficulty with bowel or bladder control and movements. He has a markedly distended bladder on his CT scans. Patient has a sensory level around T7-T8 on neurologic examination complete flaccid paraplegia from the lower levels down. He has no difficulty with weakness or numbness in his arms. We will reduce his steroids because of no benefit to prevent the metabolic complications  Past Medical History:   Diagnosis Date    Hypotension     Vertigo       History reviewed. No pertinent surgical history. Family History   Problem Relation Age of Onset    Heart Disease Mother     Other Father         Unknown    Other Child         Gunshot wounds      Social History     Tobacco Use    Smoking status: Former Smoker    Smokeless tobacco: Never Used   Substance Use Topics    Alcohol use: Yes     Comment: 2-3 cans of beer a day.           Current Facility-Administered Medications:     dexamethasone (DECADRON) 4 mg/mL injection 4 mg, 4 mg, IntraVENous, Q6H, Tad Lopez MD, 4 mg at 04/22/20 1829    gabapentin (NEURONTIN) capsule 300 mg, 300 mg, Oral, TID, Sarahi Nelson NP, 300 mg at 04/22/20 2125    morphine injection 1.5 mg, 1.5 mg, IntraVENous, Q3H PRN, Zoya Nelson NP    naloxone (NARCAN) injection 0.4 mg, 0.4 mg, IntraVENous, EVERY 2 MINUTES AS NEEDED, Zoya Nelson NP    pantoprazole (PROTONIX) tablet 40 mg, 40 mg, Oral, ACB, Gisell Metlon MD, 40 mg at 04/22/20 9871    0.9% sodium chloride infusion, 75 mL/hr, IntraVENous, CONTINUOUS, Himanshu Hastings MD, Last Rate: 75 mL/hr at 04/22/20 1943, 75 mL/hr at 04/22/20 1943    tamsulosin (FLOMAX) capsule 0.4 mg, 0.4 mg, Oral, DAILY, Alexandra Handy MD, 0.4 mg at 04/22/20 2438    thiamine mononitrate (B-1) tablet 100 mg, 100 mg, Oral, DAILY, Aonop Bruce MD, 100 mg at 04/22/20 2395    therapeutic multivitamin (THERAGRAN) tablet 1 Tab, 1 Tab, Oral, DAILY, Anoop Bruce MD, 1 Tab at 04/22/20 5433    lactobac ac& pc-s.therm-b.anim (PATRIZIA Q/RISAQUAD), 1 Cap, Oral, DAILY, Anoop Bruce MD, 1 Cap at 04/22/20 0255    metoprolol tartrate (LOPRESSOR) tablet 50 mg, 50 mg, Oral, BID, Tere Murillo NP, Stopped at 04/22/20 0812    metoprolol (LOPRESSOR) injection 2.5 mg, 2.5 mg, IntraVENous, Q6H PRN, Jose Manuel Quevedo MD    sodium chloride (NS) flush 5-40 mL, 5-40 mL, IntraVENous, Q8H, Vaishnavi Bergman MD, 10 mL at 04/22/20 2125    sodium chloride (NS) flush 5-40 mL, 5-40 mL, IntraVENous, PRN, Luis Garcia MD    cefTRIAXone (ROCEPHIN) 1 g in 0.9% sodium chloride (MBP/ADV) 50 mL, 1 g, IntraVENous, Q24H, Luis Garcia MD, Last Rate: 100 mL/hr at 04/22/20 2043, 1 g at 04/22/20 2043    HYDROcodone-acetaminophen (NORCO) 5-325 mg per tablet 1 Tab, 1 Tab, Oral, Q4H PRN, Luis Garcia MD, 1 Tab at 04/22/20 1943    docusate sodium (COLACE) capsule 100 mg, 100 mg, Oral, BID, Luis Garcia MD, 100 mg at 04/22/20 1829    heparin (porcine) injection 5,000 Units, 5,000 Units, SubCUTAneous, Q8H, Luis Garcia MD, 5,000 Units at 04/22/20 2047    prochlorperazine (COMPAZINE) with saline injection 5 mg, 5 mg, IntraVENous, Q8H PRN, Luis Garcia MD        No Known Allergies MRI Results (most recent):  Results from [place] encounter on 04/17/20   MRI [facility] SPINE WO CONT    Narrative INDICATION:  metastatic cord compression     COMPARISON: CT April 17, 2020    TECHNIQUE: Multiplanar multisequence acquisition of the thoracic spine. CONTRAST:  None. FINDINGS:    Significant motion artifact. Normal alignment of the thoracic spine. Multiple  areas of abnormal marrow signal involving the T1, T5, T7, T8, T9 vertebral  bodies, as well as left T2 transverse process/second rib, probable T3 and T4  spinous processes and likely bilateral ribs. No evidence of acute or subacute  compression fracture however. Heterogeneous appearance of the thoracic spinal  cord, beginning at the level of approximately T3 and extending to the  thoracolumbar junction, with probable cord expansion and edema. IV contrast was  not administered. Enlarged thoracic lymph nodes, right hilar/lower lobe mass,  numerous hepatic metastases in left adrenal mass better seen on recent CT  examination. Impression IMPRESSION:    1. Severe motion artifact. 2. Heterogeneous appearance of the thoracic spinal cord from T3 to the  thoracolumbar junction as above, concerning for metastatic involvement which may  be leptomeningeal or intramedullary, particularly given the abnormal appearance  on lumbar spine MRI. 3. Osseous metastatic disease without compression fracture. Epidural tumor  extension cannot be completely excluded. 4. See recent CT chest abdomen pelvis findings regarding lung mass, adenopathy,  hepatic and adrenal metastases. Ordering physician notified via [employer] system upon interpretation. 23X         Results from [place] encounter on 04/17/20   MRI [facility] SPINE WO CONT    Narrative INDICATION:  metastatic cord compression     COMPARISON: CT April 17, 2020    TECHNIQUE: Multiplanar multisequence acquisition of the thoracic spine.      CONTRAST: None.    FINDINGS:    Significant motion artifact. Normal alignment of the thoracic spine. Multiple  areas of abnormal marrow signal involving the T1, T5, T7, T8, T9 vertebral  bodies, as well as left T2 transverse process/second rib, probable T3 and T4  spinous processes and likely bilateral ribs. No evidence of acute or subacute  compression fracture however. Heterogeneous appearance of the thoracic spinal  cord, beginning at the level of approximately T3 and extending to the  thoracolumbar junction, with probable cord expansion and edema. IV contrast was  not administered. Enlarged thoracic lymph nodes, right hilar/lower lobe mass,  numerous hepatic metastases in left adrenal mass better seen on recent CT  examination. Impression IMPRESSION:    1. Severe motion artifact. 2. Heterogeneous appearance of the thoracic spinal cord from T3 to the  thoracolumbar junction as above, concerning for metastatic involvement which may  be leptomeningeal or intramedullary, particularly given the abnormal appearance  on lumbar spine MRI. 3. Osseous metastatic disease without compression fracture. Epidural tumor  extension cannot be completely excluded. 4. See recent CT chest abdomen pelvis findings regarding lung mass, adenopathy,  hepatic and adrenal metastases. Ordering physician notified via [employer] system upon interpretation.     23X       Review of Systems:  A comprehensive review of systems was negative except for: Constitutional: positive for fatigue and malaise  Respiratory: positive for pleurisy/chest pain, dyspnea on exertion, emphysema or chronic bronchitis  Gastrointestinal: positive for reflux symptoms, nausea, vomiting, change in bowel habits, constipation and abdominal pain  Musculoskeletal: positive for myalgias, arthralgias, stiff joints, back pain and muscle weakness  Neurological: positive for paresthesia, coordination problems, gait problems and weakness   Vitals:    04/22/20 0800 04/22/20 1145 04/22/20 1534 04/22/20 1946   BP: 99/63 108/68 92/66 102/68   Pulse: 97 96 (!) 113 (!) 114   Resp: 18 16 18 17   Temp: 97.5 °F (36.4 °C) 97.9 °F (36.6 °C) 97.9 °F (36.6 °C) 97.6 °F (36.4 °C)   SpO2: 95% 90% 98% 94%   Weight:         Objective:     I      NEUROLOGICAL EXAM:    Appearance: The patient is poorly developed and nourished, provides a poor history and is in no acute distress. Mental Status: Oriented to time, place and person, and the president, cognitive function is slow but probably normal and speech is fluent and no aphasia or dysarthria. Mood and affect appropriate. Cranial Nerves:   Intact visual fields. Fundi are benign, disc are flat, no lesions seen on funduscopy. MARY, EOM's full, no nystagmus, no ptosis. Facial sensation is normal. Corneal reflexes are not tested. Facial movement is symmetric. Hearing is abnormal bilaterally. Palate is midline with normal sternocleidomastoid and trapezius muscles are normal. Tongue is midline. Neck without meningismus or bruits  Temporal arteries are not tender or enlarged  TMJ areas are not tender on palpation   Motor:  4/5 strength in upper proximal and distal muscles. Patient has a flaccid paraplegia below his nipple line. Normal bulk and decreased muscle tone. No fasciculations. Rapid alternating movement is symmetric and intact in the upper extremities, but no movement in lower extremities   Reflexes:   Deep tendon reflexes 1+/4 in the upper extremities, and absent in both lower extremities. No babinski or clonus present   Sensory:   Abnormal to touch, pinprick and vibration and temperature from mid to lower chest down. DSS is intact in the upper extremities   Gait:  Not testable. Tremor:   No tremor noted. Cerebellar:  Not testable cerebellar signs present on Romberg and tandem testing and finger-nose-finger exam is unremarkable.    Neurovascular:  Normal heart sounds and regular rhythm, peripheral pulses decreased, and no carotid bruits. 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 [facility]) (4/20/2020)      Metastatic carcinoma involving bone with unknown primary site [facility]) (4/22/2020)      Pain due to neoplasm (4/22/2020)      Neoplastic malignant related fatigue (4/22/2020)        Plan:     Patient has probably intramedullary metastatic disease throughout the thoracic spine with cord enlargement swelling, and diffuse meningeal enhancement 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 Neurosurgery says they can do nothing for the patient. Palliative care would be the best treatment for this unfortunate gentleman. Patient CT of the head was negative, he refused the MRI. We will reduce his steroids because of lack of efficacy showing no improvement at all,  Patient complaining of increasing pain will try gabapentin 300 mg 3 times a day  Difficult case and have discussed with the attendings, MRI scan, and other physicians. Patient high risk serious and progressive and permanent neurologic disability, dysfunction and possibly even death.   Prognosis very poor, we do not have tissue diagnosis yet,  Probably going to need palliative care or hospice  Signed By: Tato Howard MD     April 22, 2020       CC: None  FAX: None

## 2020-04-23 NOTE — PROGRESS NOTES
Problem: Falls - Risk of  Goal: *Absence of Falls  Description: Document [place] Flow Fall Risk and appropriate interventions in the flowsheet. Outcome: Progressing Towards Goal  Note: Fall Risk Interventions:  Mobility Interventions: Bed/chair exit alarm, Communicate number of staff needed for ambulation/transfer         Medication Interventions: Bed/chair exit alarm, Evaluate medications/consider consulting pharmacy    Elimination Interventions: Call light in reach, Bed/chair exit alarm    History of Falls Interventions: Bed/chair exit alarm, Door open when patient unattended         Problem: Breathing Pattern - Ineffective  Goal: *Absence of hypoxia  Outcome: Progressing Towards Goal  Goal: *Use of effective breathing techniques  Outcome: Progressing Towards Goal     Problem: Infection - Risk of, Urinary Catheter-Associated Urinary Tract Infection  Goal: *Absence of infection signs and symptoms  Outcome: Progressing Towards Goal     Problem: Patient Education: Go to Patient Education Activity  Goal: Patient/Family Education  Outcome: Progressing Towards Goal     Problem: Pressure Injury - Risk of  Goal: *Prevention of pressure injury  Description: Document Marco Antonio Scale and appropriate interventions in the flowsheet.   Outcome: Progressing Towards Goal  Note: Pressure Injury Interventions:  Sensory Interventions: Assess changes in LOC, Keep linens dry and wrinkle-free    Moisture Interventions: Absorbent underpads, Apply protective barrier, creams and emollients    Activity Interventions: Pressure redistribution bed/mattress(bed type)    Mobility Interventions: Pressure redistribution bed/mattress (bed type), HOB 30 degrees or less    Nutrition Interventions: Document food/fluid/supplement intake    Friction and Shear Interventions: HOB 30 degrees or less

## 2020-04-23 NOTE — PROGRESS NOTES
I reviewed pertinent labs and imaging, and discussed /agreed on the plan of care with Dr. Miladys Mcclendon. Hospitalist Progress Note    NAME: Efrem Leone   :  1960   MRN:  [id number]     Assessment / Plan:  New onset paraplegia around T7-T8  Metastatic disease to liver, lungs, bone adrenals, and retroperitoneal LNs  · Poor prognosis   · Widespread metastatic diease found during this admission   · Multiple spinal metastasis. New onset bilateral lower extremity paraplegia. Neurologic exam sensory level T7-T8  · MRI lumbar spine:  Imaging findings consistent with widespread osseous metastatic disease. Largest lesion at L4 and in the left iliac bone with large lesions at S1 and S2 as well. No associated acute/chronic compression deformity. No canal compromise. Atypical cord signal intensity at the L2 level. Metastatic focus not excluded. Possible nodular densities associated with cauda equina nerve roots. Intrathecal metastatic disease is not excluded. The patient was not administered IV contrast. Retroperitoneal adenopathy and hepatic masses. · MRI thorac spine: 1. Severe motion artifact. 2. Heterogeneous appearance of the thoracic spinal cord from T3 to the thoracolumbar junction as above, concerning for metastatic involvement which may be leptomeningeal or intramedullary, particularly given the abnormal appearance on lumbar spine MRI. 3. Osseous metastatic disease without compression fracture. Epidural tumor extension cannot be completely excluded. 4. See recent CT chest abdomen pelvis findings regarding lung mass, adenopathy, hepatic and adrenal metastases. · MRI cervical spine:  1. Significant technical artifact due to motion as above. 2. Osseous metastasis T1 vertebral body. No acute or subacute fracture. No abnormality involving the cervical spinal cord allowing for motion. See separate thoracic MRI report regarding cord abnormality in the upper thoracic region.   3. Partly visualized upper mediastinal and supraclavicular lymphadenopathy. · Decadron discontinued d/t ineffectiveness   · Neurosurgery states there is nothing they are able to do for patient   · Appreciate palliative/hospice input   · Appreciate oncology input   · Appreciate neurology input   · CT head negative  · Patient refused MRI of brain and LP  · Patient is not interested in radiation     Acute hypoxic respiratory failure secondary to   Pneumonia   Possible COPD  Tobacco abuse disorder   · COVID-19 ruled out   · Wean O2 for sats >93%  · CXR:  Stable right infrahilar opacity is concerning for underlying neoplasm. Potential right paratracheal lymphadenopathy. Correlation with chest CT is recommended following resolution of the COVID 19 status. · CT chest:  Mediastinal, right hilar, and retroperitoneal lymphadenopathy. Right lower lobe mass lesion as well as postobstructive consolidation. Interstitial infiltrate is noted throughout the right upper lobe. Pleural-based mass lesions throughout the right upper lobe. Hepatic metastases. Left adrenal masses concerning for metastatic disease. Marked distention of the bladder with soft tissue nodule involving the left bladder wall may represent an omental metastasis. · BC NGTD  · Procalcitonin 40.19  · Continue IV Rocephin and PO doxycycline   · Appreciate pulmonary input     GUANAKO on CKD IV  · Cr 2.97; improved from 3.80 on admission   · Maintain rodriguez d/t urinary retention  · Continue Flomax  · Continue IVF   · Avoid nephrotoxins  · Appreciate nephrology input     Acute pancreatitis  Elevated LFTs  Hepatomegaly  · Lipase 1,711; improved   · , , ; all increasing   · CT abd/pel:  Mediastinal, right hilar, and retroperitoneal lymphadenopathy. Right lower lobe mass lesion as well as postobstructive consolidation. Interstitial infiltrate is noted throughout the right upper lobe. Pleural-based mass lesions throughout the right upper lobe. Hepatic metastases.  Left adrenal masses concerning for metastatic disease. Marked distention of the bladder with soft tissue nodule involving the left bladder wall may represent an omental metastasis. · Appreciate GI input   · Abd US:  1. Diffuse hepatic metastases. 2.  No intrahepatic biliary dilation. 3.  Dilated common bile duct measuring 9 mm. 4.  Normal gallbladder    Chronic systolic heart failure, diagnosed 02/2020  Secondary hyperaldosteronism d/t HF  Elevated troponin   Hx of SVT  · Troponin 0.35->0.33->0.31->0.29  · ECHO with EF 25-30% (02/2020)  · Continue BB     18.5 - 24.9 Normal weight / Body mass index is 20.23 kg/m². Code status: Full  Prophylaxis: Hep SQ  Recommended Disposition: TBD- recommend transitioning to hospice care      Subjective:     Chief Complaint / Reason for Physician Visit  Follow-up paraplegia. Patient states he still doesn't know how he would like to proceed going forward. He states it is a lot of information to take in. Discussed with RN events overnight. Review of Systems:  Symptom Y/N Comments  Symptom Y/N Comments   Fever/Chills n   Chest Pain n    Poor Appetite n   Edema     Cough    Abdominal Pain     Sputum    Joint Pain     SOB/GRIFFIN n   Pruritis/Rash     Nausea/vomit    Tolerating PT/OT     Diarrhea    Tolerating Diet y    Constipation    Other       Could NOT obtain due to:      Objective:     VITALS:   Last 24hrs VS reviewed since prior progress note.  Most recent are:  Patient Vitals for the past 24 hrs:   Temp Pulse Resp BP SpO2   04/23/20 0800 98 °F (36.7 °C) 73 18 99/61 96 %   04/23/20 0426    106/70    04/23/20 0303 97.6 °F (36.4 °C) (!) 115 17 98/67 98 %   04/22/20 2329 97.9 °F (36.6 °C) (!) 118 17 98/58 95 %   04/22/20 1946 97.6 °F (36.4 °C) (!) 114 17 102/68 94 %   04/22/20 1534 97.9 °F (36.6 °C) (!) 113 18 92/66 98 %   04/22/20 1145 97.9 °F (36.6 °C) 96 16 108/68 90 %       Intake/Output Summary (Last 24 hours) at 4/23/2020 1057  Last data filed at 4/23/2020 0430  Gross per 24 hour   Intake  Output 950 ml   Net -950 ml        PHYSICAL EXAM:  General: Chronically ill-appearing frail AA male. NAD   EENT:  EOMI. Anicteric sclerae. MMM  Resp:  Lung sounds clear, no wheezing or rales. No accessory muscle use  CV:  Regular rate rhythm, No edema  GI:  Firm,  +Bowel sounds, hepatomegaly   Neurologic:  A&O, paraplegia to BLE  Psych:   Fair insight. Not anxious nor agitated  Skin:  No rashes. No jaundice    Reviewed most current lab test results and cultures  YES  Reviewed most current radiology test results   YES  Review and summation of old records today    NO  Reviewed patient's current orders and MAR    YES  PMH/SH reviewed - no change compared to H&P  ________________________________________________________________________  Care Plan discussed with:    Comments   Patient x    Family      RN x    Care Manager x    Consultant  x                      Multidiciplinary team rounds were held today with , nursing, pharmacist and clinical coordinator. Patient's plan of care was discussed; medications were reviewed and discharge planning was addressed. ________________________________________________________________________  Total NON critical care TIME:  25   Minutes    Total CRITICAL CARE TIME Spent:   Minutes non procedure based      Comments   >50% of visit spent in counseling and coordination of care     ________________________________________________________________________  Megha Fragoso NP     Procedures: see electronic medical records for all procedures/Xrays and details which were not copied into this note but were reviewed prior to creation of Plan. LABS:  I reviewed today's most current labs and imaging studies.   Pertinent labs include:  Recent Labs     04/23/20  0513 04/22/20  0239 04/21/20  0236   WBC 14.2* 13.7* 7.5   HGB 13.1 12.6 13.3   HCT 39.5 36.8 39.7    170 156     Recent Labs     04/23/20  0513 04/22/20  1821 04/22/20  0239 04/21/20  0236     -- 139 137   K 5.7* 5.1 4.9 5.4*     --  104 103   CO2 22  --  25 24   *  --  133* 123*   *  --  96* 85*   CREA 2.97*  --  2.59* 2.76*   CA 7.2*  --  7.4* 7.9*   MG 2.5* 2.6*  --   --    ALB  --   --  2.3*  --    TBILI  --   --  0.9  --    SGOT  --   --  535*  --    ALT  --   --  295*  --        Signed: Addis Schwartz, NP

## 2020-04-23 NOTE — PROGRESS NOTES
Palliative Medicine  [place]: [phone] ([phone]  [facility]: [phone])    [facility] by to see patient to continue to provide emotional support and to discuss his understanding of his illness and prognosis. He was laying flat on his back, eyes closed, his lunch tray was on the bedside table, most of the food was not eaten. When asked if he ate lunch, he noted \"yeah, I ate some\". Patient did not open his eyes at all, asked LCSW to \"come back on the weekend\". He was not wanting to talk at this time. LCSW let him know I would try again this afternoon to see him. Chart reviewed, patient refused OT and PT again. He does not seem interested or motivated to try. Will see if he is able to talk about this.     4:00 pm Patient awake now, still prefers to lay flat on his back as that is the most comfortable position for him, c/o some burning in his back at this time, encouraged him to call RN for medication, which he did. Patient requested ice for his water and pepsi. Brought him ice, also asked about his mostly unfinished lunch, he noted he did not like what was on the tray. Let patient know that there is a menu that he can order from, gave him a menu. Patient noted, \"I'm being discharged, they said I am ready to leave\". He does not know where he will be going, indicated that it is likely a facility, when LCSW asked him. Patient still processing all his medical information, \"there's a lot they throw at you\". He does realize that he has cancer, explained that this is the primary source of his paralysis, current challenges. He denied any fear, anger, questioning of his pablo, noting, \"we've all got to die of something\". Empathized with him, shared that for him, at his age, death is not always a natural occurrence. He was quiet for a while, does not show any depression or sadness.  Patient shared that the biggest loss for him right now is loss of mobility, \"if I could get up and walk, everything would be fine\". Allowed patient to express his loss and feelings around this. Explained to patient that it is important to think about his care goals and wishes, especially if he would ever want to return to the hospital or focus on his comfort. He did note, \"keep me alive\". Explained to patient that we can do this and more, but he likely would not have quality of life. Asked patient if he would ever want intubation especially in light of his Lung Cancer. Patient has not thought about this. He wants some time to think about it, he did become a little irritable, noting that \"everyone keeps asking me the same questions again and again\". I let him know that especially since we don't have any family members to contact, we need to know his wishes. Let patient know that he may want to think about this overnight and that I would return tomorrow to discuss his wishes. If possible, will complete POST form with patient, so his wishes can be known. At this time, patient is not ready for hospice/ comfort only.

## 2020-04-23 NOTE — PROGRESS NOTES
CARMELA:   1) LTC with or without hospice   2) Pt will need a UAI completed prior to d/c     4:26 PM- CM informed by Palliative SW that pt has decided not to go with hospice but is willing to do LTC. Palliative plans to meet with pt in the morning to complete a POST form. [address] [place] [address],[address] and [facility], waiting on responses from other LTCs. 3:25 PM- CM informed by attending pt is ready to d/c and is refusing to speak with staff regarding disposition. CM met with pt in room and discussed the need to talk the next steps as he is medically stable to d/c with anticipation for tomorrow 4/24/2020. CM acknowledge pt's feelings and frustration and explained the benefits of going to LTC. CM also explained what hospice could provide to him. Pt agreeable for CM to send out multiple referrals for LTC. Pt states no preference. CM inquired about family in the area that he may want to be near, pt declined stating he would find them. Referrals sent to [address] [place] [address],[address], [facility], [facility]- waiting on response.      Rizwan Carney, QUINTIN, [facility]   [phone]

## 2020-04-23 NOTE — PROGRESS NOTES
Palliative Medicine Consult    Patient Name: Genny Valerio  YOB: 1960    Date of Initial Consult: 4/21/2020  Reason for Consult: Care Decisions  Requesting Provider: Judah Alvarez NP   Primary Care Physician: None      SUMMARY:   Genny Valerio is a 64 y.o. with newly-diagnosed widely metastatic disease, new onset paraplegia, CKD, systolic CHF who was admitted on 4/17/2020 with a diagnosis of AHFR, acute pancreatitis, elevated LFTs, elevated troponin, GUANAKO on CKD. On further evaluation he was found to have newly-diagnosed widely metastatic disease to the lungs, liver, adrenals, retroperitoneal LNs, and diffuse bony and intrathecal mets for which acute neurosurgical intervention was not indicated. He has also been evaluated by Neurology for new-onset paraplegic around T7-T8, for which he is currently receiving decadron for possible cord compression from metastatic disease. He has also been evaluated by Radiation Oncology, who indicated that radiation intervention is unlikely to provide neurologic benefit. He has been evaluated by GI for pancreatitis. Given the extent of widespread disease and overall poor prognosis, Oncology has recommended transitioning to hospice. Current medical issues leading to Palliative Medicine involvement include: Care decisions. PALLIATIVE DIAGNOSES:   1. Newly-diagnosed widely metastatic disease, likely lung primary per Oncology   2. New-onset paraplegia around T7-T8  3. CKD  4. Systolic CHF  5. Pancreatitis  6. Pain related to metastasis  7. Urinary retention  8. Fatigue due to neoplasm  9. Decreased appetite  10. Goals of care      PLAN:   1. Pain:  Pt minimally conversant, ie, answered my questions briefly with eyes closed, then stopped as if he fell asleep with his eyes rolling slightly back. He states his pain is 6/10.   Chart review shows pt has received 2 doses of Norco 5mg yesterday and 1 dose of 1mg IV morphine, and 2 doses of Norco 5mg today.    -continue 5mg Norco q. 4 hours prn.  2. Appetite: not eating much, states he feels too full all the time, is having BMs. abd distended. 3. Goals of care: pt is avoiding having to talk about goals of care. Given that he found out about his cancer just under a week ago, I doubt he will be able to make a decision about his code status. He is refusing some treatments and tests offered (XRT, MRI, PT/OT) so I suggest we focus on talking about his discharge plan and I have asked our  Christina Kumar to address this if he will engage; floor nurses are telling me he was talking about getting a wheelchair and going back out on the street, hopefully we can help him see that that plan would fail and the only plan that's plausible is to transfer to a SNF. 4. Patient's care discussed with bedside nurse. GOALS OF CARE / TREATMENT PREFERENCES:   [====Goals of Care====]  GOALS OF CARE:  Patient/Health Care Proxy Stated Goals: Prolong life      TREATMENT PREFERENCES:   Code Status: Full Code    Advance Care Planning:  Advance Care Planning 4/17/2020   Patient's Healthcare Decision Maker is: Verbal statement (Legal Next of Kin remains as decision maker)   Confirm Advance Directive None   Patient Would Like to Complete Advance Directive No                 The palliative care team has discussed with patient / health care proxy about goals of care / treatment preferences for patient.  [====Goals of Care====]         HISTORY:     History obtained from: chart review, palliative IDT, bedside nurse, patient    CHIEF COMPLAINT: tired    HPI/SUBJECTIVE:    The patient is:   [x] Verbal and participatory  [] Non-participatory due to:     Patient says he cannot remember everything he has been told during this hospitalization but that he knows he is not doing well. \"Just keep me alive,\" he says at one point. We did discuss that his cancer is not curable.  I introduced the concept of hospice as specialized care for those with serious illness who wish to focus on comfort and quality of life at the end of life (rather than pursuing tests, procedures, hospitalizations, etc.). After I introduced hospice he asked, Sulaiman Garcia we talk about his later? \" He asked to not continue further discussion, and so I was unable to complete full ROS due to this. Patient states he has one son Gui Larson and two grandchildren. He indicates that it would be okay to talk to his son about care decisions but that he would have to look for his son's number and does not know where his son is at any given moment since he is a . Clinical Pain Assessment (nonverbal scale for severity on nonverbal patients):   Clinical Pain Assessment  Severity: 8  Location: back radiating down leg, neck radiating down both arms, across chest and abdomen  Character: shooting, burning, deep and dull  Duration: month  Effect: cannot sit up  Factors: movement  Frequency: constant    Adult Nonverbal Pain Scale  Face: Occasional grimace, tearing, frowning, wrinkled forehead  Activity (Movement): Restless, excessive activity and/or withdrawal reflexes  Guarding: Rigid, stiff  Physiology (Vital Signs):  Stable vital signs  Respiratory: Baseline RR/SpO2 compliant with ventilator  Total Score: 5       FUNCTIONAL ASSESSMENT:     Palliative Performance Scale (PPS):  PPS: 20       PSYCHOSOCIAL/SPIRITUAL SCREENING:     Advance Care Planning:  Advance Care Planning 4/17/2020   Patient's Healthcare Decision Maker is: Verbal statement (Legal Next of Kin remains as decision maker)   Confirm Advance Directive None   Patient Would Like to Complete Advance Directive No        Any spiritual / Voodoo concerns:  [] Yes /  [] No    Caregiver Burnout:  [] Yes /  [] No /  [x] No Caregiver Present      Anticipatory grief assessment:   [] Normal  / [x] Maladaptive       ESAS Anxiety: Anxiety: 0    ESAS Depression: Depression: 5        REVIEW OF SYSTEMS:     Positive and pertinent negative findings in ROS are noted above in HPI.  The following systems were [x] reviewed / [] unable to be reviewed as noted in HPI  Other findings are noted below. Systems: constitutional, ears/nose/mouth/throat, respiratory, gastrointestinal, genitourinary, musculoskeletal, integumentary, neurologic, psychiatric, endocrine. Positive findings noted below. Modified ESAS Completed by: provider   Fatigue: 7 Drowsiness: 2   Depression: 5 Pain: 8   Anxiety: 0 Nausea: 0   Anorexia: 8 Dyspnea: 0     Constipation: No     Stool Occurrence(s): 2        PHYSICAL EXAM:     From RN flowsheet:  Wt Readings from Last 3 Encounters:   04/19/20 137 lb (62.1 kg)   04/20/20 136 lb 14.5 oz (62.1 kg)   04/17/20 150 lb (68 kg)     Blood pressure 99/61, pulse 73, temperature 98 °F (36.7 °C), resp. rate 18, weight 137 lb (62.1 kg), SpO2 96 %.     Pain Scale 1: Numeric (0 - 10)  Pain Intensity 1: 2  Pain Onset 1: weeks  Pain Location 1: Head, Neck  Pain Orientation 1: Upper  Pain Description 1: Aching  Pain Intervention(s) 1: Medication (see MAR), Relaxation technique, Repositioned, Rest  Last bowel movement, if known:     Constitutional: thin, chroically ill appearing, lying abed with eyes closed resting, fatigued  Eyes: pupils equal, lids normal, no drainage  ENMT: no nasal discharge, moist mucous membranes  Cardiovascular: tachy, irreg  Respiratory: breathing not labored, normal rate, no audible wheezing  Gastrointestinal: distended, tender to deep palpation, +BS  Musculoskeletal: +generalized weakness, unable to move legs or feet  Skin: appears dry without apparent rash of face or arms  Neurologic: alert, answers direct questions  Psychiatric: blunted affect, answers questions appropriately     HISTORY:     Active Problems:    Acute respiratory failure with hypoxia ([address] (4/17/2020)      Paraplegia, complete ([address] (4/20/2020)      Spinal cord compression due to malignant neoplasm metastatic to spine ([address] (4/20/2020)      Metastatic carcinoma involving bone with unknown primary site [facility]) (4/22/2020)      Pain due to neoplasm (4/22/2020)      Neoplastic malignant related fatigue (4/22/2020)      Past Medical History:   Diagnosis Date    Hypotension     Vertigo       History reviewed. No pertinent surgical history. Family History   Problem Relation Age of Onset    Heart Disease Mother     Other Father         Unknown    Other Child         Gunshot wounds      History reviewed, no pertinent family history. Social History     Tobacco Use    Smoking status: Former Smoker    Smokeless tobacco: Never Used   Substance Use Topics    Alcohol use: Yes     Comment: 2-3 cans of beer a day.       No Known Allergies   Current Facility-Administered Medications   Medication Dose Route Frequency    gabapentin (NEURONTIN) capsule 300 mg  300 mg Oral BID    furosemide (LASIX) tablet 20 mg  20 mg Oral DAILY    dexamethasone (DECADRON) 4 mg/mL injection 4 mg  4 mg IntraVENous Q6H    morphine injection 1.5 mg  1.5 mg IntraVENous Q3H PRN    naloxone (NARCAN) injection 0.4 mg  0.4 mg IntraVENous EVERY 2 MINUTES AS NEEDED    pantoprazole (PROTONIX) tablet 40 mg  40 mg Oral ACB    0.9% sodium chloride infusion  75 mL/hr IntraVENous CONTINUOUS    tamsulosin (FLOMAX) capsule 0.4 mg  0.4 mg Oral DAILY    thiamine mononitrate (B-1) tablet 100 mg  100 mg Oral DAILY    therapeutic multivitamin (THERAGRAN) tablet 1 Tab  1 Tab Oral DAILY    lactobac ac& pc-s.therm-b.anim (PATRIZIA Q/RISAQUAD)  1 Cap Oral DAILY    metoprolol tartrate (LOPRESSOR) tablet 50 mg  50 mg Oral BID    metoprolol (LOPRESSOR) injection 2.5 mg  2.5 mg IntraVENous Q6H PRN    sodium chloride (NS) flush 5-40 mL  5-40 mL IntraVENous Q8H    sodium chloride (NS) flush 5-40 mL  5-40 mL IntraVENous PRN    cefTRIAXone (ROCEPHIN) 1 g in 0.9% sodium chloride (MBP/ADV) 50 mL  1 g IntraVENous Q24H    HYDROcodone-acetaminophen (NORCO) 5-325 mg per tablet 1 Tab  1 Tab Oral Q4H PRN    docusate sodium (COLACE) capsule 100 mg  100 mg Oral BID  heparin (porcine) injection 5,000 Units  5,000 Units SubCUTAneous Q8H    prochlorperazine (COMPAZINE) with saline injection 5 mg  5 mg IntraVENous Q8H PRN          LAB AND IMAGING FINDINGS:     Lab Results   Component Value Date/Time    WBC 14.2 (H) 04/23/2020 05:13 AM    HGB 13.1 04/23/2020 05:13 AM    PLATELET 913 68/13/2312 05:13 AM     Lab Results   Component Value Date/Time    Sodium 140 04/23/2020 05:13 AM    Potassium 5.7 (H) 04/23/2020 05:13 AM    Chloride 107 04/23/2020 05:13 AM    CO2 22 04/23/2020 05:13 AM     (H) 04/23/2020 05:13 AM    Creatinine 2.97 (H) 04/23/2020 05:13 AM    Calcium 7.2 (L) 04/23/2020 05:13 AM    Magnesium 2.5 (H) 04/23/2020 05:13 AM    Phosphorus 3.5 04/20/2020 04:22 AM      Lab Results   Component Value Date/Time    AST (SGOT) 535 (H) 04/22/2020 02:39 AM    Alk. phosphatase 686 (H) 04/22/2020 02:39 AM    Protein, total 6.0 (L) 04/22/2020 02:39 AM    Albumin 2.3 (L) 04/22/2020 02:39 AM    Globulin 3.7 04/22/2020 02:39 AM     No results found for: INR, PTMR, PTP, PT1, PT2, APTT, INREXT, INREXT   Lab Results   Component Value Date/Time    Iron 62 04/19/2020 03:29 AM    TIBC 183 (L) 04/19/2020 03:29 AM    Iron % saturation 34 04/19/2020 03:29 AM    Ferritin 1,043 (H) 04/19/2020 03:29 AM      No results found for: PH, PCO2, PO2  No components found for: [place]   Lab Results   Component Value Date/Time     (H) 04/17/2020 04:32 PM    CK - MB 4.2 (H) 04/17/2020 04:32 PM                Total time: 40  Counseling / coordination time, spent as noted above: 35  > 50% counseling / coordination?: y    Prolonged service was provided for  []30 min   []75 min in face to face time in the presence of the patient, spent as noted above. Time Start:   Time End:   Note: this can only be billed with 54860 (initial) or 30411 (follow up). If multiple start / stop times, list each separately.

## 2020-04-23 NOTE — PROGRESS NOTES
Bedside and Verbal shift change report given to [facility] ESTRELLITA JOHNSON RN (oncoming nurse) by Lisa Melara (offgoing nurse). Report included the following information SBAR, Kardex, MAR, Recent Results and Cardiac Rhythm sinus tachycardia. Patient resting quietly this night sleeping at long intervals.

## 2020-04-23 NOTE — PROGRESS NOTES
Initial Nutrition Assessment:    INTERVENTIONS/RECOMMENDATIONS:   · Diet per GI  · Trial of ensure clear  · Please document % meals and supplements consumed in flowsheet I/O's under intake     ASSESSMENT:   Chart reviewed. Medically noted for Metastatic disease to liver, lungs, bone adrenals, and retroperitoneal LNs, New onset paraplegia around T7-T8, Acute hypoxic respiratory failure, GUANAKO on CKD4, acute pancreatitis, CHF and PMH shown below. Assessing pt due to LOS. Multiple MDs recommending transition to hospice care. Pt is documented to have poor appetite. He's currently on GI lite diet due to pancreatitis, will trial ensure clear. Addendum: Pt reports poor PO intake x 4 months due to lack of appetite. He initially experience nausea and vomiting but that had resolved. Pt reports weighing ~180 lbs ~4 months ago. Weight history shows he weighed 175 lbs in Feb, showing a 38 lbs (21.7%) weight loss x 2 months. He has mild/moderate temporal and clavicle muscle wasting. Pt meets ASPEN criteria for Acute Severe Malnutrition. Please document  Acute Severe Protein Calorie Malnutrition in patient diagnoses. Patient meets criteria for as evidenced by:   ASPEN Malnutrition Criteria  Acute Illness, Chronic Illness, or Social/Enviornmental: Acute illness  Energy Intake: Less than/equal to 50% est energy req for greater than/equal to 5 days  Weight Loss: Greater than 7.5% x 3 mos  Muscle Mass Loss: Moderate  ASPEN Malnutrition Score - Acute Illness: 18  Acute Illness - Malnutrition Diagnosis: Severe malnutrition. Past Medical History:   Diagnosis Date    Hypotension     Vertigo        Diet Order: (GI lite)  % Eaten:  No data found.   Pertinent Medications: [x]Reviewed: decadron, colace, lasix, PPI, MVI, thiamine  Pertinent Labs: [x]Reviewed: K+ 5.7, elevated LFTs, lipase 1711  Food Allergies: [x]NKFA  []Other   Last BM: 4/22  Edema:   n/a     []RUE   []LUE   []RLE   []LLE      Pressure Injury:  n/a    [] Stage I [] Stage II   [] Stage III   [] Stage IV      Wt Readings from Last 30 Encounters:   04/19/20 62.1 kg (137 lb)   04/20/20 62.1 kg (136 lb 14.5 oz)   04/17/20 68 kg (150 lb)   02/15/20 79.8 kg (175 lb 14.8 oz)   03/05/18 90.7 kg (200 lb)       Anthropometrics:   Height:   Weight: 62.1 kg (137 lb)   IBW (%IBW):   ( ) UBW (%UBW):   (  %)   Last Weight Metrics:  Weight Loss Metrics 4/19/2020 4/17/2020 4/17/2020 4/17/2020 2/15/2020 3/5/2018   Today's Wt 137 lb - 150 lb - 175 lb 14.8 oz 200 lb   BMI - 20.23 kg/m2 - 22.15 kg/m2 25.98 kg/m2 29.53 kg/m2       BMI: Body mass index is 20.23 kg/m². This BMI is indicative of:   []Underweight    [x]Normal    []Overweight    [] Obesity   [] Extreme Obesity (BMI>40)     Estimated Nutrition Needs (Based on):   1850 Kcals/day(BMR: 1425 x 1.3) , 65 g(1 g/kg) Protein  Carbohydrate: At Least 130 g/day  Fluids: 1850 mL/day (1ml/kcal) or per primary team    NUTRITION DIAGNOSES:   Problem:  Inadequate protein-energy intake      Etiology: related to current medical condition      Signs/Symptoms: as evidenced by Poor appetite documented in EMR      NUTRITION INTERVENTIONS:  Meals/Snacks: General/healthful diet   Supplements: Commercial supplement              GOAL:   consume >50% of meals and ONS in 3-5 days    LEARNING NEEDS (Diet, Food/Nutrient-Drug Interaction):    [x] None Identified   [] Identified and Education Provided/Documented   [] Identified and Pt declined/was not appropriate     Cultureal, Orthodox, OR Ethnic Dietary Needs:    [x] None Identified   [] Identified and Addressed     [x] Interdisciplinary Care Plan Reviewed/Documented    [x] Discharge Planning: TBD      MONITORING /EVALUATION:      Food/Nutrient Intake Outcomes:  Total energy intake  Physical Signs/Symptoms Outcomes: Weight/weight change, Electrolyte and renal profile, Glucose profile, GI, GI profile    NUTRITION RISK:    [x] High              [] Moderate           []  Low  []  Minimal/Uncompromised    PT SEEN FOR:    []  MD Consult: []Calorie Count      []Diabetic Diet Education        []Diet Education     []Electrolyte Management     []General Nutrition Management and Supplements     []Management of Tube Feeding     []TPN Recommendations    []  RN Referral:  []MST score >=2     []Enteral/Parenteral Nutrition PTA     []Pregnant: Gestational DM or Multigestation     []Pressure Ulcer/Wound Care needs        []  Low BMI  [x]  LOS Referral       Mansoor Medrano RDN  Pager [phone]  Weekend Pager [phone]

## 2020-04-23 NOTE — PROGRESS NOTES
Physical Therapy    Approached pt for possible PT session. Pt refusing any activity today. Will attempt tomorrow, but if pt still refusing and given medical issues, will sign off.     Gardenia Luna, PT

## 2020-04-23 NOTE — PROGRESS NOTES
CARMELA Plan:    * TBD - anticipate LTC w/ hospice    > Pt has active Medicaid coverage; CM to complete UAI for potential LTC placement  > Palliative Care team actively following    Initial note: CM reviewed pt's chart and noted updates prior to moving forward with d/c planning. CM attempted to contact pt via bedside phone to check in, introduce role, and discuss plan moving forward; pt didn't answer the phone upon multiple attempts. CM peeked inside pt's room to determine if bedside phone was accessible; pt appears to be sleeping w/ bedside phone in reach. CM will re-attempt to make contact with pt later in the day. CM checked the [facility] Medicaid Web Portal to determine whether or not pt has had UAI completed in the past. CM determined that pt has not had UAI completed; CM will complete UAI in the event pt transitions to LTC. CM will report updates as they become available.     QUINTIN Garcia  Care Manager, [facility]  [phone]

## 2020-04-23 NOTE — PROGRESS NOTES
Oncology End of Shift Note      Bedside shift change report given to Mabel Sigala RN (incoming nurse) by Tomasz Salazar (outgoing nurse) on Jani Grad. Report included the following information SBAR, Kardex, Intake/Output and MAR. Shift Summary: Pt remained stable throughout shift. Scheduled meds given, PRN meds given for pain, education provided. Hourly rounding completed, pt turned q2h, pt refused PT/OT today, IV flushes well. Issues for Physician to Address:       Patient on Cardiac Monitoring?     [x] Yes  [] No    Rhythm: Sinus Tach         Tomasz Force

## 2020-04-23 NOTE — PROGRESS NOTES
NAME: Anita Gomes        :  1960        MRN:  [id number]        Assessment :    Plan:  --GUANAKO on CKD stage 4  Markedly distended bladder  Hyperkalemia  Mild Metabolic acidosis     New lung cancer w/ suspected mets to liver, pleura, left adrenal and mediastinal/hilar and retroperitoneal LN's     Pancreatitis-improving. Taking PO    Hypotension-mild    Paraplegia with new mets to spine   marked urine retention (keep rodriguez); 2nd to obstructive uropathy  Was improving, but Cr up today 2.97 with high K again    Start lasix 20 mg Q day  Ct IV NS- reduce rate to 75 ml/hr    Ct K restriction in diet  Started on flomax  Reduce neurontin to 300 mg BID    D/W pt     Poor prognosis; hospice under consideration       Subjective:     Chief Complaint: resting. No new c/o  Taking some po    Review of Systems: unable    Could not obtain due to:      Objective:     VITALS:   Last 24hrs VS reviewed since prior progress note.  Most recent are:  Visit Vitals  BP 99/61 (BP 1 Location: Right arm, BP Patient Position: At rest)   Pulse 73   Temp 98 °F (36.7 °C)   Resp 18   Wt 62.1 kg (137 lb)   SpO2 96%   BMI 20.23 kg/m²       Intake/Output Summary (Last 24 hours) at 2020 1153  Last data filed at 2020 0800  Gross per 24 hour   Intake 3300 ml   Output 950 ml   Net 2350 ml      Telemetry Reviewed:     PHYSICAL EXAM:  General: cachectic, chronic ill appearing in NAD  Bitemporal wasting  At/nc  No  JVD  No resp distress  No edema  Resting  Rodriguez+    Lab Data Reviewed: (see below)    Medications Reviewed: (see below)    PMH/SH reviewed - no change compared to H&P  ________________________________________________________________________  Care Plan discussed with:  Patient y    [facility]:          Comments   >50% of visit spent in counseling and coordination of care ________________________________________________________________________  Prosper Matias MD     Procedures: see electronic medical records for all procedures/Xrays and details which  were not copied into this note but were reviewed prior to creation of Plan.       LABS:  Recent Labs     04/23/20 0513 04/22/20 0239   WBC 14.2* 13.7*   HGB 13.1 12.6   HCT 39.5 36.8    170     Recent Labs     04/23/20 0513 04/22/20  1821 04/22/20 0239 04/21/20 0236     --  139 137   K 5.7* 5.1 4.9 5.4*     --  104 103   CO2 22  --  25 24   *  --  96* 85*   CREA 2.97*  --  2.59* 2.76*   *  --  133* 123*   CA 7.2*  --  7.4* 7.9*   MG 2.5* 2.6*  --   --      Recent Labs     04/22/20 0239 04/21/20 0236   SGOT 535*  --    *  --    TP 6.0*  --    ALB 2.3*  --    GLOB 3.7  --    LPSE 1,711* 2,184*     MEDICATIONS:  Current Facility-Administered Medications   Medication Dose Route Frequency    gabapentin (NEURONTIN) capsule 300 mg  300 mg Oral BID    furosemide (LASIX) tablet 20 mg  20 mg Oral DAILY    dexamethasone (DECADRON) 4 mg/mL injection 4 mg  4 mg IntraVENous Q6H    morphine injection 1.5 mg  1.5 mg IntraVENous Q3H PRN    naloxone (NARCAN) injection 0.4 mg  0.4 mg IntraVENous EVERY 2 MINUTES AS NEEDED    pantoprazole (PROTONIX) tablet 40 mg  40 mg Oral ACB    0.9% sodium chloride infusion  75 mL/hr IntraVENous CONTINUOUS    tamsulosin (FLOMAX) capsule 0.4 mg  0.4 mg Oral DAILY    thiamine mononitrate (B-1) tablet 100 mg  100 mg Oral DAILY    therapeutic multivitamin (THERAGRAN) tablet 1 Tab  1 Tab Oral DAILY    lactobac ac& pc-s.therm-b.anim (PATRIZIA Q/RISAQUAD)  1 Cap Oral DAILY    metoprolol tartrate (LOPRESSOR) tablet 50 mg  50 mg Oral BID    metoprolol (LOPRESSOR) injection 2.5 mg  2.5 mg IntraVENous Q6H PRN    sodium chloride (NS) flush 5-40 mL  5-40 mL IntraVENous Q8H    sodium chloride (NS) flush 5-40 mL  5-40 mL IntraVENous PRN    cefTRIAXone (ROCEPHIN) 1 g in 0.9% sodium chloride (MBP/ADV) 50 mL  1 g IntraVENous Q24H    HYDROcodone-acetaminophen (NORCO) 5-325 mg per tablet 1 Tab  1 Tab Oral Q4H PRN    docusate sodium (COLACE) capsule 100 mg  100 mg Oral BID    heparin (porcine) injection 5,000 Units  5,000 Units SubCUTAneous Q8H    prochlorperazine (COMPAZINE) with saline injection 5 mg  5 mg IntraVENous Q8H PRN

## 2020-04-24 VITALS
HEART RATE: 91 BPM | OXYGEN SATURATION: 97 % | BODY MASS INDEX: 20.23 KG/M2 | SYSTOLIC BLOOD PRESSURE: 91 MMHG | RESPIRATION RATE: 18 BRPM | DIASTOLIC BLOOD PRESSURE: 66 MMHG | TEMPERATURE: 97.6 F | WEIGHT: 137 LBS

## 2020-04-24 LAB
ANION GAP SERPL CALC-SCNC: 10 MMOL/L (ref 5–15)
BUN SERPL-MCNC: 116 MG/DL (ref 6–20)
BUN/CREAT SERPL: 40 (ref 12–20)
CALCIUM SERPL-MCNC: 6.9 MG/DL (ref 8.5–10.1)
CHLORIDE SERPL-SCNC: 110 MMOL/L (ref 97–108)
CO2 SERPL-SCNC: 21 MMOL/L (ref 21–32)
CREAT SERPL-MCNC: 2.92 MG/DL (ref 0.7–1.3)
ERYTHROCYTE [DISTWIDTH] IN BLOOD BY AUTOMATED COUNT: 15.4 % (ref 11.5–14.5)
GLUCOSE SERPL-MCNC: 113 MG/DL (ref 65–100)
HCT VFR BLD AUTO: 39.7 % (ref 36.6–50.3)
HGB BLD-MCNC: 13.1 G/DL (ref 12.1–17)
MCH RBC QN AUTO: 34.2 PG (ref 26–34)
MCHC RBC AUTO-ENTMCNC: 33 G/DL (ref 30–36.5)
MCV RBC AUTO: 103.7 FL (ref 80–99)
NRBC # BLD: 0 K/UL (ref 0–0.01)
NRBC BLD-RTO: 0 PER 100 WBC
PLATELET # BLD AUTO: 138 K/UL (ref 150–400)
PMV BLD AUTO: 10.5 FL (ref 8.9–12.9)
POTASSIUM SERPL-SCNC: 5.7 MMOL/L (ref 3.5–5.1)
RBC # BLD AUTO: 3.83 M/UL (ref 4.1–5.7)
SODIUM SERPL-SCNC: 141 MMOL/L (ref 136–145)
WBC # BLD AUTO: 12.3 K/UL (ref 4.1–11.1)

## 2020-04-24 PROCEDURE — 74011250637 HC RX REV CODE- 250/637: Performed by: GENERAL ACUTE CARE HOSPITAL

## 2020-04-24 PROCEDURE — 74011250637 HC RX REV CODE- 250/637: Performed by: INTERNAL MEDICINE

## 2020-04-24 PROCEDURE — 74011250637 HC RX REV CODE- 250/637: Performed by: NURSE PRACTITIONER

## 2020-04-24 PROCEDURE — 77010033678 HC OXYGEN DAILY

## 2020-04-24 PROCEDURE — 80048 BASIC METABOLIC PNL TOTAL CA: CPT

## 2020-04-24 PROCEDURE — 74011250636 HC RX REV CODE- 250/636: Performed by: INTERNAL MEDICINE

## 2020-04-24 PROCEDURE — 74011250637 HC RX REV CODE- 250/637: Performed by: PSYCHIATRY & NEUROLOGY

## 2020-04-24 PROCEDURE — 74011250636 HC RX REV CODE- 250/636: Performed by: GENERAL ACUTE CARE HOSPITAL

## 2020-04-24 PROCEDURE — 74011250636 HC RX REV CODE- 250/636: Performed by: NURSE PRACTITIONER

## 2020-04-24 PROCEDURE — 36415 COLL VENOUS BLD VENIPUNCTURE: CPT

## 2020-04-24 PROCEDURE — 74011250636 HC RX REV CODE- 250/636: Performed by: PSYCHIATRY & NEUROLOGY

## 2020-04-24 PROCEDURE — 85027 COMPLETE CBC AUTOMATED: CPT

## 2020-04-24 PROCEDURE — 74011000250 HC RX REV CODE- 250: Performed by: NURSE PRACTITIONER

## 2020-04-24 PROCEDURE — 94760 N-INVAS EAR/PLS OXIMETRY 1: CPT

## 2020-04-24 RX ORDER — ASPIRIN 325 MG/1
100 TABLET, FILM COATED ORAL DAILY
Qty: 30 TAB | Refills: 0 | Status: SHIPPED
Start: 2020-04-25

## 2020-04-24 RX ORDER — GABAPENTIN 300 MG/1
600 CAPSULE ORAL 2 TIMES DAILY
Qty: 120 CAP | Refills: 0 | Status: SHIPPED | OUTPATIENT
Start: 2020-04-24

## 2020-04-24 RX ORDER — THERA TABS 400 MCG
1 TAB ORAL DAILY
Qty: 30 TAB | Refills: 0 | Status: SHIPPED
Start: 2020-04-25

## 2020-04-24 RX ORDER — ONDANSETRON 4 MG/1
4 TABLET, FILM COATED ORAL
Qty: 30 TAB | Refills: 0 | Status: SHIPPED
Start: 2020-04-24

## 2020-04-24 RX ORDER — METOPROLOL TARTRATE 50 MG/1
50 TABLET ORAL 2 TIMES DAILY
Qty: 60 TAB | Refills: 0 | Status: SHIPPED
Start: 2020-04-24

## 2020-04-24 RX ORDER — SODIUM POLYSTYRENE SULFONATE 4.1 MEQ/G
15 POWDER, FOR SUSPENSION ORAL; RECTAL
Status: DISCONTINUED | OUTPATIENT
Start: 2020-04-24 | End: 2020-04-24 | Stop reason: SDUPTHER

## 2020-04-24 RX ORDER — TAMSULOSIN HYDROCHLORIDE 0.4 MG/1
0.4 CAPSULE ORAL DAILY
Qty: 30 CAP | Refills: 0 | Status: SHIPPED
Start: 2020-04-25

## 2020-04-24 RX ORDER — DILTIAZEM HYDROCHLORIDE 5 MG/ML
10 INJECTION INTRAVENOUS ONCE
Status: COMPLETED | OUTPATIENT
Start: 2020-04-24 | End: 2020-04-24

## 2020-04-24 RX ORDER — PANTOPRAZOLE SODIUM 40 MG/1
40 TABLET, DELAYED RELEASE ORAL
Qty: 30 TAB | Refills: 0 | Status: SHIPPED
Start: 2020-04-25

## 2020-04-24 RX ORDER — FUROSEMIDE 20 MG/1
40 TABLET ORAL DAILY
Qty: 30 TAB | Refills: 0 | Status: SHIPPED
Start: 2020-04-24

## 2020-04-24 RX ORDER — OXYCODONE HYDROCHLORIDE 5 MG/1
5 TABLET ORAL
Qty: 12 TAB | Refills: 0 | Status: SHIPPED | OUTPATIENT
Start: 2020-04-24 | End: 2020-04-27

## 2020-04-24 RX ORDER — SODIUM POLYSTYRENE SULFONATE 15 G/60ML
15 SUSPENSION ORAL; RECTAL
Status: COMPLETED | OUTPATIENT
Start: 2020-04-24 | End: 2020-04-24

## 2020-04-24 RX ADMIN — DEXAMETHASONE SODIUM PHOSPHATE 4 MG: 4 INJECTION, SOLUTION INTRAMUSCULAR; INTRAVENOUS at 05:30

## 2020-04-24 RX ADMIN — HEPARIN SODIUM 5000 UNITS: 5000 INJECTION INTRAVENOUS; SUBCUTANEOUS at 12:10

## 2020-04-24 RX ADMIN — HYDROCODONE BITARTRATE AND ACETAMINOPHEN 1 TABLET: 5; 325 TABLET ORAL at 12:09

## 2020-04-24 RX ADMIN — METOPROLOL TARTRATE 50 MG: 50 TABLET, FILM COATED ORAL at 09:29

## 2020-04-24 RX ADMIN — DEXTROSE MONOHYDRATE 5 MG/HR: 50 INJECTION, SOLUTION INTRAVENOUS at 10:24

## 2020-04-24 RX ADMIN — GABAPENTIN 600 MG: 300 CAPSULE ORAL at 09:29

## 2020-04-24 RX ADMIN — THIAMINE HCL TAB 100 MG 100 MG: 100 TAB at 09:29

## 2020-04-24 RX ADMIN — FUROSEMIDE 20 MG: 20 TABLET ORAL at 09:29

## 2020-04-24 RX ADMIN — TAMSULOSIN HYDROCHLORIDE 0.4 MG: 0.4 CAPSULE ORAL at 09:29

## 2020-04-24 RX ADMIN — Medication 10 ML: at 05:31

## 2020-04-24 RX ADMIN — SODIUM CHLORIDE 75 ML/HR: 900 INJECTION, SOLUTION INTRAVENOUS at 01:46

## 2020-04-24 RX ADMIN — HEPARIN SODIUM 5000 UNITS: 5000 INJECTION INTRAVENOUS; SUBCUTANEOUS at 05:30

## 2020-04-24 RX ADMIN — DEXAMETHASONE SODIUM PHOSPHATE 4 MG: 4 INJECTION, SOLUTION INTRAMUSCULAR; INTRAVENOUS at 12:09

## 2020-04-24 RX ADMIN — DOCUSATE SODIUM 100 MG: 100 CAPSULE, LIQUID FILLED ORAL at 09:29

## 2020-04-24 RX ADMIN — THERA TABS 1 TABLET: TAB at 09:29

## 2020-04-24 RX ADMIN — SODIUM POLYSTYRENE SULFONATE 15 G: 15 SUSPENSION ORAL; RECTAL at 11:00

## 2020-04-24 RX ADMIN — PANTOPRAZOLE SODIUM 40 MG: 40 TABLET, DELAYED RELEASE ORAL at 09:29

## 2020-04-24 RX ADMIN — HYDROCODONE BITARTRATE AND ACETAMINOPHEN 1 TABLET: 5; 325 TABLET ORAL at 05:29

## 2020-04-24 RX ADMIN — DILTIAZEM HYDROCHLORIDE 10 MG: 5 INJECTION INTRAVENOUS at 10:04

## 2020-04-24 RX ADMIN — Medication 1 CAPSULE: at 09:29

## 2020-04-24 NOTE — PROGRESS NOTES
NAME: Melany Cody        :  1960        MRN:  [id number]        Assessment :    Plan:  --GUANAKO on CKD stage 4  Markedly distended bladder  Hyperkalemia  Mild Metabolic acidosis     New lung cancer w/ suspected mets to liver, pleura, left adrenal and mediastinal/hilar and retroperitoneal LN's     Pancreatitis-improving. Taking PO    Hypotension-mild    Paraplegia with new mets to spine   marked urine retention (keep rodriguez); 2nd to obstructive uropathy  Cr stalled at high 2's with mild high K    Would d/c on Lasix 40 mg QD    Ct K restriction in diet  Started on flomax    D/W pt     Poor prognosis; hospice under consideration-pt not ready for comfort care; but DNR       Subjective:     Chief Complaint: resting. For ?d/c today    Review of Systems: deferred    Could not obtain due to:      Objective:     VITALS:   Last 24hrs VS reviewed since prior progress note.  Most recent are:  Visit Vitals  BP 91/66   Pulse 91   Temp 97.6 °F (36.4 °C)   Resp 18   Wt 62.1 kg (137 lb)   SpO2 97%   BMI 20.23 kg/m²       Intake/Output Summary (Last 24 hours) at 2020 1324  Last data filed at 2020 1058  Gross per 24 hour   Intake 50 ml   Output 1400 ml   Net -1350 ml      Telemetry Reviewed:     PHYSICAL EXAM:  General: cachectic, chronic ill appearing in NAD  No resp distress  No edema  Resting    Lab Data Reviewed: (see below)    Medications Reviewed: (see below)    PMH/SH reviewed - no change compared to H&P  ________________________________________________________________________  Care Plan discussed with:  Patient y    Family      RN     Care Manager                    Consultant:          Comments   >50% of visit spent in counseling and coordination of care       ________________________________________________________________________  Genaro Simon MD     Procedures: see electronic medical records for all procedures/Xrays and details which  were not copied into this note but were reviewed prior to creation of Plan.       LABS:  Recent Labs     04/24/20  0342 04/23/20  0513   WBC 12.3* 14.2*   HGB 13.1 13.1   HCT 39.7 39.5   * 163     Recent Labs     04/24/20  0342 04/23/20  0513 04/22/20  1821 04/22/20  0239    140  --  139   K 5.7* 5.7* 5.1 4.9   * 107  --  104   CO2 21 22  --  25   * 111*  --  96*   CREA 2.92* 2.97*  --  2.59*   * 117*  --  133*   CA 6.9* 7.2*  --  7.4*   MG  --  2.5* 2.6*  --      Recent Labs     04/22/20 0239   SGOT 535*   *   TP 6.0*   ALB 2.3*   GLOB 3.7   LPSE 1,711*     MEDICATIONS:  Current Facility-Administered Medications   Medication Dose Route Frequency    furosemide (LASIX) tablet 20 mg  20 mg Oral DAILY    gabapentin (NEURONTIN) capsule 600 mg  600 mg Oral BID    dexamethasone (DECADRON) 4 mg/mL injection 4 mg  4 mg IntraVENous Q6H    morphine injection 1.5 mg  1.5 mg IntraVENous Q3H PRN    naloxone (NARCAN) injection 0.4 mg  0.4 mg IntraVENous EVERY 2 MINUTES AS NEEDED    pantoprazole (PROTONIX) tablet 40 mg  40 mg Oral ACB    0.9% sodium chloride infusion  75 mL/hr IntraVENous CONTINUOUS    tamsulosin (FLOMAX) capsule 0.4 mg  0.4 mg Oral DAILY    thiamine mononitrate (B-1) tablet 100 mg  100 mg Oral DAILY    therapeutic multivitamin (THERAGRAN) tablet 1 Tab  1 Tab Oral DAILY    lactobac ac& pc-s.therm-b.anim (PATRIZIA Q/RISAQUAD)  1 Cap Oral DAILY    metoprolol tartrate (LOPRESSOR) tablet 50 mg  50 mg Oral BID    metoprolol (LOPRESSOR) injection 2.5 mg  2.5 mg IntraVENous Q6H PRN    sodium chloride (NS) flush 5-40 mL  5-40 mL IntraVENous Q8H    sodium chloride (NS) flush 5-40 mL  5-40 mL IntraVENous PRN    HYDROcodone-acetaminophen (NORCO) 5-325 mg per tablet 1 Tab  1 Tab Oral Q4H PRN    docusate sodium (COLACE) capsule 100 mg  100 mg Oral BID    heparin (porcine) injection 5,000 Units  5,000 Units SubCUTAneous Q8H    prochlorperazine (COMPAZINE) with saline injection 5 mg  5 mg IntraVENous Q8H PRN

## 2020-04-24 NOTE — ACP (ADVANCE CARE PLANNING)
Primary Decision Maker: Luigi Fried - Son  Advance Care Planning 4/24/2020   Patient's Healthcare Decision Maker is: Legal Next of Kin   Confirm Advance Directive None   Patient Would Like to Complete Advance Directive No   Does the patient have other document types MOST/MOLST/POST/POLST     Patient is alert, oriented X 4, able to engage in a conversation with this LCSW. \"I guess I'm not going to make it - if it's cancer\" per patient, when explained that he is having heart issues now compounded with his cancer diagnosis. Patient has needed time to process his new cancer diagnosis, he was aware of his heart issues, but never followed through with treatment in the past.     Discussed with patient the importance of documenting his wishes regarding his healthcare especially because he does not know or want to share the telephone numbers of anyone else in his life (son, friend etc). Patient (who turned 63 yo today), knows he is very sick. LCSW explained each part of the POST form to him. We discussed CPR and how that would not benefit him, based on his heart issues and now his cancer diagnosis. Explained protocol and the fact that he would have to be on a ventilator, would not be able to interact, talk or eat. Patient noted, \"I wouldn't want that\" (not be able to do all these activities). Patient understood that CPR is at time of death and he decided to not have CPR at end of life. He is DNAR. Patient would like to have limited medical intervention at this time. He would not want to be on a ventilator, but is okay with O2 and a BiPAP. He is not ready for comfort measures at this time. He would like \"to live\" as long as he can if he has some quality of life. He enjoys TV, loves Peps[employer] with lots of ice in his cup. Patient would not want a feeding tube if he is unable to eat by mouth. \"I would want to eat by mouth\". POST completed, patient understands each section, he signed the form.  Jewel Huangs BERHANE followed up with him and co-signed POST. Copies made for chart. Original should follow patient when he is discharged. (The plan is for patient to go to LTC).

## 2020-04-24 NOTE — PROGRESS NOTES
I reviewed pertinent labs and imaging, and discussed /agreed on the plan of care with Dr. Elie Eagle. Hospitalist Progress Note    NAME: Jh Dial   :  1960   MRN:  [id number]     Assessment / Plan:  Patient agreeable to LTC but still wishes to remain a full code. New onset paraplegia around T7-T8  Metastatic disease to liver, lungs, bone adrenals, and retroperitoneal LNs  · Poor prognosis   · Widespread metastatic diease found during this admission   · Multiple spinal metastasis. New onset bilateral lower extremity paraplegia. Neurologic exam sensory level T7-T8  · MRI lumbar spine:  Imaging findings consistent with widespread osseous metastatic disease. Largest lesion at L4 and in the left iliac bone with large lesions at S1 and S2 as well. No associated acute/chronic compression deformity. No canal compromise. Atypical cord signal intensity at the L2 level. Metastatic focus not excluded. Possible nodular densities associated with cauda equina nerve roots. Intrathecal metastatic disease is not excluded. The patient was not administered IV contrast. Retroperitoneal adenopathy and hepatic masses. · MRI thorac spine: 1. Severe motion artifact. 2. Heterogeneous appearance of the thoracic spinal cord from T3 to the thoracolumbar junction as above, concerning for metastatic involvement which may be leptomeningeal or intramedullary, particularly given the abnormal appearance on lumbar spine MRI. 3. Osseous metastatic disease without compression fracture. Epidural tumor extension cannot be completely excluded. 4. See recent CT chest abdomen pelvis findings regarding lung mass, adenopathy, hepatic and adrenal metastases. · MRI cervical spine:  1. Significant technical artifact due to motion as above. 2. Osseous metastasis T1 vertebral body. No acute or subacute fracture. No abnormality involving the cervical spinal cord allowing for motion.  See separate thoracic MRI report regarding cord abnormality in the upper thoracic region. 3. Partly visualized upper mediastinal and supraclavicular lymphadenopathy. · Decadron discontinued d/t ineffectiveness   · Neurosurgery states there is nothing they are able to do for patient   · Appreciate palliative/hospice input   · Appreciate oncology input   · Appreciate neurology input   · CT head negative  · Patient refused MRI of brain and LP  · Patient is not interested in radiation     Acute hypoxic respiratory failure secondary to   Pneumonia   Possible COPD  Tobacco abuse disorder   · COVID-19 ruled out   · Wean O2 for sats >93%  · CXR:  Stable right infrahilar opacity is concerning for underlying neoplasm. Potential right paratracheal lymphadenopathy. Correlation with chest CT is recommended following resolution of the COVID 19 status. · CT chest:  Mediastinal, right hilar, and retroperitoneal lymphadenopathy. Right lower lobe mass lesion as well as postobstructive consolidation. Interstitial infiltrate is noted throughout the right upper lobe. Pleural-based mass lesions throughout the right upper lobe. Hepatic metastases. Left adrenal masses concerning for metastatic disease. Marked distention of the bladder with soft tissue nodule involving the left bladder wall may represent an omental metastasis. · BC NGTD  · Procalcitonin 40.19  · Continue IV Rocephin and PO doxycycline   · Appreciate pulmonary input     GUANAKO on CKD IV  Hyperkalemia   · Cr 2.92; improved from 3.80 on admission   · Maintain rodriguez d/t urinary retention  · Continue Flomax  · Continue IVF   · Avoid nephrotoxins  · Appreciate nephrology input   · K 5.7  · Kayexalate ordered     Acute pancreatitis  Elevated LFTs  Hepatomegaly  · Lipase 1,711; improved   · , , ; all increasing   · CT abd/pel:  Mediastinal, right hilar, and retroperitoneal lymphadenopathy. Right lower lobe mass lesion as well as postobstructive consolidation.  Interstitial infiltrate is noted throughout the right upper lobe. Pleural-based mass lesions throughout the right upper lobe. Hepatic metastases. Left adrenal masses concerning for metastatic disease. Marked distention of the bladder with soft tissue nodule involving the left bladder wall may represent an omental metastasis. · Appreciate GI input   · Abd US:  1. Diffuse hepatic metastases. 2.  No intrahepatic biliary dilation. 3.  Dilated common bile duct measuring 9 mm. 4.  Normal gallbladder    Chronic systolic heart failure, diagnosed 02/2020  Secondary hyperaldosteronism d/t HF  Elevated troponin   Hx of SVT  · Troponin 0.35->0.33->0.31->0.29  · ECHO with EF 25-30% (02/2020)  · Continue BB     18.5 - 24.9 Normal weight / Body mass index is 20.23 kg/m². Code status: Full  Prophylaxis: Hep SQ  Recommended Disposition: TBD- recommend transitioning to hospice care      Subjective:     Chief Complaint / Reason for Physician Visit  Follow-up paraplegia. Discussed with RN events overnight. Review of Systems:  Symptom Y/N Comments  Symptom Y/N Comments   Fever/Chills n   Chest Pain n    Poor Appetite n   Edema     Cough    Abdominal Pain     Sputum    Joint Pain     SOB/GRIFFIN n   Pruritis/Rash     Nausea/vomit    Tolerating PT/OT     Diarrhea    Tolerating Diet y    Constipation    Other       Could NOT obtain due to:      Objective:     VITALS:   Last 24hrs VS reviewed since prior progress note.  Most recent are:  Patient Vitals for the past 24 hrs:   Temp Pulse Resp BP SpO2   04/24/20 0956  (!) 178 20 118/80 99 %   04/24/20 0730 97.6 °F (36.4 °C) (!) 110 18 110/68 99 %   04/24/20 0335 97.5 °F (36.4 °C) (!) 105 18 109/67 100 %   04/23/20 2242 97.4 °F (36.3 °C) (!) 116 18 100/64 100 %   04/23/20 1932 97.5 °F (36.4 °C) (!) 110 18 94/58 100 %   04/23/20 1537 98.3 °F (36.8 °C) 68 18 101/78 99 %       Intake/Output Summary (Last 24 hours) at 4/24/2020 1003  Last data filed at 4/24/2020 0335  Gross per 24 hour   Intake 50 ml   Output 600 ml   Net -550 ml        PHYSICAL EXAM:  General: NAD. Chronically ill-appearing AA male    EENT:  EOMI. Anicteric sclerae. MMM  Resp:  CTA, no wheezing or rales. No accessory muscle use  CV:  Tachycardic rhythm, No edema  GI:  Firm,  +Bowel sounds, hepatomegaly   Neurologic:  A&O, paraplegia to BLE  Psych:   Fair insight. Not anxious nor agitated  Skin:  No rashes. No jaundice    Reviewed most current lab test results and cultures  YES  Reviewed most current radiology test results   YES  Review and summation of old records today    NO  Reviewed patient's current orders and MAR    YES  PMH/SH reviewed - no change compared to H&P  ________________________________________________________________________  Care Plan discussed with:    Comments   Patient x    Family      RN x    Care Manager x    Consultant  x                      Multidiciplinary team rounds were held today with , nursing, pharmacist and clinical coordinator. Patient's plan of care was discussed; medications were reviewed and discharge planning was addressed. ________________________________________________________________________  Total NON critical care TIME:  25   Minutes    Total CRITICAL CARE TIME Spent:   Minutes non procedure based      Comments   >50% of visit spent in counseling and coordination of care     ________________________________________________________________________  Ashwin Johnson NP     Procedures: see electronic medical records for all procedures/Xrays and details which were not copied into this note but were reviewed prior to creation of Plan. LABS:  I reviewed today's most current labs and imaging studies.   Pertinent labs include:  Recent Labs     04/24/20  0342 04/23/20  0513 04/22/20  0239   WBC 12.3* 14.2* 13.7*   HGB 13.1 13.1 12.6   HCT 39.7 39.5 36.8   * 163 170     Recent Labs     04/24/20  0342 04/23/20  0513 04/22/20  1821 04/22/20 0239    140  --  139   K 5.7* 5.7* 5.1 4.9   * 107  --  104   CO2 21 22  --  25   * 117*  --  133*   * 111*  --  96*   CREA 2.92* 2.97*  --  2.59*   CA 6.9* 7.2*  --  7.4*   MG  --  2.5* 2.6*  --    ALB  --   --   --  2.3*   TBILI  --   --   --  0.9   SGOT  --   --   --  535*   ALT  --   --   --  295*       Signed: Dillon Brown, NP

## 2020-04-24 NOTE — PROGRESS NOTES
RN noticed on telemetry patient's heart rate was 200 bpm. NP called and rapid response RN notified. NP ordered stat EKG.

## 2020-04-24 NOTE — PROGRESS NOTES
Pt MEWS score of 3. Seems to be pt baseline. Primary team aware. Pt has no complaint at this time. Will continue to monitor pt. Discussed with RRT nurses. Pt has not been able to take Schedule Metoprolol over the last couple of days d/t low BP, increased in HR may be expected d/t not being able to take Metoprolol. Visit Vitals  BP 94/58 (BP 1 Location: Right arm, BP Patient Position: At rest)   Pulse (!) 110   Temp 97.5 °F (36.4 °C)   Resp 18   Wt 62.1 kg (137 lb)   SpO2 100%   BMI 20.23 kg/m²     0000: MEWS 4 d/t  and /64. Pt complaining of generalized pain. Schedule Decadron and PRN Morphine given. Will continue to monitor pt. Visit Vitals  /64 (BP 1 Location: Right arm, BP Patient Position: At rest)   Pulse (!) 116   Temp 97.4 °F (36.3 °C)   Resp 18   Wt 62.1 kg (137 lb)   SpO2 100%   BMI 20.23 kg/m²     0335: MEWS now 2. Pt resting in bed w/o distress. Labs drawn.     Visit Vitals  /67   Pulse (!) 105   Temp 97.5 °F (36.4 °C)   Resp 18   Wt 62.1 kg (137 lb)   SpO2 100%   BMI 20.23 kg/m²

## 2020-04-24 NOTE — DISCHARGE INSTRUCTIONS
HOSPITALIST DISCHARGE INSTRUCTIONS    NAME: Antelmo Loya   :  1960   MRN:  [id number]     Date/Time:  2020 12:38 PM    ADMIT DATE: 2020   DISCHARGE DATE: 2020     Attending Physician: Edgar Spence NP    DISCHARGE DIAGNOSIS:  Active Hospital Problems    Diagnosis Date Noted    Metastatic carcinoma involving bone with unknown primary site ([facility] [facility] 2020    Pain due to neoplasm 2020    Neoplastic malignant related fatigue 2020    Paraplegia, complete ([facility] [address] [facility] 2020    Spinal cord compression due to malignant neoplasm metastatic to spine ([facility] [address] 2020    Acute respiratory failure with hypoxia ([facility] [address] 2020       Medications: Per above medication reconciliation. Pain Management: per above medications    Recommended diet: Regular Diet    Recommended activity: Bedrest    Wound care: None    Indwelling devices: Srinivasan catheter, chronic with care per routine    Supplemental Oxygen: None    Required Lab work: Per SNF routine    Glucose management:  Accucheck ACHS with sliding scale per SNF protocol    Code status: DNR        Outside physician follow up: Follow-up Information     Follow up With Specialties Details Why Aaron GOYAL [address]  [phone]    None    None (198) Patient stated that they have no PCP      Roger Argueta MD Hematology and Oncology, Internal Medicine, Hematology, Oncology Schedule an appointment as soon as possible for a visit  [address]  [address] [place]  [phone]                 Skilled nursing facility/ SNF MD responsible for above on discharge. Information obtained by :  I understand that if any problems occur once I am at home I am to contact my physician. I understand and acknowledge receipt of the instructions indicated above. Physician's or R.N.'s Signature                                                                  Date/Time                                                                                                                                              Patient or Repres

## 2020-04-24 NOTE — DISCHARGE SUMMARY
Hospitalist Discharge Summary     Patient ID:  Dima Lentz  [id number]  58 y.o.  1960 4/17/2020    PCP on record: None    Admit date: 4/17/2020  Discharge date and time: 4/24/2020    DISCHARGE DIAGNOSIS:    Active Hospital Problems    Diagnosis Date Noted    Metastatic carcinoma involving bone with unknown primary site [facility]) 04/22/2020    Pain due to neoplasm 04/22/2020    Neoplastic malignant related fatigue 04/22/2020    Paraplegia, complete ([facility] 04/20/2020    Spinal cord compression due to malignant neoplasm metastatic to spine ([facility] 04/20/2020    Acute respiratory failure with hypoxia ([facility] 04/17/2020       CONSULTATIONS:  IP CONSULT TO GASTROENTEROLOGY  IP CONSULT TO NEPHROLOGY  IP CONSULT TO NEUROLOGY  IP CONSULT TO PALLIATIVE CARE - PROVIDER  IP CONSULT TO HEMATOLOGY  IP CONSULT TO RADIATION ONCOLOGY  IP CONSULT TO PALLIATIVE CARE - PROVIDER  IP CONSULT TO PULMONOLOGY  IP CONSULT TO NEUROSURGERY    Excerpted HPI from H&P of Sudhir Lares MD:  Nohemi Guerra is a 64 y.o.  male who presents with CC listed above. Pt is an unreliable historian as his information is convoluted at best. He knows he was hospitalized in February at [facility]. He denies going to any follow up appointment. He endorses chronic complains of cough and vertigo. Now feeling weak and having generalized abdominal pain. This has been on going for supposedly 6 weeks. \"  ______________________________________________________________________  DISCHARGE SUMMARY/HOSPITAL COURSE:  for full details see H&P, daily progress notes, labs, consult notes. Sheila Arriaga y.o. male was admitted to [facility] on 4/17/2020 and treated for the following medical complaints:     New onset paraplegia around T7-T8  Metastatic disease to liver, lungs, bone adrenals, and retroperitoneal LNs  · Poor prognosis   · Widespread metastatic diease found during this admission   · Multiple spinal metastasis.   New onset bilateral lower extremity paraplegia. Neurologic exam sensory level T7-T8  · MRI lumbar spine:  Imaging findings consistent with widespread osseous metastatic disease. Largest lesion at L4 and in the left iliac bone with large lesions at S1 and S2 as well. No associated acute/chronic compression deformity. No canal compromise. Atypical cord signal intensity at the L2 level. Metastatic focus not excluded. Possible nodular densities associated with cauda equina nerve roots. Intrathecal metastatic disease is not excluded. The patient was not administered IV contrast. Retroperitoneal adenopathy and hepatic masses. · MRI thorac spine: 1. Severe motion artifact. 2. Heterogeneous appearance of the thoracic spinal cord from T3 to the thoracolumbar junction as above, concerning for metastatic involvement which may be leptomeningeal or intramedullary, particularly given the abnormal appearance on lumbar spine MRI. 3. Osseous metastatic disease without compression fracture. Epidural tumor extension cannot be completely excluded. 4. See recent CT chest abdomen pelvis findings regarding lung mass, adenopathy, hepatic and adrenal metastases. · MRI cervical spine:  1. Significant technical artifact due to motion as above. 2. Osseous metastasis T1 vertebral body. No acute or subacute fracture. No abnormality involving the cervical spinal cord allowing for motion. See separate thoracic MRI report regarding cord abnormality in the upper thoracic region. 3. Partly visualized upper mediastinal and supraclavicular lymphadenopathy.   · Decadron discontinued d/t ineffectiveness   · Neurosurgery states there is nothing they are able to do for patient   · Appreciate palliative/hospice input   · Appreciate oncology input   · Appreciate neurology input   · CT head negative  · Patient refused MRI of brain and LP  · Patient is not interested in radiation      Acute hypoxic respiratory failure secondary to   Pneumonia   Possible COPD  Tobacco abuse disorder   · COVID-19 ruled out   · Wean O2 for sats >93%  · CXR:  Stable right infrahilar opacity is concerning for underlying neoplasm. Potential right paratracheal lymphadenopathy. Correlation with chest CT is recommended following resolution of the COVID 19 status. · CT chest:  Mediastinal, right hilar, and retroperitoneal lymphadenopathy. Right lower lobe mass lesion as well as postobstructive consolidation. Interstitial infiltrate is noted throughout the right upper lobe. Pleural-based mass lesions throughout the right upper lobe. Hepatic metastases. Left adrenal masses concerning for metastatic disease. Marked distention of the bladder with soft tissue nodule involving the left bladder wall may represent an omental metastasis. · BC NGTD  · Procalcitonin 40.19  · Continue IV Rocephin and PO doxycycline   · Appreciate pulmonary input      GUANAKO on CKD IV  Hyperkalemia   · Cr 2.92; improved from 3.80 on admission   · Maintain rodriguez d/t urinary retention  · Continue Flomax  · Continue IVF   · Avoid nephrotoxins  · Appreciate nephrology input   · K 5.7  · Kayexalate ordered      Acute pancreatitis  Elevated LFTs  Hepatomegaly  · Lipase 1,711; improved   · , , ; all increasing   · CT abd/pel:  Mediastinal, right hilar, and retroperitoneal lymphadenopathy. Right lower lobe mass lesion as well as postobstructive consolidation. Interstitial infiltrate is noted throughout the right upper lobe. Pleural-based mass lesions throughout the right upper lobe. Hepatic metastases. Left adrenal masses concerning for metastatic disease. Marked distention of the bladder with soft tissue nodule involving the left bladder wall may represent an omental metastasis. · Appreciate GI input   · Abd US:  1.  Diffuse hepatic metastases. 2.  No intrahepatic biliary dilation. 3.  Dilated common bile duct measuring 9 mm.  4.  Normal gallbladder     Chronic systolic heart failure, diagnosed 02/2020  Secondary hyperaldosteronism d/t HF  Elevated troponin   Hx of SVT  · Troponin 0.35->0.33->0.31->0.29  · ECHO with EF 25-30% (02/2020)  · Continue BB     Severe protein calorie malnutrition  · Nutritional support  · Continue supplements    Patient's plan of care has been reviewed with them. Patient and/or family have verbally conveyed their understanding and agreement of the patient's signs, symptoms, diagnosis, treatment and prognosis and additionally agree to follow up as recommended or return to [facility] should their condition change prior to follow-up. Discharge instructions have also been provided to the patient with some educational information regarding their diagnosis as well a list of reasons why they would want to return to the office prior to their follow-up appointment should their condition change. Tariq Espitia to avoid frequent ED visits. [facility] can treat; pains, sprains, cuts, wounds, high fevers, upper respiratory infections and much more. There medical team is equipped with all the tools necessary to provide advanced medical care in the comfort of you home, workplace, or location of need. The medical team consists of doctors, nurse practitioners, and EMTs. [employer] is available 7 days per week 9 am to 9 pm.   Request care by calling [phone] or by going online at [phone] [address]  _______________________________________________________________________  Patient seen and examined by me on discharge day. Pertinent Findings:  Gen:    Not in distress  Chest: Clear lungs  CVS:   Regular rhythm. No edema  Abd:  Soft, not distended, not tender  Neuro:  Alert, oriented  _______________________________________________________________________  DISCHARGE MEDICATIONS:   Current Discharge Medication List      START taking these medications    Details   furosemide (LASIX) 20 mg tablet Take 2 Tabs by mouth daily.   Qty: 30 Tab, Refills: 0      gabapentin (NEURONTIN) 300 mg capsule Take 2 Caps by mouth two (2) times a day. Max Daily Amount: 1,200 mg. Qty: 120 Cap, Refills: 0    Associated Diagnoses: Spinal cord compression due to malignant neoplasm metastatic to spine (HCC)      metoprolol tartrate (LOPRESSOR) 50 mg tablet Take 1 Tab by mouth two (2) times a day. Qty: 60 Tab, Refills: 0      pantoprazole (PROTONIX) 40 mg tablet Take 1 Tab by mouth Daily (before breakfast). Qty: 30 Tab, Refills: 0      tamsulosin (FLOMAX) 0.4 mg capsule Take 1 Cap by mouth daily. Qty: 30 Cap, Refills: 0      therapeutic multivitamin (THERAGRAN) tablet Take 1 Tab by mouth daily. Qty: 30 Tab, Refills: 0      thiamine mononitrate (B-1) 100 mg tablet Take 1 Tab by mouth daily. Qty: 30 Tab, Refills: 0      ondansetron hcl (Zofran) 4 mg tablet Take 1 Tab by mouth every eight (8) hours as needed for Nausea or Vomiting. Qty: 30 Tab, Refills: 0      oxyCODONE IR (Roxicodone) 5 mg immediate release tablet Take 1 Tab by mouth every six (6) hours as needed for Pain for up to 3 days. Max Daily Amount: 20 mg.  Qty: 12 Tab, Refills: 0    Associated Diagnoses: Spinal cord compression due to malignant neoplasm metastatic to spine ([facility] [address] Metastatic carcinoma involving bone with unknown primary site [facility]); Pain due to neoplasm         STOP taking these medications       amoxicillin-clavulanate (AUGMENTIN) 875-125 mg per tablet Comments:   Reason for Stopping:                 Patient Follow Up Instructions: Activity: Bedrest  Diet: Regular Diet  Wound Care: None needed    Follow-up with PCP in 1 week.     Follow-up Information     Follow up With Specialties Details Why Aaron GOYAL [address]  [phone]    None    None ([phone]) Patient stated that they have no PCP      Portia Bello MD Hematology and Oncology, Internal Medicine, Hematology, Oncology Schedule an appointment as soon as possible for a visit  [address]  [address]   [address]  [phone]          ________________________________________________________________    Risk of deterioration: Moderate    Condition at Discharge:  Stable  __________________________________________________________________    Disposition  SNF/LTC    ____________________________________________________________________    Code Status: DNR/DNI  ___________________________________________________________________      Total time in minutes spent coordinating this discharge (includes going over instructions, follow-up, prescriptions, and preparing report for sign off to her PCP) :  31 minutes    Signed:  Marylen Nett, NP

## 2020-04-24 NOTE — PROGRESS NOTES
Physical Therapy    Pt now with acute cardiac issues with HR to 200+. Rapid response called. Pt being transferred. Per nurse, pt has now signed a DNR. Given pt's refusals and acute medical issues, will sign off at this time. Please re-consult if situation warrants.     Charlie Martin PT

## 2020-04-24 NOTE — PROGRESS NOTES
Pt now with acute cardiac issues with HR to 200+. Rapid response called. Pt being transferred. Per nurse, pt has now signed a DNR. Given pt's refusals and acute medical issues, will sign off at this time.  Please re-consult if situation warrants.

## 2020-04-24 NOTE — PROGRESS NOTES
Follow up visit with . Mehnaz Clayton after pt's case discussed with Palliative IDT. Today is pt's birthday. Offered presence and support to patient as he expects to be discharged to a facility this afternoon. Explored his hopes and concerns. Pt requested prayer, which was offered. Assurance of prayers also extended.     Megan Hope, Palliative

## 2020-04-24 NOTE — PROGRESS NOTES
Palliative Medicine Consult    Patient Name: Moise Sosa  YOB: 1960    Date of Initial Consult: 4/21/2020  Reason for Consult: Care Decisions  Requesting Provider: Elaina Osorio NP   Primary Care Physician: None      SUMMARY:   Moise Sosa is a 64 y.o. with newly-diagnosed widely metastatic disease, new onset paraplegia, CKD, systolic CHF who was admitted on 4/17/2020 with a diagnosis of AHFR, acute pancreatitis, elevated LFTs, elevated troponin, GUANAKO on CKD. On further evaluation he was found to have newly-diagnosed widely metastatic disease to the lungs, liver, adrenals, retroperitoneal LNs, and diffuse bony and intrathecal mets for which acute neurosurgical intervention was not indicated. He has also been evaluated by Neurology for new-onset paraplegic around T7-T8, for which he is currently receiving decadron for possible cord compression from metastatic disease. He has also been evaluated by Radiation Oncology, who indicated that radiation intervention is unlikely to provide neurologic benefit. He has been evaluated by GI for pancreatitis. Given the extent of widespread disease and overall poor prognosis, Oncology has recommended transitioning to hospice. Current medical issues leading to Palliative Medicine involvement include: Care decisions. PALLIATIVE DIAGNOSES:   1. Newly-diagnosed widely metastatic disease, likely lung primary per Oncology   2. New-onset paraplegia around T7-T8  3. CKD  4. Systolic CHF  5. Pancreatitis  6. Pain related to metastasis  7. Urinary retention  8. Fatigue due to neoplasm  9. Decreased appetite  10. Goals of care      PLAN:   1. Pain:  Patient states his pain is better and he is able to sit up when he wants now.    -continue 5mg Norco q. 4 hours prn.  2. Appetite: not eating much, states he feels too full all the time, is having BMs. abd distended.    3. Goals of care: completed the POST form today, indicating DNAR, limited medical interventions, no feeding tube. 4. Patient's care discussed with bedside nurse. GOALS OF CARE / TREATMENT PREFERENCES:   [====Goals of Care====]  GOALS OF CARE:  Patient/Health Care Proxy Stated Goals: Prolong life      TREATMENT PREFERENCES:   Code Status: DNR    Advance Care Planning:  Advance Care Planning 4/24/2020   Patient's Healthcare Decision Maker is: Legal Next of Kin   Confirm Advance Directive None   Patient Would Like to Complete Advance Directive No   Does the patient have other document types MOST/MOLST/POST/POLST                 The palliative care team has discussed with patient / health care proxy about goals of care / treatment preferences for patient.  [====Goals of Care====]         HISTORY:     History obtained from: chart review, palliative IDT, bedside nurse, patient    CHIEF COMPLAINT: tired    HPI/SUBJECTIVE:    The patient is:   [x] Verbal and participatory  [] Non-participatory due to:     Patient says he cannot remember everything he has been told during this hospitalization but that he knows he is not doing well. \"Just keep me alive,\" he says at one point. We did discuss that his cancer is not curable. I introduced the concept of hospice as specialized care for those with serious illness who wish to focus on comfort and quality of life at the end of life (rather than pursuing tests, procedures, hospitalizations, etc.). After I introduced hospice he asked, Nabeel Menon we talk about his later? \" He asked to not continue further discussion, and so I was unable to complete full ROS due to this. Patient states he has one son Tammy Shetty and two grandchildren. He indicates that it would be okay to talk to his son about care decisions but that he would have to look for his son's number and does not know where his son is at any given moment since he is a .         Clinical Pain Assessment (nonverbal scale for severity on nonverbal patients):   Clinical Pain Assessment  Severity: 8  Location: back radiating down leg, neck radiating down both arms, across chest and abdomen  Character: shooting, burning, deep and dull  Duration: month  Effect: cannot sit up  Factors: movement  Frequency: constant    Adult Nonverbal Pain Scale  Face: Occasional grimace, tearing, frowning, wrinkled forehead  Activity (Movement): Restless, excessive activity and/or withdrawal reflexes  Guarding: Rigid, stiff  Physiology (Vital Signs): Stable vital signs  Respiratory: Baseline RR/SpO2 compliant with ventilator  Total Score: 5       FUNCTIONAL ASSESSMENT:     Palliative Performance Scale (PPS):  PPS: 20       PSYCHOSOCIAL/SPIRITUAL SCREENING:     Advance Care Planning:  Advance Care Planning 4/24/2020   Patient's Healthcare Decision Maker is: Legal Next of Kin   Confirm Advance Directive None   Patient Would Like to Complete Advance Directive No   Does the patient have other document types MOST/MOLST/POST/POLST        Any spiritual / Oriental orthodox concerns:  [] Yes /  [x] No    Caregiver Burnout:  [] Yes /  [] No /  [x] No Caregiver Present      Anticipatory grief assessment:   [] Normal  / [x] Maladaptive       ESAS Anxiety: Anxiety: 0    ESAS Depression: Depression: 5        REVIEW OF SYSTEMS:     Positive and pertinent negative findings in ROS are noted above in HPI. The following systems were [x] reviewed / [] unable to be reviewed as noted in HPI  Other findings are noted below. Systems: constitutional, ears/nose/mouth/throat, respiratory, gastrointestinal, genitourinary, musculoskeletal, integumentary, neurologic, psychiatric, endocrine. Positive findings noted below.   Modified ESAS Completed by: provider   Fatigue: 7 Drowsiness: 2   Depression: 5 Pain: 8   Anxiety: 0 Nausea: 0   Anorexia: 8 Dyspnea: 0     Constipation: No     Stool Occurrence(s): 2        PHYSICAL EXAM:     From RN flowsheet:  Wt Readings from Last 3 Encounters:   04/19/20 137 lb (62.1 kg)   04/20/20 136 lb 14.5 oz (62.1 kg)   04/17/20 150 lb (68 kg)     Blood pressure 91/66, pulse 91, temperature 97.6 °F (36.4 °C), resp. rate 18, weight 137 lb (62.1 kg), SpO2 97 %. Pain Scale 1: Numeric (0 - 10)  Pain Intensity 1: 2  Pain Onset 1: weeks  Pain Location 1: Head, Neck  Pain Orientation 1: Upper  Pain Description 1: Aching  Pain Intervention(s) 1: Medication (see MAR), Relaxation technique, Repositioned, Rest  Last bowel movement, if known:     Constitutional: thin, chronically ill appearing, lying abed with eyes closed resting, fatigued  Eyes: pupils equal, lids normal, no drainage  ENMT: no nasal discharge, moist mucous membranes  Cardiovascular: tachy, irreg  Respiratory: breathing not labored, normal rate, no audible wheezing  Gastrointestinal: distended, tender to deep palpation, +BS  Musculoskeletal: +generalized weakness, unable to move legs or feet  Skin: appears dry without apparent rash of face or arms  Neurologic: alert, answers direct questions  Psychiatric: blunted affect, answers questions appropriately     HISTORY:     Active Problems:    Acute respiratory failure with hypoxia ([address] (4/17/2020)      Paraplegia, complete ([address] (4/20/2020)      Spinal cord compression due to malignant neoplasm metastatic to spine ([address] (4/20/2020)      Metastatic carcinoma involving bone with unknown primary site [facility]) (4/22/2020)      Pain due to neoplasm (4/22/2020)      Neoplastic malignant related fatigue (4/22/2020)      Past Medical History:   Diagnosis Date    Hypotension     Vertigo       History reviewed. No pertinent surgical history. Family History   Problem Relation Age of Onset    Heart Disease Mother     Other Father         Unknown    Other Child         Gunshot wounds      History reviewed, no pertinent family history. Social History     Tobacco Use    Smoking status: Former Smoker    Smokeless tobacco: Never Used   Substance Use Topics    Alcohol use: Yes     Comment: 2-3 cans of beer a day.       No Known Allergies   Current Facility-Administered Medications   Medication Dose Route Frequency    sodium polystyrene (KAYEXALATE) 15 gram/60 mL oral suspension 15 g  15 g Oral NOW    furosemide (LASIX) tablet 20 mg  20 mg Oral DAILY    gabapentin (NEURONTIN) capsule 600 mg  600 mg Oral BID    dexamethasone (DECADRON) 4 mg/mL injection 4 mg  4 mg IntraVENous Q6H    morphine injection 1.5 mg  1.5 mg IntraVENous Q3H PRN    naloxone (NARCAN) injection 0.4 mg  0.4 mg IntraVENous EVERY 2 MINUTES AS NEEDED    pantoprazole (PROTONIX) tablet 40 mg  40 mg Oral ACB    0.9% sodium chloride infusion  75 mL/hr IntraVENous CONTINUOUS    tamsulosin (FLOMAX) capsule 0.4 mg  0.4 mg Oral DAILY    thiamine mononitrate (B-1) tablet 100 mg  100 mg Oral DAILY    therapeutic multivitamin (THERAGRAN) tablet 1 Tab  1 Tab Oral DAILY    lactobac ac& pc-s.therm-b.anim (PATRIZIA Q/RISAQUAD)  1 Cap Oral DAILY    metoprolol tartrate (LOPRESSOR) tablet 50 mg  50 mg Oral BID    metoprolol (LOPRESSOR) injection 2.5 mg  2.5 mg IntraVENous Q6H PRN    sodium chloride (NS) flush 5-40 mL  5-40 mL IntraVENous Q8H    sodium chloride (NS) flush 5-40 mL  5-40 mL IntraVENous PRN    HYDROcodone-acetaminophen (NORCO) 5-325 mg per tablet 1 Tab  1 Tab Oral Q4H PRN    docusate sodium (COLACE) capsule 100 mg  100 mg Oral BID    heparin (porcine) injection 5,000 Units  5,000 Units SubCUTAneous Q8H    prochlorperazine (COMPAZINE) with saline injection 5 mg  5 mg IntraVENous Q8H PRN          LAB AND IMAGING FINDINGS:     Lab Results   Component Value Date/Time    WBC 12.3 (H) 04/24/2020 03:42 AM    HGB 13.1 04/24/2020 03:42 AM    PLATELET 472 (L) 02/62/1469 03:42 AM     Lab Results   Component Value Date/Time    Sodium 141 04/24/2020 03:42 AM    Potassium 5.7 (H) 04/24/2020 03:42 AM    Chloride 110 (H) 04/24/2020 03:42 AM    CO2 21 04/24/2020 03:42 AM     (H) 04/24/2020 03:42 AM    Creatinine 2.92 (H) 04/24/2020 03:42 AM    Calcium 6.9 (L) 04/24/2020 03:42 AM    Magnesium 2.5 (H) 04/23/2020 05:13 AM    Phosphorus 3.5 04/20/2020 04:22 AM      Lab Results   Component Value Date/Time    AST (SGOT) 535 (H) 04/22/2020 02:39 AM    Alk. phosphatase 686 (H) 04/22/2020 02:39 AM    Protein, total 6.0 (L) 04/22/2020 02:39 AM    Albumin 2.3 (L) 04/22/2020 02:39 AM    Globulin 3.7 04/22/2020 02:39 AM     No results found for: INR, PTMR, PTP, PT1, PT2, APTT, INREXT, INREXT   Lab Results   Component Value Date/Time    Iron 62 04/19/2020 03:29 AM    TIBC 183 (L) 04/19/2020 03:29 AM    Iron % saturation 34 04/19/2020 03:29 AM    Ferritin 1,043 (H) 04/19/2020 03:29 AM      No results found for: PH, PCO2, PO2  No components found for: [place]   Lab Results   Component Value Date/Time     (H) 04/17/2020 04:32 PM    CK - MB 4.2 (H) 04/17/2020 04:32 PM                Total time: 30  Counseling / coordination time, spent as noted above: 25  > 50% counseling / coordination?: y    Prolonged service was provided for  []30 min   []75 min in face to face time in the presence of the patient, spent as noted above. Time Start:   Time End:   Note: this can only be billed with 92109 (initial) or 89205 (follow up). If multiple start / stop times, list each separately.

## 2020-04-24 NOTE — PROGRESS NOTES
CARMELA Plan:    * LTC ([facility]) w/ potential transition to hospice     > Call report to [facility] at d/c ([phone]); pt assigned to room 80  > CM will initiate UAI today and fax copy to Inder Felicexiomy per request (f: [phone])  > [facility] transport confirmed for 2:30 PM; PCS on chart    3:39 PM: CM faxed pt's d/c summary to [facility] for reference. CM will fax copy of UAI to [facility] once available. No further CM needs identified; CM will remain available for consult if additional needs arise before pt leaves facility. 1:53 PM: CM confirmed [facility] medical transport for 2:30 PM; PCS will be placed on chart for reference. 1:06 PM: CM contacted pt's Medicaid insurance provider ([address]: [phone]) to receive authorization for medical transport; CM received authorization ([id number]). CM sent referral via [employer] to [facility] to secure medical transport for d/c; CM will report transport time once available. 12:24 PM: CM confirmed with MFA liaison Emma Resides: [phone]) that [facility] is able to accept pt for d/c today. CM reported update to attending N.P; attending N.P confirmed pt is ready for d/c and is agreeable to d/c plan. CM will fax copy of UAI to [facility] for their records. CM will secure Medicaid transport for d/c. Initial note: CM checked on the status of referrals sent for LTC placement; [facility]. CM confirmed [facility] accepted pt for LTC placement at d/c. Per chart review, pt is not currently open to hospice intervention. CM will initiate UAI today and submit for processing. CM will report updates as they become available.     Care Management Interventions  PCP Verified by CM: No  Mode of Transport at Discharge: [facility] Transport Time of Discharge: [address] [facility] [address] (CM Consult): Discharge Planning, [address]  Discharge Durable Medical Equipment: No  Physical Therapy Consult: Yes  Occupational Therapy Consult: Yes  Speech Therapy Consult: No  Current Support Network: Other(Pt was homeless prior to admission)  Confirm Follow Up Transport: Other (see comment)(Medicaid transport)  The Plan for Transition of Care is Related to the Following Treatment Goals : LTC  The Patient and/or Patient Representative was Provided with a Choice of Provider and Agrees with the Discharge Plan?: Yes  Freedom of Choice List was Provided with Basic Dialogue that Supports the Patient's Individualized Plan of Care/Goals, Treatment Preferences and Shares the Quality Data Associated with the Providers?: Yes   Resource Information Provided?: No  Discharge Location  Discharge Placement: [address])     Transition of Care Plan to SNF/Rehab    SNF/Rehab Transition:  Patient has been accepted to [facility] and meets criteria for admission. Patient will transported by [facility] and expected to leave at 2:30 PM    Communication to Patient/Family:  Met confirmed with patient that he is agreeable to the transition plan. Communication to SNF/Rehab:  Bedside RN has been notified to update the transition plan to the facility and call report ([phone])  Discharge information has been updated on the AVS.     Discharge instructions were fax'd to facility at ([phone]). Nursing Please include all hard scripts for controlled substances, med rec and dc summary, and AVS in packet.      Reviewed and confirmed with facility, [facility] that they can manage the patient care needs for the following:     Dave Elise with (X) only those applicable:    Medication:  [x]  Medications will be available at the facility  []  IV Antibiotics  []  Controlled Substance - hard copy to be sent with patient   []  Weekly Labs   Documents:  [x] Hard RX  [] MAR  [x] Kardex  [x] AVS  []Transfer Summary  [x]Discharge   Equipment:  []  CPAP/BiPAP  [] Wound Vacuum  [x]  Srinivasan or Urinary Device  []  PICC/Central Line  []  Nebulizer  []  Ventilator   Treatment:  []Isolation (for MRSA, VRE, etc.)  []Surgical Drain Management  []Tracheostomy Care  []Dressing Changes  []Dialysis with transportation and chair time  []PEG Care  [x]Oxygen - 2L of O2  []Daily Weights for Heart Failure   Dietary:  []Any diet limitations  []Tube Feedings   []Total Parenteral Management (TPN)   Eligible for Medicaid Long Term Services and Supports  Yes:  [] Eligible for medical assistance or will become eligible within 180 days and UAI completed. [] Provider/Patient and/or support system has requested screening. [] UAI copy provided to patient or responsible party  [x] UAI unavailable at discharge will send once processed to SNF provider. [] UAI unavailable at discharged mailed to patient  No:   [] Private pay and is not financially eligible for Medicaid within the next 180 days. [] Reside out-of-state.   [] A residents of a state owned/operated facility that is licensed  by [address] [employer] [facility] or [facility]  [] Enrollment in [facility] hospice services  [] [address] [place] [address] [facility]  [] Patient /Family declines to have screening completed or provide financial information for screening     Financial Resources:  Medicaid    [] Initiated and application pending   [x] Full coverage     Advanced Care Plan:  []Surrogate Decision Maker of Care  []POA  [x]Communicated Code Status: DNR   Other       Charlotte Reed, [address] [place], [place] [address], [facility]  [phone]

## 2020-04-24 NOTE — PROGRESS NOTES
Critical Care Documentation    Called to bedside by RN for patient sustaining a heart rate >170. EKG shows AFib with RVR. Patient alert and oriented. NAD. Patient recently admitted to [facility] (02/2020) and found to be in SVT. Patient was started on Coreg. It was recommended for patient to have cardiac cath by cardiology. Patient refused. Patient did not follow-up with cardiology since discharge. Patient continues to refuse any invasive intervention but still wants to remain a full code.      Plan:  K 5.7- Kayexalate ordered  Diltiazem bolus and gtt  Transfer patient to PCU

## 2020-04-24 NOTE — PROGRESS NOTES
Oncology End of Shift Note      Bedside shift change report given to [facility], RN (incoming nurse) by Pooja Valentin (outgoing nurse) on [facility]. Report included the following information SBAR, Kardex, Intake/Output and MAR. Shift Summary: Pt had elevated MEWS score during night. See previous note. PRN Morphine and Norco through night. Pt with consistent pain. Srinivasan draining well. Incontinent to Bowel. Incontinence care provided. Friction wound to sacrum found during incontinence care. Zinc based paste applied. Placed on turn team.  Pt has been refusing turning in past.  Educated on need of turning. Labs drawn. Issues for Physician to Address:  Pt not realistic about goals of care/disposition. Cardiac monitor order ; please re-order. Patient on Cardiac Monitoring?     [x] Yes  [] No    Rhythm: Sinus Tach       Pooja Valentin

## 2020-04-24 NOTE — PROGRESS NOTES
I have reviewed discharge instructions with the patient. The patient verbalized understanding. Discharge medications reviewed with patient and appropriate educational materials and side effects teaching were provided. Follow-up appointments reviewed. Opportunity for questions and clarification was provided. Venous access removed without difficulty. Patient's belongings gathered and sent with patient. Patient is ready for discharge. Hard scripts were given to patient. Patient is being discharged with rodriguez per MD order.     Talia Pan

## 2020-04-24 NOTE — PROGRESS NOTES
RAPID RESPONSE TEAM    Responded to phone call by tele monitoring to [phone] for HR low 200's. On arrival pt is in bed, NAD, c/o L sided non radiating chest discomfort. EKG in progress. Chidi NP at bedside. EKG- Afib w/RVR, . Received orders for cardizem 10 mg IVP and gtt. /80    Pt transferred to PCU for titratable cardizem gtt. Agustin Cruz RN  RRT, [id number]    ADDENDUM    1036  Pt converted to NSR HR 90. Cardizem gtt stopped. Pt reports chest discomfort has resolved at this time. Discussed with Chidi LAST. Pt to remain in 1116.  Nursing sup updated on plan of care

## 2020-04-24 NOTE — CDMP QUERY
Good Afternoon, Patient admitted with acute hypoxic respiratory failure, noted to have a nutritional assessment on 4/23/20. If possible, please document in progress notes and d/c summary if you are evaluating and/or treating any of the following: 
 
 
=> Acute Moderate Protein-Calorie Malnutrition 
=> Acute Severe Protein-Calorie Malnutrition 
=> Other Explanation of clinical findings 
=> Clinically Undetermined (no explanation for clinical findings) The medical record reflects the following: 
   Risk Factors: 64 yr. Old male admitted with acute hypoxic respiratory failure and widespread metastatic disease. Clinical Indicators: RD notes \"Pt reports poor PO intake x 4 months due to lack of appetite. He initially experience nausea and vomiting but that had resolved. Pt reports weighing ~180 lbs ~4 months ago. Weight history shows he weighed 175 lbs in Feb, showing a 38 lbs (21.7%) weight loss x 2 months. He has mild/moderate temporal and clavicle muscle wasting. Pt meets ASPEN criteria for Acute Severe Malnutrition\". Patient meets criteria for as evidenced by:  
ASPEN Malnutrition Criteria Acute Illness, Chronic Illness, or Social/Enviornmental: Acute illness Energy Intake: Less than/equal to 50% est energy req for greater than/equal to 5 days Weight Loss: Greater than 7.5% x 3 mos Muscle Mass Loss: Moderate ASPEN Malnutrition Score - Acute Illness: 18 
Acute Illness - Malnutrition Diagnosis: Severe malnutrition. \"  
  
   Treatment: Nutritional assessment and education, Commercial supplement Thank You, Ezequiel Lala RN, Clinical  
[phone] Please clarify and document your clinical opinion in the progress notes and discharge summary including the definitive and/or presumptive diagnosis, (suspected or probable), related to the above clinical findings. Please include clinical findings supporting your diagnosis.

## 2020-08-04 NOTE — PROGRESS NOTES
UAI has been completed and successfully processed and signed by MD on 4/24/2020. UAI has been faxed to [address] [place] [address] and Rehab. Sent to medical records to be scanned into EMR.     QUINTIN Awan  Care Manager  [phone]
